# Patient Record
Sex: FEMALE | Race: WHITE | Employment: PART TIME
[De-identification: names, ages, dates, MRNs, and addresses within clinical notes are randomized per-mention and may not be internally consistent; named-entity substitution may affect disease eponyms.]

---

## 2022-03-18 PROBLEM — E78.5 DYSLIPIDEMIA: Status: ACTIVE | Noted: 2021-07-01

## 2022-03-18 PROBLEM — R63.5 WEIGHT GAIN: Status: ACTIVE | Noted: 2021-07-01

## 2022-03-18 PROBLEM — R79.89 ELEVATED LIVER FUNCTION TESTS: Status: ACTIVE | Noted: 2021-07-01

## 2022-03-19 PROBLEM — N28.9 RENAL INSUFFICIENCY: Status: ACTIVE | Noted: 2021-07-01

## 2022-07-27 ENCOUNTER — OFFICE VISIT (OUTPATIENT)
Dept: FAMILY MEDICINE CLINIC | Facility: CLINIC | Age: 67
End: 2022-07-27
Payer: MEDICARE

## 2022-07-27 VITALS
HEIGHT: 63 IN | BODY MASS INDEX: 31.54 KG/M2 | WEIGHT: 178 LBS | SYSTOLIC BLOOD PRESSURE: 158 MMHG | TEMPERATURE: 96.7 F | HEART RATE: 69 BPM | DIASTOLIC BLOOD PRESSURE: 98 MMHG | RESPIRATION RATE: 16 BRPM | OXYGEN SATURATION: 99 %

## 2022-07-27 DIAGNOSIS — Z23 NEED FOR SHINGLES VACCINE: ICD-10-CM

## 2022-07-27 DIAGNOSIS — Z12.11 SCREEN FOR COLON CANCER: ICD-10-CM

## 2022-07-27 DIAGNOSIS — E78.5 DYSLIPIDEMIA: ICD-10-CM

## 2022-07-27 DIAGNOSIS — Z23 NEED FOR PNEUMOCOCCAL VACCINATION: ICD-10-CM

## 2022-07-27 DIAGNOSIS — R63.4 WEIGHT LOSS: ICD-10-CM

## 2022-07-27 DIAGNOSIS — Z12.31 ENCOUNTER FOR SCREENING MAMMOGRAM FOR BREAST CANCER: ICD-10-CM

## 2022-07-27 DIAGNOSIS — Z78.0 ASYMPTOMATIC MENOPAUSE: ICD-10-CM

## 2022-07-27 DIAGNOSIS — Z00.00 MEDICARE ANNUAL WELLNESS VISIT, SUBSEQUENT: Primary | ICD-10-CM

## 2022-07-27 LAB
ALBUMIN SERPL-MCNC: 4.1 G/DL (ref 3.2–4.6)
ALBUMIN/GLOB SERPL: 1.4 {RATIO} (ref 1.2–3.5)
ALP SERPL-CCNC: 73 U/L (ref 50–136)
ALT SERPL-CCNC: 36 U/L (ref 12–65)
ANION GAP SERPL CALC-SCNC: 6 MMOL/L (ref 7–16)
AST SERPL-CCNC: 21 U/L (ref 15–37)
BASOPHILS # BLD: 0 K/UL (ref 0–0.2)
BASOPHILS NFR BLD: 1 % (ref 0–2)
BILIRUB SERPL-MCNC: 0.6 MG/DL (ref 0.2–1.1)
BUN SERPL-MCNC: 13 MG/DL (ref 8–23)
CALCIUM SERPL-MCNC: 9.3 MG/DL (ref 8.3–10.4)
CHLORIDE SERPL-SCNC: 109 MMOL/L (ref 98–107)
CHOLEST SERPL-MCNC: 256 MG/DL
CO2 SERPL-SCNC: 27 MMOL/L (ref 21–32)
CREAT SERPL-MCNC: 1 MG/DL (ref 0.6–1)
DIFFERENTIAL METHOD BLD: ABNORMAL
EOSINOPHIL # BLD: 0.1 K/UL (ref 0–0.8)
EOSINOPHIL NFR BLD: 1 % (ref 0.5–7.8)
ERYTHROCYTE [DISTWIDTH] IN BLOOD BY AUTOMATED COUNT: 13.3 % (ref 11.9–14.6)
GLOBULIN SER CALC-MCNC: 3 G/DL (ref 2.3–3.5)
GLUCOSE SERPL-MCNC: 87 MG/DL (ref 65–100)
HCT VFR BLD AUTO: 48 % (ref 35.8–46.3)
HDLC SERPL-MCNC: 42 MG/DL (ref 40–60)
HDLC SERPL: 6.1 {RATIO}
HGB BLD-MCNC: 15.3 G/DL (ref 11.7–15.4)
IMM GRANULOCYTES # BLD AUTO: 0 K/UL (ref 0–0.5)
IMM GRANULOCYTES NFR BLD AUTO: 0 % (ref 0–5)
LDLC SERPL CALC-MCNC: 181.8 MG/DL
LYMPHOCYTES # BLD: 2.3 K/UL (ref 0.5–4.6)
LYMPHOCYTES NFR BLD: 42 % (ref 13–44)
MCH RBC QN AUTO: 28.2 PG (ref 26.1–32.9)
MCHC RBC AUTO-ENTMCNC: 31.9 G/DL (ref 31.4–35)
MCV RBC AUTO: 88.6 FL (ref 79.6–97.8)
MONOCYTES # BLD: 0.5 K/UL (ref 0.1–1.3)
MONOCYTES NFR BLD: 9 % (ref 4–12)
NEUTS SEG # BLD: 2.6 K/UL (ref 1.7–8.2)
NEUTS SEG NFR BLD: 47 % (ref 43–78)
NRBC # BLD: 0 K/UL (ref 0–0.2)
PLATELET # BLD AUTO: 281 K/UL (ref 150–450)
PMV BLD AUTO: 9.8 FL (ref 9.4–12.3)
POTASSIUM SERPL-SCNC: 4.6 MMOL/L (ref 3.5–5.1)
PROT SERPL-MCNC: 7.1 G/DL (ref 6.3–8.2)
RBC # BLD AUTO: 5.42 M/UL (ref 4.05–5.2)
SODIUM SERPL-SCNC: 142 MMOL/L (ref 136–145)
TRIGL SERPL-MCNC: 161 MG/DL (ref 35–150)
TSH, 3RD GENERATION: 1.42 UIU/ML (ref 0.36–3.74)
VLDLC SERPL CALC-MCNC: 32.2 MG/DL (ref 6–23)
WBC # BLD AUTO: 5.6 K/UL (ref 4.3–11.1)

## 2022-07-27 PROCEDURE — 1123F ACP DISCUSS/DSCN MKR DOCD: CPT | Performed by: FAMILY MEDICINE

## 2022-07-27 PROCEDURE — 90732 PPSV23 VACC 2 YRS+ SUBQ/IM: CPT | Performed by: FAMILY MEDICINE

## 2022-07-27 PROCEDURE — G0439 PPPS, SUBSEQ VISIT: HCPCS | Performed by: FAMILY MEDICINE

## 2022-07-27 PROCEDURE — 3017F COLORECTAL CA SCREEN DOC REV: CPT | Performed by: FAMILY MEDICINE

## 2022-07-27 PROCEDURE — G0009 ADMIN PNEUMOCOCCAL VACCINE: HCPCS | Performed by: FAMILY MEDICINE

## 2022-07-27 RX ORDER — ZOSTER VACCINE RECOMBINANT, ADJUVANTED 50 MCG/0.5
0.5 KIT INTRAMUSCULAR SEE ADMIN INSTRUCTIONS
Qty: 0.5 ML | Refills: 0 | Status: SHIPPED | OUTPATIENT
Start: 2022-07-27 | End: 2023-01-23

## 2022-07-27 ASSESSMENT — PATIENT HEALTH QUESTIONNAIRE - PHQ9
SUM OF ALL RESPONSES TO PHQ QUESTIONS 1-9: 0
SUM OF ALL RESPONSES TO PHQ9 QUESTIONS 1 & 2: 0
SUM OF ALL RESPONSES TO PHQ QUESTIONS 1-9: 0
SUM OF ALL RESPONSES TO PHQ QUESTIONS 1-9: 0
1. LITTLE INTEREST OR PLEASURE IN DOING THINGS: 0
SUM OF ALL RESPONSES TO PHQ QUESTIONS 1-9: 0
2. FEELING DOWN, DEPRESSED OR HOPELESS: 0

## 2022-07-27 ASSESSMENT — LIFESTYLE VARIABLES
HOW OFTEN DO YOU HAVE A DRINK CONTAINING ALCOHOL: MONTHLY OR LESS
HOW MANY STANDARD DRINKS CONTAINING ALCOHOL DO YOU HAVE ON A TYPICAL DAY: 1 OR 2

## 2022-07-27 NOTE — PROGRESS NOTES
HISTORY OF PRESENT ILLNESS  Chief Complaint   Patient presents with    Medicare AW         Hyperlipidemia  Patient presents for follow up evaluation of hyperlipidemia. Diet and Lifestyle was discussed. Pertinent ROS: taking medications as instructed, no medication side effects noted, no TIA's, no chest pain on exertion, no dyspnea on exertion, no swelling of ankles. Risk factors for vascular disease consist of hyperlipidemia. Key CAD CHF Meds       Patient is on no cardiovascular meds. Lab Results   Component Value Date    CHOL 256 (H) 07/27/2022    CHOL 230 (H) 07/01/2021    CHOL 243 (H) 08/14/2019     Lab Results   Component Value Date    TRIG 161 (H) 07/27/2022    TRIG 95 07/01/2021    TRIG 125 08/14/2019     Lab Results   Component Value Date    HDL 42 07/27/2022    HDL 48 07/01/2021    HDL 50 08/14/2019     Lab Results   Component Value Date    LDLCALC 181.8 (H) 07/27/2022    LDLCALC 165 (H) 07/01/2021    LDLCALC 168 (H) 08/14/2019     Lab Results   Component Value Date    LABVLDL 32.2 (H) 07/27/2022    LABVLDL 25 08/14/2019    VLDL 17 07/01/2021     Lab Results   Component Value Date    CHOLHDLRATIO 6.1 07/27/2022      Worse with high fat or high sugar diet. Improves with exercise and healthy diet . BP Readings from Last 3 Encounters:   07/27/22 (!) 158/98   07/01/21 137/87      Wt Readings from Last 3 Encounters:   07/27/22 178 lb (80.7 kg)   07/01/21 182 lb (82.6 kg)      Working 3 days a week. Walks the dog in the mornings. HPI  Cholesterol Problem  The history is provided by the patient. This is a chronic problem. The current episode started more than 1 week ago. The problem occurs daily. The problem has not changed since onset. Pertinent negatives include no chest pain, no abdominal pain, no headaches and no shortness of breath. The symptoms are aggravated by eating. The symptoms are relieved by walking. The treatment provided no relief.     Review of Systems   Constitutional: Negative for activity change, appetite change, chills, fatigue and fever. HENT:  Negative for congestion and rhinorrhea. Respiratory:  Negative for cough, shortness of breath and wheezing. Gastrointestinal:  Negative for abdominal pain, constipation, diarrhea, nausea and vomiting. Genitourinary:  Negative for dysuria. Musculoskeletal:  Negative for arthralgias and myalgias. Neurological:  Negative for dizziness and headaches. Psychiatric/Behavioral:  Negative for dysphoric mood. The patient is not nervous/anxious. Patient Active Problem List   Diagnosis    Elevated liver function tests    Dyslipidemia    Weight gain    Renal insufficiency         Blood pressure (!) 158/98, pulse 69, temperature (!) 96.7 °F (35.9 °C), temperature source Temporal, resp. rate 16, height 5' 2.75\" (1.594 m), weight 178 lb (80.7 kg), SpO2 99 %. Pain Scale: 0 - No pain/10 Pain Location:   Body mass index is 31.78 kg/m². Physical Exam  Vitals and nursing note reviewed. Constitutional:       General: She is not in acute distress. Appearance: Normal appearance. She is normal weight. She is not toxic-appearing. HENT:      Head: Normocephalic and atraumatic. Eyes:      General: Lids are normal.      Extraocular Movements: Extraocular movements intact. Conjunctiva/sclera: Conjunctivae normal.      Pupils: Pupils are equal.   Cardiovascular:      Rate and Rhythm: Normal rate and regular rhythm. Heart sounds: Normal heart sounds. No murmur heard. No friction rub. No gallop. Pulmonary:      Effort: Pulmonary effort is normal. No respiratory distress. Breath sounds: Normal breath sounds. No wheezing or rales. Musculoskeletal:      Right lower leg: No edema. Left lower leg: No edema. Skin:     General: Skin is warm and dry. Neurological:      General: No focal deficit present. Mental Status: She is alert.    Psychiatric:         Mood and Affect: Mood normal.         Behavior: Behavior normal.         Thought Content: Thought content normal.         Judgment: Judgment normal.            ASSESSMENT and PLAN   Diagnosis Orders   1. Medicare annual wellness visit, subsequent        2. Encounter for screening mammogram for breast cancer  ZIA ROSSY DIGITAL SCREEN BILATERAL      3. Need for shingles vaccine  zoster recombinant adjuvanted vaccine [facility]) 50 MCG/0.5ML SUSR injection      4. Dyslipidemia  Lipid Panel    Lipid Panel      5. Need for pneumococcal vaccination  Pneumococcal, PPSV23, PNEUMOVAX 21, (age 2 yrs+), SC/IM      6. Asymptomatic menopause  DEXA BONE DENSITY AXIAL SKELETON      7. Screen for colon cancer  Cologuard (Fecal DNA Colorectal Cancer Screening)      8. Weight loss  TSH    Comprehensive Metabolic Panel    CBC with Auto Differential    CBC with Auto Differential    Comprehensive Metabolic Panel    TSH          Reviewed medications and side effects in detail          Her other chronic conditions are stable at this time. She is stable on the current treatment and tolerating it well. No significant sides effects. Will not adjust therapy at this time, unless noted above. We will continue to monitor for any problems. Medications refilled and lab work has been ordered where needed. Reviewed medications are explained including any potential interactions or side effects in detail. The patient's questions were answered. She  understands the above and has no further questions. Further workup and treatment should be done if symptoms persist, worsen or new symptoms occur. She  will call to notify us of any problems, complications or worsening symptoms. Follow-up and Dispositions    Return in about 1 year (around 7/27/2023) for labs today. There are no Patient Instructions on file for this visit. (Some details in this note may have been created with speech-recognition software. Some errors in speech recognition may have occurred.    Most of those have been corrected but some may have been missed.)         Miguel Valdez MD  [facility]  [address] [place] [address]  Phone [phone]      Medicare Annual Wellness Visit    Miguel Barney is here for Medicare AWV    Assessment & Plan   Medicare annual wellness visit, subsequent  Encounter for screening mammogram for breast cancer  -     ZIA ROSSY DIGITAL SCREEN BILATERAL; Future  Need for shingles vaccine  -     zoster recombinant adjuvanted vaccine [facility]) 50 MCG/0.5ML SUSR injection; Inject 0.5 mLs into the muscle See Admin Instructions 1 dose now and repeat in 2-6 months, Disp-0.5 mL, R-0Normal  Dyslipidemia  -     Lipid Panel; Future  Need for pneumococcal vaccination  -     Pneumococcal, PPSV23, PNEUMOVAX 21, (age 2 yrs+), SC/IM  Asymptomatic menopause  -     DEXA BONE DENSITY AXIAL SKELETON; Future  Screen for colon cancer  -     Cologuard (Fecal DNA Colorectal Cancer Screening)  Weight loss  -     TSH; Future  -     Comprehensive Metabolic Panel; Future  -     CBC with Auto Differential; Future    Recommendations for Preventive Services Due: see orders and patient instructions/AVS.  Recommended screening schedule for the next 5-10 years is provided to the patient in written form: see Patient Instructions/AVS.     Return for Medicare Annual Wellness Visit in 1 year. Subjective       Patient's complete Health Risk Assessment and screening values have been reviewed and are found in Flowsheets. The following problems were reviewed today and where indicated follow up appointments were made and/or referrals ordered. Positive Risk Factor Screenings with Interventions:             Opioid Risk: (Low risk score <55) Opioid risk score: 2    Patient is low risk for opioid use disorder or overdose.   Last PDMP Nano Sim as Reviewed:  Review User Review Instant Review Result             General Health and ACP:  General  In general, how would you say your health is?: Very Good  In the past 7 days, have you experienced any of the following: New or Increased Pain, New or Increased Fatigue, Loneliness, Social Isolation, Stress or Anger?: (!) Yes  Select all that apply: (!) Stress  Do you get the social and emotional support that you need?: Yes  Do you have a Living Will?: (!) No    Advance Directives       Power of  Living Will ACP-Advance Directive ACP-Power of     Not on File Not on File Not on File Not on File          General Health Risk Interventions:  No additional needs    Health Habits/Nutrition:  Physical Activity: Insufficiently Active    Days of Exercise per Week: 3 days    Minutes of Exercise per Session: 30 min     Have you lost any weight without trying in the past 3 months?: No  Body mass index: (!) 31.78  Have you seen the dentist within the past year?: Yes  Health Habits/Nutrition Interventions:  No additional needs    Hearing/Vision:  Do you or your family notice any trouble with your hearing that hasn't been managed with hearing aids?: No  Do you have difficulty driving, watching TV, or doing any of your daily activities because of your eyesight?: No  Have you had an eye exam within the past year?: (!) No  No results found.   Hearing/Vision Interventions:  No additional needs    Safety:  Do you have working smoke detectors?: Yes  Do you have any tripping hazards - loose or unsecured carpets or rugs?: No  Do you have any tripping hazards - clutter in doorways, halls, or stairs?: No  Do you have either shower bars, grab bars, non-slip mats or non-slip surfaces in your shower or bathtub?: (!) No  Do all of your stairways have a railing or banister?: Yes  Do you always fasten your seatbelt when you are in a car?: Yes  Safety Interventions:  Patient declines any further evaluation/treatment for this issue           Objective   Vitals:    07/27/22 1148   BP: (!) 158/98   Site: Left Upper Arm   Position: Sitting   Cuff Size: Large Adult   Pulse: 69   Resp: 16   Temp: Gasper Contrerassvetlana ) 96.7 °F (35.9 °C)   TempSrc: Temporal   SpO2: 99%   Weight: 178 lb (80.7 kg)   Height: 5' 2.75\" (1.594 m)      Body mass index is 31.78 kg/m². No Known Allergies  Prior to Visit Medications    Medication Sig Taking? Authorizing Provider   zoster recombinant adjuvanted vaccine [facility]) 50 MCG/0.5ML SUSR injection Inject 0.5 mLs into the muscle See Admin Instructions 1 dose now and repeat in 2-6 months Yes Keyur Guerin MD   Opioid Use Statement for Medicare  Her medication list was reviewed and reconciliation performed. She does not use opioid medication inappropriately.      CareTeam (Including outside providers/suppliers regularly involved in providing care):   Patient Care Team:  Keyur Guerin MD as PCP - Thais Moreno MD as PCP - [facility] Empaneled Provider     Reviewed and updated this visit:  Tobacco  Allergies  Meds  Problems  Med Hx  Surg Hx  Soc Hx  Fam Hx

## 2022-07-28 NOTE — RESULT ENCOUNTER NOTE
Your cholesterol levels are elevated. Make sure you are eating a heart healthy nutritious diet, getting regular aerobic physical activity, maintaining desired appropriate body weight and avoiding tobacco products. Recheck lab work with an appointment in 3-6 months. You would benefit from a cholesterol medication. That would help prevent heart attack and stroke. I can send that to your pharmacy if interested.   Other labs are normal.

## 2022-08-02 ASSESSMENT — ENCOUNTER SYMPTOMS
CONSTIPATION: 0
RHINORRHEA: 0
SHORTNESS OF BREATH: 0
WHEEZING: 0
DIARRHEA: 0
ABDOMINAL PAIN: 0
NAUSEA: 0
VOMITING: 0
COUGH: 0

## 2022-08-03 NOTE — PATIENT INSTRUCTIONS
Personalized Preventive Plan for Tez Domínguez - 7/27/2022  Medicare offers a range of preventive health benefits. Some of the tests and screenings are paid in full while other may be subject to a deductible, co-insurance, and/or copay. Some of these benefits include a comprehensive review of your medical history including lifestyle, illnesses that may run in your family, and various assessments and screenings as appropriate. After reviewing your medical record and screening and assessments performed today your provider may have ordered immunizations, labs, imaging, and/or referrals for you. A list of these orders (if applicable) as well as your Preventive Care list are included within your After Visit Summary for your review. Other Preventive Recommendations:    A preventive eye exam performed by an eye specialist is recommended every 1-2 years to screen for glaucoma; cataracts, macular degeneration, and other eye disorders. A preventive dental visit is recommended every 6 months. Try to get at least 150 minutes of exercise per week or 10,000 steps per day on a pedometer . Order or download the FREE \"Exercise & Physical Activity: Your Everyday Guide\" from [employer]. Call 8-536.585.5069 or search [employer] online. You need 7869-2693 mg of calcium and 6825-5425 IU of vitamin D per day. It is possible to meet your calcium requirement with diet alone, but a vitamin D supplement is usually necessary to meet this goal.  When exposed to the sun, use a sunscreen that protects against both UVA and UVB radiation with an SPF of 30 or greater. Reapply every 2 to 3 hours or after sweating, drying off with a towel, or swimming. Always wear a seat belt when traveling in a car. Always wear a helmet when riding a bicycle or motorcycle.

## 2022-08-11 ENCOUNTER — TELEPHONE (OUTPATIENT)
Dept: FAMILY MEDICINE CLINIC | Facility: CLINIC | Age: 67
End: 2022-08-11

## 2022-08-11 ENCOUNTER — HOSPITAL ENCOUNTER (OUTPATIENT)
Dept: MAMMOGRAPHY | Age: 67
Discharge: HOME | End: 2022-08-14
Payer: MEDICARE

## 2022-08-11 DIAGNOSIS — Z78.0 ASYMPTOMATIC MENOPAUSE: ICD-10-CM

## 2022-08-11 DIAGNOSIS — E78.5 DYSLIPIDEMIA: Primary | ICD-10-CM

## 2022-08-11 PROCEDURE — 77080 DXA BONE DENSITY AXIAL: CPT

## 2022-08-11 RX ORDER — ROSUVASTATIN CALCIUM 20 MG/1
20 TABLET, COATED ORAL NIGHTLY
Qty: 90 TABLET | Refills: 3 | Status: SHIPPED | OUTPATIENT
Start: 2022-08-11

## 2022-08-11 NOTE — TELEPHONE ENCOUNTER
----- Message from HARMONY Heredia 98. KACI Ward sent at 8/11/2022 12:33 PM EDT -----  Notified pt. States that she will try the medication.  Please send to [facility]

## 2022-08-11 NOTE — TELEPHONE ENCOUNTER
Prescription(s) refilled  It (they) has been escribed to the pharmacy. Requested Prescriptions     Signed Prescriptions Disp Refills    rosuvastatin (CRESTOR) 20 MG tablet 90 tablet 3     Sig: Take 1 tablet by mouth nightly     Authorizing Provider: VEL ACOSTA     No orders of the defined types were placed in this encounter. ICD-10-CM    1.  Dyslipidemia  E78.5 rosuvastatin (CRESTOR) 20 MG tablet

## 2022-08-14 NOTE — RESULT ENCOUNTER NOTE
Your bone density test was normal .  Make sure you are getting plenty of weight bearing exercise. Make sure you are getting enough calcium IN YOUR DIET . You may want to consider adding vit D3 2000 iu each day WITH A MEAL THAT CONTAINS FAT. Vitamin D is a fat soluble vitamin.

## 2022-09-09 ENCOUNTER — HOSPITAL ENCOUNTER (OUTPATIENT)
Dept: MAMMOGRAPHY | Age: 67
Discharge: HOME | End: 2022-09-12
Payer: MEDICARE

## 2022-09-09 DIAGNOSIS — Z12.31 ENCOUNTER FOR SCREENING MAMMOGRAM FOR BREAST CANCER: ICD-10-CM

## 2022-09-09 PROCEDURE — 77063 BREAST TOMOSYNTHESIS BI: CPT

## 2022-09-27 ENCOUNTER — APPOINTMENT (OUTPATIENT)
Dept: MAMMOGRAPHY | Age: 67
End: 2022-09-27
Payer: MEDICARE

## 2022-09-27 ENCOUNTER — HOSPITAL ENCOUNTER (OUTPATIENT)
Dept: MAMMOGRAPHY | Age: 67
Discharge: HOME | End: 2022-09-30
Payer: MEDICARE

## 2022-09-27 DIAGNOSIS — R92.8 ABNORMAL SCREENING MAMMOGRAM: ICD-10-CM

## 2022-09-27 PROCEDURE — 77065 DX MAMMO INCL CAD UNI: CPT

## 2022-09-27 PROCEDURE — 76642 ULTRASOUND BREAST LIMITED: CPT

## 2022-10-05 ENCOUNTER — HOSPITAL ENCOUNTER (OUTPATIENT)
Dept: MAMMOGRAPHY | Age: 67
Discharge: HOME | End: 2022-10-08
Payer: MEDICARE

## 2022-10-05 ENCOUNTER — APPOINTMENT (OUTPATIENT)
Dept: MAMMOGRAPHY | Age: 67
End: 2022-10-05
Payer: MEDICARE

## 2022-10-05 VITALS — SYSTOLIC BLOOD PRESSURE: 167 MMHG | HEART RATE: 91 BPM | DIASTOLIC BLOOD PRESSURE: 91 MMHG

## 2022-10-05 DIAGNOSIS — R92.8 ABNORMAL MAMMOGRAM OF RIGHT BREAST: ICD-10-CM

## 2022-10-05 DIAGNOSIS — N63.10 MASS OF RIGHT BREAST, UNSPECIFIED QUADRANT: ICD-10-CM

## 2022-10-05 PROCEDURE — 77065 DX MAMMO INCL CAD UNI: CPT

## 2022-10-05 PROCEDURE — 88361 TUMOR IMMUNOHISTOCHEM/COMPUT: CPT

## 2022-10-05 PROCEDURE — 88305 TISSUE EXAM BY PATHOLOGIST: CPT

## 2022-10-05 PROCEDURE — 2709999900 US BREAST BIOPSY W LOC DEVICE 1ST LESION RIGHT

## 2022-10-05 PROCEDURE — 2500000003 HC RX 250 WO HCPCS: Performed by: FAMILY MEDICINE

## 2022-10-05 RX ORDER — LIDOCAINE HYDROCHLORIDE 10 MG/ML
5 INJECTION, SOLUTION INFILTRATION; PERINEURAL ONCE
Status: COMPLETED | OUTPATIENT
Start: 2022-10-05 | End: 2022-10-05

## 2022-10-05 RX ADMIN — LIDOCAINE HYDROCHLORIDE 5 ML: 10 INJECTION, SOLUTION INFILTRATION; PERINEURAL at 09:13

## 2022-10-05 ASSESSMENT — PAIN DESCRIPTION - INTENSITY
RATING_2: 0
RATING_3: 1

## 2022-10-05 ASSESSMENT — PAIN SCALES - GENERAL: PAINLEVEL_OUTOF10: 0

## 2022-10-10 ENCOUNTER — CLINICAL DOCUMENTATION (OUTPATIENT)
Dept: MAMMOGRAPHY | Age: 67
End: 2022-10-10

## 2022-10-10 NOTE — PROGRESS NOTES
Dr Reagan Montesinos and I spoke with patient regarding the results of her breast biopsy, she was here alone. She had no post biopsy issues or concerns. She is scheduled for an MRI 10-19-22 @ 3:30, she was unable to come any sooner due to work schedule. I gave her an information packet and let her know she will be contacted by the cancer center with her next steps.

## 2022-10-11 ENCOUNTER — TELEPHONE (OUTPATIENT)
Dept: CASE MANAGEMENT | Age: 67
End: 2022-10-11

## 2022-10-11 SDOH — SOCIAL STABILITY: SOCIAL INSECURITY
WITHIN THE LAST YEAR, HAVE TO BEEN RAPED OR FORCED TO HAVE ANY KIND OF SEXUAL ACTIVITY BY YOUR PARTNER OR EX-PARTNER?: NO

## 2022-10-11 SDOH — HEALTH STABILITY: MENTAL HEALTH
STRESS IS WHEN SOMEONE FEELS TENSE, NERVOUS, ANXIOUS, OR CAN'T SLEEP AT NIGHT BECAUSE THEIR MIND IS TROUBLED. HOW STRESSED ARE YOU?: NOT AT ALL

## 2022-10-11 SDOH — ECONOMIC STABILITY: TRANSPORTATION INSECURITY
IN THE PAST 12 MONTHS, HAS THE LACK OF TRANSPORTATION KEPT YOU FROM MEDICAL APPOINTMENTS OR FROM GETTING MEDICATIONS?: NO

## 2022-10-11 SDOH — ECONOMIC STABILITY: FOOD INSECURITY: WITHIN THE PAST 12 MONTHS, THE FOOD YOU BOUGHT JUST DIDN'T LAST AND YOU DIDN'T HAVE MONEY TO GET MORE.: NEVER TRUE

## 2022-10-11 SDOH — SOCIAL STABILITY: SOCIAL NETWORK: HOW OFTEN DO YOU GET TOGETHER WITH FRIENDS OR RELATIVES?: MORE THAN THREE TIMES A WEEK

## 2022-10-11 SDOH — HEALTH STABILITY: PHYSICAL HEALTH: ON AVERAGE, HOW MANY MINUTES DO YOU ENGAGE IN EXERCISE AT THIS LEVEL?: 0 MIN

## 2022-10-11 SDOH — SOCIAL STABILITY: SOCIAL NETWORK
IN A TYPICAL WEEK, HOW MANY TIMES DO YOU TALK ON THE PHONE WITH FAMILY, FRIENDS, OR NEIGHBORS?: MORE THAN THREE TIMES A WEEK

## 2022-10-11 SDOH — ECONOMIC STABILITY: INCOME INSECURITY: HOW HARD IS IT FOR YOU TO PAY FOR THE VERY BASICS LIKE FOOD, HOUSING, MEDICAL CARE, AND HEATING?: NOT VERY HARD

## 2022-10-11 SDOH — HEALTH STABILITY: PHYSICAL HEALTH: ON AVERAGE, HOW MANY DAYS PER WEEK DO YOU ENGAGE IN MODERATE TO STRENUOUS EXERCISE (LIKE A BRISK WALK)?: 0 DAYS

## 2022-10-11 SDOH — SOCIAL STABILITY: SOCIAL INSECURITY: WITHIN THE LAST YEAR, HAVE YOU BEEN HUMILIATED OR EMOTIONALLY ABUSED IN OTHER WAYS BY YOUR PARTNER OR EX-PARTNER?: NO

## 2022-10-11 SDOH — ECONOMIC STABILITY: INCOME INSECURITY: IN THE LAST 12 MONTHS, WAS THERE A TIME WHEN YOU WERE NOT ABLE TO PAY THE MORTGAGE OR RENT ON TIME?: NO

## 2022-10-11 SDOH — SOCIAL STABILITY: SOCIAL INSECURITY
WITHIN THE LAST YEAR, HAVE YOU BEEN KICKED, HIT, SLAPPED, OR OTHERWISE PHYSICALLY HURT BY YOUR PARTNER OR EX-PARTNER?: NO

## 2022-10-11 SDOH — ECONOMIC STABILITY: TRANSPORTATION INSECURITY
IN THE PAST 12 MONTHS, HAS LACK OF TRANSPORTATION KEPT YOU FROM MEETINGS, WORK, OR FROM GETTING THINGS NEEDED FOR DAILY LIVING?: NO

## 2022-10-11 SDOH — SOCIAL STABILITY: SOCIAL INSECURITY: WITHIN THE LAST YEAR, HAVE YOU BEEN AFRAID OF YOUR PARTNER OR EX-PARTNER?: NO

## 2022-10-11 SDOH — ECONOMIC STABILITY: FOOD INSECURITY: WITHIN THE PAST 12 MONTHS, YOU WORRIED THAT YOUR FOOD WOULD RUN OUT BEFORE YOU GOT MONEY TO BUY MORE.: NEVER TRUE

## 2022-10-11 SDOH — ECONOMIC STABILITY: HOUSING INSECURITY
IN THE LAST 12 MONTHS, WAS THERE A TIME WHEN YOU DID NOT HAVE A STEADY PLACE TO SLEEP OR SLEPT IN A SHELTER (INCLUDING NOW)?: NO

## 2022-10-11 SDOH — SOCIAL STABILITY: SOCIAL NETWORK: ARE YOU MARRIED, WIDOWED, DIVORCED, SEPARATED, NEVER MARRIED, OR LIVING WITH A PARTNER?: DIVORCED

## 2022-10-11 ASSESSMENT — PATIENT HEALTH QUESTIONNAIRE - PHQ9
SUM OF ALL RESPONSES TO PHQ9 QUESTIONS 1 & 2: 0
2. FEELING DOWN, DEPRESSED OR HOPELESS: 0
SUM OF ALL RESPONSES TO PHQ QUESTIONS 1-9: 0
SUM OF ALL RESPONSES TO PHQ QUESTIONS 1-9: 0
1. LITTLE INTEREST OR PLEASURE IN DOING THINGS: 0
SUM OF ALL RESPONSES TO PHQ QUESTIONS 1-9: 0
SUM OF ALL RESPONSES TO PHQ QUESTIONS 1-9: 0

## 2022-10-11 NOTE — TELEPHONE ENCOUNTER
10/11/2022  Breast Navigation  intake complete for New Patient Breast Cancer. Reviewed role of navigation, gave contact information for navigators, discussed pathology report and  pending receptor status, reviewed upcoming appointments. Patient is employed as a part time  teacher. Independent in self care No physical limitations. Barriers:  No  financial, psychosocial,or transportation barriers noted. Lives with her ex  they have reunited. Her daughter Austin Eugene is also local and along with her ex  , both are her primary support system. She prefers communication to her only for the time being for her alecia care discussions. Family history of breast cancer:  No  She had an axillary node biopsy in 1973 this was noted to be cat scratch fever. Her sister had lung cancer. Type of cancer: Right breast IDC. MRI   10-19-22 due to work schedule. Social determinants of health and Depression screen complete. Referring Provider:  Dr. Lobito Blue MD and Navigator to treatment team   Appointment with Oncology: Dr. Jyoti Scott 10-21. Appointment with Surgery: Dr. Chi Kenny 10-24-22. Breast Multidisciplinary Conference presentation date:  pending for 10-27-22. My Chart link send to patient. Routed note to referring provider regarding intake and upcoming appointments.

## 2022-10-19 ENCOUNTER — HOSPITAL ENCOUNTER (OUTPATIENT)
Dept: MRI IMAGING | Age: 67
Discharge: HOME | End: 2022-10-22
Payer: MEDICARE

## 2022-10-19 DIAGNOSIS — C50.919 INFILTRATING DUCT CARCINOMA (HCC): ICD-10-CM

## 2022-10-19 PROCEDURE — C8908 MRI W/O FOL W/CONT, BREAST,: HCPCS

## 2022-10-19 PROCEDURE — A9579 GAD-BASE MR CONTRAST NOS,1ML: HCPCS | Performed by: FAMILY MEDICINE

## 2022-10-19 PROCEDURE — 6360000004 HC RX CONTRAST MEDICATION: Performed by: FAMILY MEDICINE

## 2022-10-19 RX ADMIN — GADOTERIDOL 18 ML: 279.3 INJECTION, SOLUTION INTRAVENOUS at 16:28

## 2022-10-20 NOTE — PROGRESS NOTES
New Patient Abstract    Reason for Referral: Breast Cancer    Referring Provider:  Mayra Rizo MD    Primary Care Provider: Mayra Rizo MD    Family History of Cancer/Hematologic Disorders: Family history is significant for sister with lung cancer. Presenting Symptoms: Abnormal routine screening mammogram     Narrative with recent with Results/Procedures/Biopsies and Dates completed: Ms. Gil Dougherty is a 59-year-old white female with a history of dyslipidemia, elevated LFTs, renal insufficiency, and axillary node biopsy in 1973 that was noted to be cat scratch fever. She initially presented for a routine bilateral screening mammogram on 9/9/22 which identified a right posterocentral outer mid breast mass and associated microcalcifications. Further evaluation with right breast diagnostic mammogram on 9/27/2022 showed mass-like density and associated calcifications persisted in the outer right breast demonstrating suspicious features with spiculated margins and suggested architectural distortion. Right breast ultrasound also performed on 9/27/22 confirmed a hypoechoic shadowing mass at the 9 o'clock position of the right breast measuring 2.7 cm x 3 cm x 1.9 cm demonstrating multiple highly suspicious features such as irregular margins, shadowing, and a vertical configuration for which a benign process could not be confirmed. Recommended ultrasound-guided biopsy of the right breast lesion was performed on 10/5/22 with pathology revealing ER 98%/OH 92% positive, HER-2 (0) negative, high grade (poorly differentiated), infiltrating ductal carcinoma with no definite in situ component or lymphovascular invasion identified. MRI of the bilateral breasts as completed on 10/19/22 demonstrating right 9:00 breast cancer measuring up to 6.4 cm by MRI (recent mammography demonstrated at least 8 cm of expected disease); no suspicious left breast finding; and no obvious lymphadenopathy.     Patient is now referred to [facility] for Medical Oncology evaluation and treatment of newly diagnosed breast cancer. She was also referred to surgery and is scheduled for initial surgical consultation with Surgeon, Dr. Dameon Hill, on 10/24/22. BILATERAL SCREENING DIGITAL MAMMOGRAPHY WITH TOMOSYNTHESIS 9/9/22  FINDINGS:  CC and MLO views of both breasts demonstrate scattered fibroglandular tissue bilaterally. Scattered typically benign bilateral calcifications and small axillary tail lymph nodes are again noted. There is a new irregular mass posterocentrally in the right outer mid breast measuring approximately 3 cm at the 9-10:00 position 11 cm from the nipple. Associated microcalcifications extend anteriorly and posteriorly over total of approximately a centimeter. No other definite evidence for malignancy is seen elsewhere in either breast.   IMPRESSION: RIGHT POSTEROCENTRAL OUTER MID BREAST MASS AND ASSOCIATED MICROCALCIFICATIONS SHOULD BE FURTHER EVALUATED WITH STRAIGHT LATERAL AND MAGNIFICATION SPOT COMPRESSION VIEWS AS WELL AS ULTRASOUND AT THIS TIME. BI-RADS Assessment Category 0: Incomplete, needs additional imaging evaluation. BD2    RIGHT BREAST DIAGNOSTIC MAMMOGRAM and RIGHT BREAST ULTRASOUND, 9/27/2022  FINDINGS:  RIGHT BREAST DIAGNOSTIC MAMMOGRAM: Straight mediolateral view of the right breast as well as compression images of the right breast in the CC, and MLO plane are submitted for evaluation. Masslike  density and associated calcifications persist in the outer right breast. These demonstrate suspicious features with spiculated margins and suggest architectural distortion. Additional associated calcifications also suspicious. Further characterization with focused diagnostic ultrasound is recommended.    RIGHT BREAST ULTRASOUND:   FINDINGS: Focused ultrasound imaging at the 9 o'clock position of the right breast in the area of masslike density as seen on prior mammogram imaging shows a hypoechoic shadowing mass measuring 2.7 cm x 3 cm x 1.9 cm demonstrating multiple highly suspicious features such as irregular margins, shadowing, and a vertical configuration for which a benign process cannot be confirmed. IMPRESSION:  1. Highly suspicious right breast mass. Further evaluation with ultrasound-guided biopsy is recommended. BI-RADS Assessment Category 5: Highly suggestive of malignancy- Appropriate action should be taken. [facility] SURGICAL PATHOLOGY REPORT 10/5/2022  DIAGNOSIS   A: \" RIGHT BREAST, 9:00 POSITION, 12 CM FROM NIPPLE, CORE BIOPSY\": INFILTRATING DUCTAL CARCINOMA, HIGH GRADE (POORLY DIFFERENTIATED). DEFINITE IN SITU COMPONENT AND LYMPHOVASCULAR INVASION ARE NOT IDENTIFIED  [facility]- ER/NM/[id number] BY IHC   INTERPRETATION   [employer] [facility] IHC Quantitative Breast Panel Result: A1   TEST NAME:     RESULTS:   INTERNAL CONTROLS:   ESTROGEN RECEPTOR:   Positive (98%)  Present, positive   PROGESTERONE RECEPTOR:  Positive (92%)  Present, positive   HER-2/SHARDA:     Negative (0)   Percentage of Cells with Uniform   Intense Complete Membrane   Stainin%    MRI BREAST BILATERAL W WO CONTRAST 10/19/22  FINDINGS: The breasts demonstrate moderate glandularity and mild background enhancement. RIGHT BREAST: An 9:00 midposterior depth are biopsy changes within the irregular heterogeneously enhancing malignant mass measuring up to 4.0 x 2.6 x 3.3 cm. Anterior to the mass (within 1.5 cm of the anterior margin) system are 2 tiny 3 mm satellite nodules. Overall the MRI area of concern measures up to 6.4 cm AP. Note that on recent mammography, malignant-type calcifications extending anteriorly and posteriorly from the mass for total AP extent of at least 8 cm. LEFT BREAST: No evidence of suspicious enhancing mass, and no dominant or unique nonmass enhancement, to suggest malignancy. LYMPH NODES: No obvious axillary or internal mammary lymphadenopathy.  Several bilateral axillary lymph nodes are relatively symmetric in size and number, all demonstrating preservation of expected reniform shape with fatty grant. Elsewhere colon limited inclusion of the thorax and upper abdomen shows a partially visualized hiatal hernia which is not entirely seen or evaluated here. IMPRESSION:   1. Right 9:00 breast cancer measures up to 6.4 cm by MRI (recent mammography demonstrated at least 8 cm of expected disease). 2. No suspicious left breast finding. 3. No obvious lymphadenopathy. Recommend continued malignancy management as directed clinically. BI-RADS Assessment Category 6: Known Biopsy Proven Malignancy    Notes from Referring Provider: None    Other Pertinent Information:         Presented at Tumor Board: Patient is scheduled to be presented at tumor board on 10/27/22.

## 2022-10-21 ENCOUNTER — OFFICE VISIT (OUTPATIENT)
Dept: ONCOLOGY | Age: 67
End: 2022-10-21
Payer: MEDICARE

## 2022-10-21 VITALS
OXYGEN SATURATION: 96 % | TEMPERATURE: 97.9 F | SYSTOLIC BLOOD PRESSURE: 153 MMHG | RESPIRATION RATE: 17 BRPM | HEART RATE: 85 BPM | WEIGHT: 179 LBS | HEIGHT: 63 IN | DIASTOLIC BLOOD PRESSURE: 97 MMHG | BODY MASS INDEX: 31.71 KG/M2

## 2022-10-21 DIAGNOSIS — Z17.0 MALIGNANT NEOPLASM OF OVERLAPPING SITES OF RIGHT BREAST IN FEMALE, ESTROGEN RECEPTOR POSITIVE (HCC): Primary | ICD-10-CM

## 2022-10-21 DIAGNOSIS — C50.811 MALIGNANT NEOPLASM OF OVERLAPPING SITES OF RIGHT BREAST IN FEMALE, ESTROGEN RECEPTOR POSITIVE (HCC): Primary | ICD-10-CM

## 2022-10-21 PROCEDURE — 1123F ACP DISCUSS/DSCN MKR DOCD: CPT | Performed by: INTERNAL MEDICINE

## 2022-10-21 PROCEDURE — G8417 CALC BMI ABV UP PARAM F/U: HCPCS | Performed by: INTERNAL MEDICINE

## 2022-10-21 PROCEDURE — 99205 OFFICE O/P NEW HI 60 MIN: CPT | Performed by: INTERNAL MEDICINE

## 2022-10-21 PROCEDURE — 3017F COLORECTAL CA SCREEN DOC REV: CPT | Performed by: INTERNAL MEDICINE

## 2022-10-21 PROCEDURE — G8399 PT W/DXA RESULTS DOCUMENT: HCPCS | Performed by: INTERNAL MEDICINE

## 2022-10-21 PROCEDURE — 1036F TOBACCO NON-USER: CPT | Performed by: INTERNAL MEDICINE

## 2022-10-21 PROCEDURE — G8428 CUR MEDS NOT DOCUMENT: HCPCS | Performed by: INTERNAL MEDICINE

## 2022-10-21 PROCEDURE — G8484 FLU IMMUNIZE NO ADMIN: HCPCS | Performed by: INTERNAL MEDICINE

## 2022-10-21 PROCEDURE — 1090F PRES/ABSN URINE INCON ASSESS: CPT | Performed by: INTERNAL MEDICINE

## 2022-10-21 RX ORDER — AMOXICILLIN 500 MG
CAPSULE ORAL DAILY
COMMUNITY

## 2022-10-21 ASSESSMENT — PATIENT HEALTH QUESTIONNAIRE - PHQ9
1. LITTLE INTEREST OR PLEASURE IN DOING THINGS: 0
SUM OF ALL RESPONSES TO PHQ QUESTIONS 1-9: 0
2. FEELING DOWN, DEPRESSED OR HOPELESS: 0
SUM OF ALL RESPONSES TO PHQ9 QUESTIONS 1 & 2: 0
SUM OF ALL RESPONSES TO PHQ QUESTIONS 1-9: 0

## 2022-10-21 NOTE — PROGRESS NOTES
[facility] Hematology and Oncology: Office Visit New Patient H & P    Chief Complaint:    Chief Complaint   Patient presents with    Follow-up         History of Present Illness:  Ms. Delon Arce is a 75 y.o. female who presents today for evaluation regarding breast cancer. She initially presented for a routine bilateral screening mammogram on 9/9/22 which identified a right posterocentral outer mid breast mass and associated microcalcifications. Further evaluation with right breast diagnostic mammogram on 9/27/2022 showed mass-like density and associated calcifications persisted in the outer right breast demonstrating suspicious features with spiculated margins and suggested architectural distortion. Right breast ultrasound also performed on 9/27/22 confirmed a hypoechoic shadowing mass at the 9 o'clock position of the right breast measuring 2.7 cm x 3 cm x 1.9 cm demonstrating multiple highly suspicious features. Recommended ultrasound-guided biopsy of the right breast lesion was performed on 10/5/22 with pathology revealing ER 98%/KY 92% positive, HER-2 (0) negative, high grade infiltrating ductal carcinoma. MRI of the bilateral breasts as completed on 10/19/22 demonstrating right 9:00 breast cancer measuring up to 6.4 cm by MRI , with no suspicious left breast finding and no obvious lymphadenopathy. She is now referred to [facility] for Medical Oncology evaluation and treatment of newly diagnosed breast cancer. She was also referred to surgery and is scheduled for initial surgical consultation with Surgeon, Dr. Leah Nageotte, on 10/24/22. She is doing well, no complaints today. Review of Systems:  Constitutional: Negative. HENT: Negative. Eyes: Negative. Respiratory: Negative. Cardiovascular: Negative. Gastrointestinal: Negative. Genitourinary: Negative. Musculoskeletal: Negative. Skin: Negative. Neurological: Negative. Endo/Heme/Allergies: Negative. Psychiatric/Behavioral: Negative. All other systems reviewed and are negative. No Known Allergies  No past medical history on file. Past Surgical History:   Procedure Laterality Date    OTHER SURGICAL HISTORY      right lymph node excision    US BREAST NEEDLE BIOPSY RIGHT Right 10/5/2022    US BREAST NEEDLE BIOPSY RIGHT 10/5/2022 Lurlene Ormond, MD [facility] RADIOLOGY MAMMO     Family History   Problem Relation Age of Onset    Thyroid Disease Mother     Heart Disease Mother     High Blood Pressure Father     Heart Disease Father     Thyroid Disease Father     Cancer Sister 48        lung    Diabetes Neg Hx      Social History     Socioeconomic History    Marital status:      Spouse name: Not on file    Number of children: Not on file    Years of education: Not on file    Highest education level: Not on file   Occupational History    Not on file   Tobacco Use    Smoking status: Never    Smokeless tobacco: Never   Vaping Use    Vaping Use: Never used   Substance and Sexual Activity    Alcohol use: Yes    Drug use: Never    Sexual activity: Not on file   Other Topics Concern    Not on file   Social History Narrative    Not on file     Social Determinants of Health     Financial Resource Strain: Low Risk     Difficulty of Paying Living Expenses: Not very hard   Food Insecurity: No Food Insecurity    Worried About Running Out of Food in the Last Year: Never true    Ran Out of Food in the Last Year: Never true   Transportation Needs: No Transportation Needs    Lack of Transportation (Medical): No    Lack of Transportation (Non-Medical):  No   Physical Activity: Inactive    Days of Exercise per Week: 0 days    Minutes of Exercise per Session: 0 min   Stress: No Stress Concern Present    Feeling of Stress : Not at all   Social Connections: Unknown    Frequency of Communication with Friends and Family: More than three times a week    Frequency of Social Gatherings with Friends and Family: More than three times a week    Attends Religion Services: Not on file    Active Member of Clubs or Organizations: Not on file    Attends Club or Organization Meetings: Not on file    Marital Status:    Intimate Partner Violence: Not At Risk    Fear of Current or Ex-Partner: No    Emotionally Abused: No    Physically Abused: No    Sexually Abused: No   Housing Stability: Unknown    Unable to Pay for Housing in the Last Year: No    Number of [place] in the Last Year: Not on file    Unstable Housing in the Last Year: No     Current Outpatient Medications   Medication Sig Dispense Refill    Multiple Vitamins-Minerals (MULTIVITAMIN ADULTS 50+ PO) Take by mouth daily      Omega-3 Fatty Acids (FISH OIL) 1200 MG CAPS Take by mouth daily      rosuvastatin (CRESTOR) 20 MG tablet Take 1 tablet by mouth nightly 90 tablet 3    zoster recombinant adjuvanted vaccine (SHINGRIX) 50 MCG/0.5ML SUSR injection Inject 0.5 mLs into the muscle See Admin Instructions 1 dose now and repeat in 2-6 months 0.5 mL 0     No current facility-administered medications for this visit. OBJECTIVE:  BP (!) 153/97 (Site: Right Upper Arm, Position: Sitting, Cuff Size: Medium Adult)   Pulse 85   Temp 97.9 °F (36.6 °C) (Oral)   Resp 17   Ht 5' 2.5\" (1.588 m)   Wt 179 lb (81.2 kg)   SpO2 96%   BMI 32.22 kg/m²     Physical Exam:  Constitutional: Well developed, well nourished female in no acute distress, sitting comfortably on the examination table. HEENT: Normocephalic and atraumatic. Sclerae anicteric. Skin Warm and dry. No bruising and no rash noted. No erythema. No pallor. Cardiopulmonary Deferred. Neuro Grossly nonfocal with no obvious sensory or motor deficits. MSK Normal range of motion in general.  No edema and no tenderness. Psych Appropriate mood and affect. Labs:  No results found for this or any previous visit (from the past 24 hour(s)).     Imaging:  MRI BREAST BILATERAL W WO CONTRAST    Result Date: 10/20/2022  MRI of the Breasts with and without contrast CLINICAL INDICATION:  New diagnosis of right breast 9:00 infiltrating ductal carcinoma high grade undergoing evaluation for extent of disease, staging, therapy planning. No previous personal malignancy or breast surgery in the past otherwise. COMPARISON: Ultrasound and mammography 10/5/2022, 9/27/2022, 9/9/2022 TECHNIQUE: Standard MRI sequences were obtained through the breasts in multiple planes. Images were obtained before and after intravenous infusion of 17 mL of Prohance contrast. Images were reviewed with PACS and with [employer] CAD software. FINDINGS: The breasts demonstrate moderate glandularity and mild background enhancement. Right breast: An 9:00 midposterior depth are biopsy changes within the irregular heterogeneously enhancing malignant mass measuring up to 4.0 x 2.6 x 3.3 cm. Anterior to the mass (within 1.5 cm of the anterior margin) system are 2 tiny 3 mm satellite nodules. Overall the MRI area of concern measures up to 6.4 cm AP. Note that on recent mammography, malignant-type calcifications extending anteriorly and posteriorly from the mass for total AP extent of at least 8 cm. Left breast: No evidence of suspicious enhancing mass, and no dominant or unique nonmass enhancement, to suggest malignancy. Lymph nodes: No obvious axillary or internal mammary lymphadenopathy. Several bilateral axillary lymph nodes are relatively symmetric in size and number, all demonstrating preservation of expected reniform shape with fatty grant. Elsewhere colon limited inclusion of the thorax and upper abdomen shows a partially visualized hiatal hernia which is not entirely seen or evaluated here. 1. Right 9:00 breast cancer measures up to 6.4 cm by MRI (recent mammography demonstrated at least 8 cm of expected disease). 2. No suspicious left breast finding. 3. No obvious lymphadenopathy. Recommend continued malignancy management as directed clinically.  BI-RADS Assessment Category 6: Known Biopsy Proven Malignancy     ZIA DIGITAL DIAGNOSTIC W OR WO CAD RIGHT    Result Date: 9/27/2022  RIGHT BREAST DIAGNOSTIC MAMMOGRAM and RIGHT BREAST ULTRASOUND, 9/27/2022. CLINICAL HISTORY: Abnormal screening mammogram. COMPARISON STUDY: Screening mammogram 9/9/2020. FINDINGS: RIGHT BREAST DIAGNOSTIC MAMMOGRAM: Straight mediolateral view of the right breast as well as compression images of the right breast in the CC, and MLO plane are submitted for evaluation. Masslike density and associated calcifications persist in the outer right breast. These demonstrate suspicious features with spiculated margins and suggest architectural distortion. Additional associated calcifications also suspicious. Further characterization with focused diagnostic ultrasound is recommended. RIGHT BREAST ULTRASOUND, 9/27/2022. CLINICAL HISTORY: Abnormal screening and diagnostic mammogram. Technique: Grayscale, and Doppler imaging of the right breast soft tissues was performed using a 15 MHz transducer. FINDINGS: Focused ultrasound imaging at the 9 o'clock position of the right breast in the area of masslike density as seen on prior mammogram imaging shows a hypoechoic shadowing mass measuring 2.7 cm x 3 cm x 1.9 cm demonstrating multiple highly suspicious features such as irregular margins, shadowing, and a vertical configuration for which a benign process cannot be confirmed. 1. Highly suspicious right breast mass. Further evaluation with ultrasound-guided biopsy is recommended. BI-RADS Assessment Category 5: Highly suggestive of malignancy- Appropriate action should be taken. US BREAST LIMITED RIGHT    Result Date: 9/27/2022  RIGHT BREAST DIAGNOSTIC MAMMOGRAM and RIGHT BREAST ULTRASOUND, 9/27/2022. CLINICAL HISTORY: Abnormal screening mammogram. COMPARISON STUDY: Screening mammogram 9/9/2020.  FINDINGS: RIGHT BREAST DIAGNOSTIC MAMMOGRAM: Straight mediolateral view of the right breast as well as compression images of the right breast in the CC, and MLO plane are submitted for evaluation. Masslike density and associated calcifications persist in the outer right breast. These demonstrate suspicious features with spiculated margins and suggest architectural distortion. Additional associated calcifications also suspicious. Further characterization with focused diagnostic ultrasound is recommended. RIGHT BREAST ULTRASOUND, 9/27/2022. CLINICAL HISTORY: Abnormal screening and diagnostic mammogram. Technique: Grayscale, and Doppler imaging of the right breast soft tissues was performed using a 15 MHz transducer. FINDINGS: Focused ultrasound imaging at the 9 o'clock position of the right breast in the area of masslike density as seen on prior mammogram imaging shows a hypoechoic shadowing mass measuring 2.7 cm x 3 cm x 1.9 cm demonstrating multiple highly suspicious features such as irregular margins, shadowing, and a vertical configuration for which a benign process cannot be confirmed. 1. Highly suspicious right breast mass. Further evaluation with ultrasound-guided biopsy is recommended. BI-RADS Assessment Category 5: Highly suggestive of malignancy- Appropriate action should be taken. MAMMOGRAM POST BX CLIP PLACEMENT RIGHT    Result Date: 10/5/2022  POSTBIOPSY MAMMOGRAM, 10/5/2022. CLINICAL HISTORY: Status post percutaneous biopsy. FINDINGS: CC, ML views of the right breast demonstrate the biopsy clip in the outer soft tissues of the right breast. No significant post biopsy hematoma is seen. There has been successful placement of the biopsy clip occurring within the more lateral aspect of the indeterminate mass. 1. Successful biopsy and placement of marker clip. This is a post biopsy mammogram and does not require BI-RADS categorization.     US BREAST BIOPSY W LOC DEVICE 1ST LESION RIGHT    Addendum Date: 10/12/2022    Addendum: Phu Huizar, accession number: [id number] Date: 10/5/2022 Pathology was noted as A: Right breast, 9:00 position, 12 cm from nipple, core biopsy: Infiltrating ductal carcinoma, high grade (poorly differentiated). Definite in situ component and lymphovascular invasion are not identified. Results concordant with imaging. Pathology report was faxed to  [address] [place] [address]'s office on 10/6/2022 by RT Ramirez ([email or web address]). Patient was referred to Oncology services on 10/6/2022. Result Date: 10/12/2022  Ultrasound-guided right breast biopsy, 10/5/2022. CLINICAL HISTORY: Right breast lesion. PROCEDURE: After obtaining written informed consent, the patient was placed in a supine position on the ultrasound table. Preliminary imaging demonstrates the 2.5 cm x 2.5 cm x 2.4 cm hypoechoic lesion at the 9 o'clock position of the right breast. The right breast was prepped and draped in the usual sterile fashion. Lidocaine was used for local anesthesia. Using ultrasound guidance, a 14-gauge Assure needle was positioned just proximal to the lesion with the sampling trough subsequently placed through the lesion. A total of 5 samples were taken with ultrasound documentation of each pass. A biopsy clip was placed at the site of biopsy. The biopsy apparatus was removed and hemostasis was achieved. No procedure or immediate postprocedure complications were noted. The patient left the department in good condition. 1. Successful biopsy of right breast lesion with histologic results pending. Pathology:  DIAGNOSIS        A:  \" RIGHT BREAST, 9:00 POSITION, 12 CM FROM NIPPLE, CORE BIOPSY\":         INFILTRATING DUCTAL CARCINOMA, HIGH GRADE (POORLY DIFFERENTIATED).    DEFINITE IN SITU COMPONENT AND LYMPHOVASCULAR INVASION ARE NOT IDENTIFIED             Procedures/Addenda                     STF- ER/WA/[id number] BY IHC                                                                                                                                         Status:  Signed Out    Leon Marrero MD on 10/10/2022                                 Interpretation [facility] IHC Quantitative Breast Panel Result: A1     TEST NAME:                         RESULTS:               INTERNAL   CONTROLS:     ESTROGEN RECEPTOR:               Positive (98%)               Present,   positive   PROGESTERONE RECEPTOR:          Positive (92%)               Present,   positive   HER-2/SHARDA:                         Negative (0)               Percentage   of Cells with Uniform        Intense Complete Membrane   Stainin%           ASSESSMENT:   Diagnosis Orders   1. Malignant neoplasm of overlapping sites of right breast in female, estrogen receptor positive [facility])          Patient Active Problem List   Diagnosis    Elevated liver function tests    Dyslipidemia    Weight gain    Renal insufficiency           PLAN:  Lab studies and imaging studies were personally reviewed. Pertinent old records were reviewed. Breast cancer: 3cm by ultrasound but up to 6.4 cm on MRI (including two tiny satellite nodules), high grade, ER/UT strongly positive, HER2 0. I discussed the pathophysiology and natural history of breast cancer with Ms. Engel and her family. The usual treatment modalities employed for breast cancer include surgery, chemotherapy, radiation, or endocrine therapy in some combination. Normally we would recommend upfront surgery for a tumor with strong HR expression and negative HER2, but the size of the tumor on MRI suggests that she cannot have breast conserving surgery unless she has some cytoreduction prior. She meets with Dr. Deysi Black on Monday and he will discuss this further. If she does strongly desire BCT and it is not feasible with her current tumor dimensions, I would recommend AC-T for neoadjuvant therapy.   On the other hand, if she is willing to consider mastectomy, I would favor upfront surgery as the questionable tumor dimensions, the clinically negative nodes, and the strong HR expression may indicate that gene recurrence testing would be of benefit and chemotherapy unnecessary. She and her family understand and will consider these through the weekend prior to discussing with surgery on Monday. Radiation would be after surgery or chemotherapy and would depend on surgical pathology, but she will definitely require endocrine therapy. All questions were asked and answered to the best of my ability. In all, I spent 60 minutes in the care of Ms. Engel today, over 50% of which was in direct counseling and coordination of care. I will plan to see her after surgery unless she opts for neoadjuvant treatment.            Natan Avalos MD, MD  [facility] Hematology and Oncology  [address] [place] [address]  Office : [phone]  Fax : [phone]

## 2022-10-21 NOTE — PATIENT INSTRUCTIONS
Patient Instructions from Today's Visit    Reason for Visit:  New Patient - Breast    Plan:  - The cancer is contained to the right breast, which is great news. - The tumor is larger on the MRI than we initially thought on the Ultrasound. - If you were to have surgery now, you would end up having to take out a very large part of the breast tissue. For that reason, they may not recommend having a lumpectomy given the size of your tumor.   - They may recommend a Mastectomy which removes all of the breast tissue.   - Your tumor is very responsive to hormone receptors. - Your tumor was negative for HER2, which is great. - If you want to preserve your breast tissue (a breast conserving surgery), it would be best for you to do treatment first.   - The need for chemotherapy is to shrink the tumor. Doing this would make the tumor smaller before surgery. - Right now, the only decision that you need to make is if you would rather have surgery first or chemotherapy. The size of the tumor suggests that we cannot just do a lumpectomy with out chemotherapy first. The idea is that with Chemotherapy you would not have to have reconstructive surgery. We would anticipate chemo reducing the size of the mass. Follow Up: If you decide to go forward with surgery, we will see you a couple of weeks after surgery to go over all of the results and regroup. If you decide to do chemotherapy prior to surgery, we would get you set up for all of that sooner. Recent Lab Results:  No visits with results within 3 Day(s) from this visit.    Latest known visit with results is:   Office Visit on 07/27/2022   Component Date Value Ref Range Status    Cholesterol, Total 07/27/2022 256 (A)  <200 MG/DL Final    Comment: Borderline High: 200-239 mg/dL  High: Greater than or equal to 240 mg/dL      Triglycerides 07/27/2022 161 (A)  35 - 150 MG/DL Final    Comment: Borderline High: 150-199 mg/dL, High: 200-499 mg/dL  Very High: Greater than or equal to 500 mg/dL      HDL 07/27/2022 42  40 - 60 MG/DL Final    LDL Calculated 07/27/2022 181.8 (A)  <100 MG/DL Final    Comment: Near Optimal: 100-129 mg/dL  Borderline High: 130-159, High: 160-189 mg/dL  Very High: Greater than or equal to 190 mg/dL      VLDL Cholesterol Calculated 07/27/2022 32.2 (A)  6.0 - 23.0 MG/DL Final    Chol/HDL Ratio 07/27/2022 6.1    Final    WBC 07/27/2022 5.6  4.3 - 11.1 K/uL Final    RBC 07/27/2022 5.42 (A)  4.05 - 5.2 M/uL Final    Hemoglobin 07/27/2022 15.3  11.7 - 15.4 g/dL Final    Hematocrit 07/27/2022 48.0 (A)  35.8 - 46.3 % Final    MCV 07/27/2022 88.6  79.6 - 97.8 FL Final    MCH 07/27/2022 28.2  26.1 - 32.9 PG Final    MCHC 07/27/2022 31.9  31.4 - 35.0 g/dL Final    RDW 07/27/2022 13.3  11.9 - 14.6 % Final    Platelets 28/15/5250 281  150 - 450 K/uL Final    MPV 07/27/2022 9.8  9.4 - 12.3 FL Final    nRBC 07/27/2022 0.00  0.0 - 0.2 K/uL Final    **Note: Absolute NRBC parameter is now reported with Hemogram**    Differential Type 07/27/2022 AUTOMATED    Final    Seg Neutrophils 07/27/2022 47  43 - 78 % Final    Lymphocytes 07/27/2022 42  13 - 44 % Final    Monocytes 07/27/2022 9  4.0 - 12.0 % Final    Eosinophils % 07/27/2022 1  0.5 - 7.8 % Final    Basophils 07/27/2022 1  0.0 - 2.0 % Final    Immature Granulocytes 07/27/2022 0  0.0 - 5.0 % Final    Segs Absolute 07/27/2022 2.6  1.7 - 8.2 K/UL Final    Absolute Lymph # 07/27/2022 2.3  0.5 - 4.6 K/UL Final    Absolute Mono # 07/27/2022 0.5  0.1 - 1.3 K/UL Final    Absolute Eos # 07/27/2022 0.1  0.0 - 0.8 K/UL Final    Basophils Absolute 07/27/2022 0.0  0.0 - 0.2 K/UL Final    Absolute Immature Granulocyte 07/27/2022 0.0  0.0 - 0.5 K/UL Final    Sodium 07/27/2022 142  136 - 145 mmol/L Final    Potassium 07/27/2022 4.6  3.5 - 5.1 mmol/L Final    Chloride 07/27/2022 109 (A)  98 - 107 mmol/L Final    CO2 07/27/2022 27  21 - 32 mmol/L Final    Anion Gap 07/27/2022 6 (A)  7 - 16 mmol/L Final    Glucose 07/27/2022 87  65 - 100 mg/dL Final    BUN 07/27/2022 13  8 - 23 MG/DL Final    Creatinine 07/27/2022 1.00  0.6 - 1.0 MG/DL Final    GFR  07/27/2022 >60  >60 ml/min/1.73m2 Final    GFR Non- 07/27/2022 59 (A)  >60 ml/min/1.73m2 Final    Comment:   Estimated GFR is calculated using the Modification of Diet in Renal Disease (MDRD) Study equation, reported for both  Americans (GFRAA) and non- Americans (GFRNA), and normalized to 1.73m2 body surface area. The physician must decide which value applies to the patient. The MDRD study equation should only be used in individuals age 25 or older. It has not been validated for the following: pregnant women, patients with serious comorbid conditions,or on certain medications, or persons with extremes of body size, muscle mass, or nutritional status. Calcium 07/27/2022 9.3  8.3 - 10.4 MG/DL Final    Total Bilirubin 07/27/2022 0.6  0.2 - 1.1 MG/DL Final    ALT 07/27/2022 36  12 - 65 U/L Final    AST 07/27/2022 21  15 - 37 U/L Final    Alk Phosphatase 07/27/2022 73  50 - 136 U/L Final    Total Protein 07/27/2022 7.1  6.3 - 8.2 g/dL Final    Albumin 07/27/2022 4.1  3.2 - 4.6 g/dL Final    Globulin 07/27/2022 3.0  2.3 - 3.5 g/dL Final    Albumin/Globulin Ratio 07/27/2022 1.4  1.2 - 3.5   Final    TSH, 3RD GENERATION 07/27/2022 1.420  0.358 - 3.740 uIU/mL Final         Treatment Summary has been discussed and given to patient: n/a        -------------------------------------------------------------------------------------------------------------------  Please call our office at [phone] if you have any  of the following symptoms:   Fever of 100.5 or greater  Chills  Shortness of breath  Swelling or pain in one leg    After office hours an answering service is available and will contact a provider for emergencies or if you are experiencing any of the above symptoms. Patient did not express an interest in My Chart.   My Chart log in information explained on the after visit summary printout at the [facility] desk. Makenzie Greer MA    Once you make a decision, please give Jameel Squires or Benny Ma a call.      [id number]  Expires: 11/25/2022 11:56 AM

## 2022-10-24 ENCOUNTER — OFFICE VISIT (OUTPATIENT)
Dept: SURGERY | Age: 67
End: 2022-10-24
Payer: MEDICARE

## 2022-10-24 VITALS
HEART RATE: 75 BPM | SYSTOLIC BLOOD PRESSURE: 142 MMHG | WEIGHT: 176.3 LBS | BODY MASS INDEX: 31.73 KG/M2 | DIASTOLIC BLOOD PRESSURE: 84 MMHG

## 2022-10-24 DIAGNOSIS — Z17.0 MALIGNANT NEOPLASM OF OVERLAPPING SITES OF RIGHT BREAST IN FEMALE, ESTROGEN RECEPTOR POSITIVE (HCC): Primary | ICD-10-CM

## 2022-10-24 DIAGNOSIS — C50.811 MALIGNANT NEOPLASM OF OVERLAPPING SITES OF RIGHT BREAST IN FEMALE, ESTROGEN RECEPTOR POSITIVE (HCC): Primary | ICD-10-CM

## 2022-10-24 PROCEDURE — G8427 DOCREV CUR MEDS BY ELIG CLIN: HCPCS | Performed by: SURGERY

## 2022-10-24 PROCEDURE — 3017F COLORECTAL CA SCREEN DOC REV: CPT | Performed by: SURGERY

## 2022-10-24 PROCEDURE — G8417 CALC BMI ABV UP PARAM F/U: HCPCS | Performed by: SURGERY

## 2022-10-24 PROCEDURE — G8484 FLU IMMUNIZE NO ADMIN: HCPCS | Performed by: SURGERY

## 2022-10-24 PROCEDURE — 1090F PRES/ABSN URINE INCON ASSESS: CPT | Performed by: SURGERY

## 2022-10-24 PROCEDURE — 99204 OFFICE O/P NEW MOD 45 MIN: CPT | Performed by: SURGERY

## 2022-10-24 PROCEDURE — 1123F ACP DISCUSS/DSCN MKR DOCD: CPT | Performed by: SURGERY

## 2022-10-24 PROCEDURE — G8399 PT W/DXA RESULTS DOCUMENT: HCPCS | Performed by: SURGERY

## 2022-10-24 PROCEDURE — 1036F TOBACCO NON-USER: CPT | Performed by: SURGERY

## 2022-10-24 ASSESSMENT — ENCOUNTER SYMPTOMS
GASTROINTESTINAL NEGATIVE: 1
ALLERGIC/IMMUNOLOGIC NEGATIVE: 1
RESPIRATORY NEGATIVE: 1
EYES NEGATIVE: 1

## 2022-10-24 NOTE — PROGRESS NOTES
10/24/2022    Sarah aOkley  MRN: [id number]      CHIEF COMPLAINT: Right breast cancer      PRIMARY CARE PHYSICIAN: Nina Link MD      HISTORY:  Underwent screening mammogram.  Last was in March 2010. Imaging showed a right breast mass and calcifications. Biopsy was done showing infiltrating ductal carcinoma, high-grade, ER/NV positive, HER2 negative. Showed the right breast mass at 9:00 measuring up to 6.4 cm by MRI and mammogram showing an estimated 8 cm of expected disease. MRI showed the dominant mass and likely satellite nodules anterior to the mass. She denies palpable breast masses, nipple discharge, or breast pain. No family history of breast cancer. REVIEW OF SYSTEMS:  Review of Systems   Constitutional: Negative. HENT: Negative. Eyes: Negative. Respiratory: Negative. Cardiovascular: Negative. Gastrointestinal: Negative. Endocrine: Negative. Genitourinary: Negative. Musculoskeletal: Negative. Skin: Negative. Allergic/Immunologic: Negative. Neurological: Negative. Hematological: Negative. Psychiatric/Behavioral: Negative. History reviewed. No pertinent past medical history. Current Outpatient Medications   Medication Sig Dispense Refill    Multiple Vitamins-Minerals (MULTIVITAMIN ADULTS 50+ PO) Take by mouth daily      Omega-3 Fatty Acids (FISH OIL) 1200 MG CAPS Take by mouth daily      rosuvastatin (CRESTOR) 20 MG tablet Take 1 tablet by mouth nightly 90 tablet 3    zoster recombinant adjuvanted vaccine [facility]) 50 MCG/0.5ML SUSR injection Inject 0.5 mLs into the muscle See Admin Instructions 1 dose now and repeat in 2-6 months (Patient not taking: Reported on 10/24/2022) 0.5 mL 0     No current facility-administered medications for this visit.        Family History   Problem Relation Age of Onset    Thyroid Disease Mother     Heart Disease Mother     High Blood Pressure Father     Heart Disease Father     Thyroid Disease Father     Cancer Sister 48        lung    Diabetes Neg Hx        Social History     Socioeconomic History    Marital status:      Spouse name: None    Number of children: None    Years of education: None    Highest education level: None   Tobacco Use    Smoking status: Never    Smokeless tobacco: Never   Vaping Use    Vaping Use: Never used   Substance and Sexual Activity    Alcohol use: Yes    Drug use: Never     Social Determinants of Health     Financial Resource Strain: Low Risk     Difficulty of Paying Living Expenses: Not very hard   Food Insecurity: No Food Insecurity    Worried About Running Out of Food in the Last Year: Never true    Oswald of Food in the Last Year: Never true   Transportation Needs: No Transportation Needs    Lack of Transportation (Medical): No    Lack of Transportation (Non-Medical): No   Physical Activity: Inactive    Days of Exercise per Week: 0 days    Minutes of Exercise per Session: 0 min   Stress: No Stress Concern Present    Feeling of Stress : Not at all   Social Connections: Unknown    Frequency of Communication with Friends and Family: More than three times a week    Frequency of Social Gatherings with Friends and Family: More than three times a week    Marital Status:    Intimate Partner Violence: Not At Risk    Fear of Current or Ex-Partner: No    Emotionally Abused: No    Physically Abused: No    Sexually Abused: No   Housing Stability: Unknown    Unable to Pay for Housing in the Last Year: No    Unstable Housing in the Last Year: No         PHYSICAL EXAMINATION:  Physical Exam  Constitutional:       Appearance: Normal appearance. HENT:      Head: Normocephalic and atraumatic. Nose: Nose normal.      Mouth/Throat:      Mouth: Mucous membranes are moist.   Eyes:      Extraocular Movements: Extraocular movements intact. Pupils: Pupils are equal, round, and reactive to light.    Cardiovascular:      Rate and Rhythm: Normal rate and regular rhythm. Pulmonary:      Effort: Pulmonary effort is normal.   Chest:   Breasts:     Right: No inverted nipple or nipple discharge. Left: No inverted nipple, mass or nipple discharge. Comments: Fullness in the central aspect of the right breast  Abdominal:      General: There is no distension. Palpations: Abdomen is soft. Tenderness: There is no abdominal tenderness. Musculoskeletal:         General: Normal range of motion. Cervical back: Normal range of motion and neck supple. Lymphadenopathy:      Upper Body:      Right upper body: No supraclavicular or axillary adenopathy. Left upper body: No supraclavicular or axillary adenopathy. Skin:     General: Skin is warm and dry. Coloration: Skin is not jaundiced. Neurological:      General: No focal deficit present. Mental Status: She is alert and oriented to person, place, and time. Sensory: Sensation is intact. Psychiatric:         Mood and Affect: Mood normal.         Behavior: Behavior normal.         Thought Content: Thought content normal.        BP (!) 142/84   Pulse 75   Wt 176 lb 4.8 oz (80 kg)   BMI 31.73 kg/m²       STUDIES:  MRI Result (most recent):  MRI BREAST BILATERAL W WO CONTRAST 10/19/2022    Narrative  MRI of the Breasts with and without contrast    CLINICAL INDICATION:  New diagnosis of right breast 9:00 infiltrating ductal  carcinoma high grade undergoing evaluation for extent of disease, staging,  therapy planning. No previous personal malignancy or breast surgery in the past  otherwise. COMPARISON: Ultrasound and mammography 10/5/2022, 9/27/2022, 9/9/2022    TECHNIQUE: Standard MRI sequences were obtained through the breasts in multiple  planes. Images were obtained before and after intravenous infusion of 17 mL of  Prohance contrast. Images were reviewed with PACS and with [email or web address] CAD software.     FINDINGS: The breasts demonstrate moderate glandularity and mild background  enhancement. Right breast: An 9:00 midposterior depth are biopsy changes within the irregular  heterogeneously enhancing malignant mass measuring up to 4.0 x 2.6 x 3.3 cm. Anterior to the mass (within 1.5 cm of the anterior margin) system are 2 tiny 3  mm satellite nodules. Overall the MRI area of concern measures up to 6.4 cm AP. Note that on recent mammography, malignant-type calcifications extending  anteriorly and posteriorly from the mass for total AP extent of at least 8 cm. Left breast: No evidence of suspicious enhancing mass, and no dominant or unique  nonmass enhancement, to suggest malignancy. Lymph nodes: No obvious axillary or internal mammary lymphadenopathy. Several  bilateral axillary lymph nodes are relatively symmetric in size and number, all  demonstrating preservation of expected reniform shape with fatty grant. Elsewhere colon limited inclusion of the thorax and upper abdomen shows a  partially visualized hiatal hernia which is not entirely seen or evaluated here. Impression  1. Right 9:00 breast cancer measures up to 6.4 cm by MRI (recent mammography  demonstrated at least 8 cm of expected disease). 2. No suspicious left breast finding. 3. No obvious lymphadenopathy. Recommend continued malignancy management as directed clinically. BI-RADS Assessment Category 6: Known Biopsy Proven Malignancy    IMPRESSION:  Large breast cancer likely satellite lesions, infiltrating ductal carcinoma, ER/MS positive HER2 negative. Long discussion today about breast cancer and treatments including lumpectomy, mastectomy, SLNB/ALND, chemotherapy, radiation. She met with the medical oncologist.  The discussion revolved around possible neoadjuvant treatment to reduce the size of the lesion followed by surgical treatment.   The alternative would be upfront surgical treatment with a mastectomy and sentinel node biopsy followed by additional appropriate treatments based on the pathology. I reviewed the imaging results with her. Based on the size of the lesion along with associated satellite lesions she is more likely a candidate for mastectomy with or without reconstruction along with a sentinel node biopsy. This would allow us there all of the tumor from the breast along with final pathology results on which to base additional treatment recommendations. She wants to talk with her family and decide which option she prefers. She will call us when she is ready to schedule. ASSESSMENT/PLAN:  There are no diagnoses linked to this encounter. She will call to finalize surgical plan.     Cyndee Louis MD

## 2022-11-03 ENCOUNTER — CLINICAL DOCUMENTATION (OUTPATIENT)
Dept: CASE MANAGEMENT | Age: 67
End: 2022-11-03

## 2022-11-08 ENCOUNTER — PREP FOR PROCEDURE (OUTPATIENT)
Dept: SURGERY | Age: 67
End: 2022-11-08

## 2022-11-08 ENCOUNTER — TELEPHONE (OUTPATIENT)
Dept: SURGERY | Age: 67
End: 2022-11-08

## 2022-11-08 DIAGNOSIS — Z17.0 MALIGNANT NEOPLASM OF OVERLAPPING SITES OF RIGHT BREAST IN FEMALE, ESTROGEN RECEPTOR POSITIVE (HCC): Primary | ICD-10-CM

## 2022-11-08 DIAGNOSIS — C50.811 MALIGNANT NEOPLASM OF OVERLAPPING SITES OF RIGHT BREAST IN FEMALE, ESTROGEN RECEPTOR POSITIVE (HCC): Primary | ICD-10-CM

## 2022-11-08 NOTE — TELEPHONE ENCOUNTER
Pt requested a prescription for a post surgical camisole after her mastectomy. I sent a message to Dr. Jammie Russo and will give her a call back once he responds. Pt voiced understanding.

## 2022-11-10 NOTE — PROGRESS NOTES
HISTORY OF PRESENT ILLNESS  Chief Complaint   Patient presents with    Hyperlipidemia     Recheck        Having a mastectomy next month. MRI showed no positive nodes. Is trying to walk more. Works 3 12h days. Walking the Dog . .. Sleeping ok. She is sometimes worrying about things. At first thought that the Crestor gave her some pains but moved it to dinner time and it is better. Hyperlipidemia  Patient presents for follow up evaluation of hyperlipidemia. Diet and Lifestyle was discussed. Pertinent ROS: taking medications as instructed, no medication side effects noted, no TIA's, no chest pain on exertion, no dyspnea on exertion, no swelling of ankles. Risk factors for vascular disease consist of hyperlipidemia. Key CAD CHF Meds            lisinopril (PRINIVIL;ZESTRIL) 10 MG tablet (Taking)    Sig - Route: Take 1 tablet by mouth daily - Oral    rosuvastatin (CRESTOR) 20 MG tablet (Taking)    Sig - Route: Take 1 tablet by mouth nightly - Oral    Notes to Pharmacy: Please notify the patient that the prescriptions have been sent in. Lab Results   Component Value Date    CHOL 128 11/11/2022    CHOL 256 (H) 07/27/2022    CHOL 230 (H) 07/01/2021     Lab Results   Component Value Date    TRIG 116 11/11/2022    TRIG 161 (H) 07/27/2022    TRIG 95 07/01/2021     Lab Results   Component Value Date    HDL 47 11/11/2022    HDL 42 07/27/2022    HDL 48 07/01/2021     Lab Results   Component Value Date    LDLCALC 57.8 11/11/2022    LDLCALC 181.8 (H) 07/27/2022    LDLCALC 165 (H) 07/01/2021     Lab Results   Component Value Date    LABVLDL 23.2 (H) 11/11/2022    LABVLDL 32.2 (H) 07/27/2022    LABVLDL 25 08/14/2019    VLDL 17 07/01/2021     Lab Results   Component Value Date    CHOLHDLRATIO 2.7 11/11/2022    CHOLHDLRATIO 6.1 07/27/2022      Worse with high fat or high sugar diet. Improves with exercise and healthy diet .   BP Readings from Last 3 Encounters:   11/11/22 (!) 151/88   10/24/22 (!) 142/84   10/21/22 (!) 153/97      Wt Readings from Last 3 Encounters:   11/11/22 177 lb (80.3 kg)   10/24/22 176 lb 4.8 oz (80 kg)   10/21/22 179 lb (81.2 kg)      The ASCVD Risk score (Juan DAN, et al., 2019) failed to calculate for the following reasons: The valid total cholesterol range is 130 to 320 mg/dL     Hyperlipidemia  This is a chronic problem. The current episode started more than 1 year ago. The problem is uncontrolled. Recent lipid tests were reviewed and are high. She has no history of chronic renal disease, diabetes, hypothyroidism, liver disease, obesity or nephrotic syndrome. There are no known factors aggravating her hyperlipidemia. Pertinent negatives include no myalgias or shortness of breath. Review of Systems   Constitutional:  Negative for activity change, appetite change, chills, fatigue and fever. HENT:  Negative for congestion and rhinorrhea. Respiratory:  Negative for cough, shortness of breath and wheezing. Gastrointestinal:  Negative for abdominal pain, constipation, diarrhea, nausea and vomiting. Genitourinary:  Negative for dysuria. Musculoskeletal:  Negative for arthralgias and myalgias. Neurological:  Negative for dizziness and headaches. Psychiatric/Behavioral:  Positive for sleep disturbance. Negative for dysphoric mood. The patient is not nervous/anxious. Patient Active Problem List   Diagnosis    Elevated liver function tests    Dyslipidemia    Weight gain    Renal insufficiency    Malignant neoplasm of overlapping sites of right breast in female, estrogen receptor positive ([facility] [address]    Primary hypertension         Blood pressure (!) 151/88, pulse 72, temperature (!) 96.6 °F (35.9 °C), temperature source Temporal, resp. rate 16, height 5' 2.5\" (1.588 m), weight 177 lb (80.3 kg), SpO2 98 %. Pain Scale: 0 - No pain/10 Pain Location:   Body mass index is 31.86 kg/m². Physical Exam  Vitals and nursing note reviewed.    Constitutional:       General: She is not in acute distress. Appearance: Normal appearance. She is normal weight. She is not toxic-appearing. HENT:      Head: Normocephalic and atraumatic. Eyes:      General: Lids are normal.      Extraocular Movements: Extraocular movements intact. Conjunctiva/sclera: Conjunctivae normal.      Pupils: Pupils are equal.   Cardiovascular:      Rate and Rhythm: Normal rate and regular rhythm. Heart sounds: Normal heart sounds. No murmur heard. No friction rub. No gallop. Pulmonary:      Effort: Pulmonary effort is normal. No respiratory distress. Breath sounds: Normal breath sounds. No wheezing or rales. Musculoskeletal:      Right lower leg: No edema. Left lower leg: No edema. Skin:     General: Skin is warm and dry. Neurological:      General: No focal deficit present. Mental Status: She is alert. Psychiatric:         Mood and Affect: Mood normal.         Behavior: Behavior normal.         Thought Content: Thought content normal.         Judgment: Judgment normal.            ASSESSMENT and PLAN   Diagnosis Orders   1. Dyslipidemia  Lipid Panel    Lipid Panel      2. Encounter for immunization  Influenza, FLUAD, (age 72 y+), IM, PF, 0.5 mL      3. Primary hypertension  lisinopril (PRINIVIL;ZESTRIL) 10 MG tablet          Reviewed medications and side effects in detail    AWE scheduled - will plan to do follow up labs then too unless done as part of her treatment. Add melatonin if needed for sleep. The current medical regimen  is  effective. She will continue present plan and medications including:  Requested Prescriptions     Signed Prescriptions Disp Refills    lisinopril (PRINIVIL;ZESTRIL) 10 MG tablet 90 tablet 1     Sig: Take 1 tablet by mouth daily             Her other chronic conditions are stable at this time. She is stable on the current treatment and tolerating it well. No significant sides effects.   Will not adjust therapy at this time, unless noted above. We will continue to monitor for any problems. Medications refilled and lab work has been ordered where needed. Reviewed medications are explained including any potential interactions or side effects in detail. The patient's questions were answered. She  understands the above and has no further questions. Further workup and treatment should be done if symptoms persist, worsen or new symptoms occur. She  will call to notify us of any problems, complications or worsening symptoms. Follow-up and Dispositions    Return in about 3 months (around 2/11/2023). There are no Patient Instructions on file for this visit. (Some details in this note may have been created with speech-recognition software. Some errors in speech recognition may have occurred.    Most of those have been corrected but some may have been missed.)         Kareem Otto MD  [address] [place] [address]  Phone [phone]

## 2022-11-11 ENCOUNTER — OFFICE VISIT (OUTPATIENT)
Dept: FAMILY MEDICINE CLINIC | Facility: CLINIC | Age: 67
End: 2022-11-11
Payer: MEDICARE

## 2022-11-11 VITALS
HEIGHT: 63 IN | OXYGEN SATURATION: 98 % | TEMPERATURE: 96.6 F | WEIGHT: 177 LBS | HEART RATE: 72 BPM | DIASTOLIC BLOOD PRESSURE: 88 MMHG | SYSTOLIC BLOOD PRESSURE: 151 MMHG | BODY MASS INDEX: 31.36 KG/M2 | RESPIRATION RATE: 16 BRPM

## 2022-11-11 DIAGNOSIS — Z23 ENCOUNTER FOR IMMUNIZATION: ICD-10-CM

## 2022-11-11 DIAGNOSIS — I10 PRIMARY HYPERTENSION: ICD-10-CM

## 2022-11-11 DIAGNOSIS — E78.5 DYSLIPIDEMIA: Primary | ICD-10-CM

## 2022-11-11 LAB
CHOLEST SERPL-MCNC: 128 MG/DL
HDLC SERPL-MCNC: 47 MG/DL (ref 40–60)
HDLC SERPL: 2.7 {RATIO}
LDLC SERPL CALC-MCNC: 57.8 MG/DL
TRIGL SERPL-MCNC: 116 MG/DL (ref 35–150)
VLDLC SERPL CALC-MCNC: 23.2 MG/DL (ref 6–23)

## 2022-11-11 PROCEDURE — G8427 DOCREV CUR MEDS BY ELIG CLIN: HCPCS | Performed by: FAMILY MEDICINE

## 2022-11-11 PROCEDURE — G0008 ADMIN INFLUENZA VIRUS VAC: HCPCS | Performed by: FAMILY MEDICINE

## 2022-11-11 PROCEDURE — 1123F ACP DISCUSS/DSCN MKR DOCD: CPT | Performed by: FAMILY MEDICINE

## 2022-11-11 PROCEDURE — 1036F TOBACCO NON-USER: CPT | Performed by: FAMILY MEDICINE

## 2022-11-11 PROCEDURE — 3017F COLORECTAL CA SCREEN DOC REV: CPT | Performed by: FAMILY MEDICINE

## 2022-11-11 PROCEDURE — 1090F PRES/ABSN URINE INCON ASSESS: CPT | Performed by: FAMILY MEDICINE

## 2022-11-11 PROCEDURE — G8417 CALC BMI ABV UP PARAM F/U: HCPCS | Performed by: FAMILY MEDICINE

## 2022-11-11 PROCEDURE — 3075F SYST BP GE 130 - 139MM HG: CPT | Performed by: FAMILY MEDICINE

## 2022-11-11 PROCEDURE — 90694 VACC AIIV4 NO PRSRV 0.5ML IM: CPT | Performed by: FAMILY MEDICINE

## 2022-11-11 PROCEDURE — G8484 FLU IMMUNIZE NO ADMIN: HCPCS | Performed by: FAMILY MEDICINE

## 2022-11-11 PROCEDURE — 3079F DIAST BP 80-89 MM HG: CPT | Performed by: FAMILY MEDICINE

## 2022-11-11 PROCEDURE — G8399 PT W/DXA RESULTS DOCUMENT: HCPCS | Performed by: FAMILY MEDICINE

## 2022-11-11 PROCEDURE — 99214 OFFICE O/P EST MOD 30 MIN: CPT | Performed by: FAMILY MEDICINE

## 2022-11-11 RX ORDER — LISINOPRIL 10 MG/1
10 TABLET ORAL DAILY
Qty: 90 TABLET | Refills: 1 | Status: SHIPPED | OUTPATIENT
Start: 2022-11-11

## 2022-11-11 ASSESSMENT — PATIENT HEALTH QUESTIONNAIRE - PHQ9
SUM OF ALL RESPONSES TO PHQ QUESTIONS 1-9: 0
SUM OF ALL RESPONSES TO PHQ QUESTIONS 1-9: 0
1. LITTLE INTEREST OR PLEASURE IN DOING THINGS: 0
SUM OF ALL RESPONSES TO PHQ9 QUESTIONS 1 & 2: 0
SUM OF ALL RESPONSES TO PHQ QUESTIONS 1-9: 0
SUM OF ALL RESPONSES TO PHQ QUESTIONS 1-9: 0
2. FEELING DOWN, DEPRESSED OR HOPELESS: 0

## 2022-11-11 ASSESSMENT — ENCOUNTER SYMPTOMS
CONSTIPATION: 0
COUGH: 0
RHINORRHEA: 0
WHEEZING: 0
DIARRHEA: 0
ABDOMINAL PAIN: 0
NAUSEA: 0
SHORTNESS OF BREATH: 0
VOMITING: 0

## 2022-11-16 ENCOUNTER — TELEPHONE (OUTPATIENT)
Dept: CASE MANAGEMENT | Age: 67
End: 2022-11-16

## 2022-11-16 DIAGNOSIS — C50.811 MALIGNANT NEOPLASM OF OVERLAPPING SITES OF RIGHT BREAST IN FEMALE, ESTROGEN RECEPTOR POSITIVE (HCC): Primary | ICD-10-CM

## 2022-11-16 DIAGNOSIS — Z17.0 MALIGNANT NEOPLASM OF OVERLAPPING SITES OF RIGHT BREAST IN FEMALE, ESTROGEN RECEPTOR POSITIVE (HCC): Primary | ICD-10-CM

## 2022-11-16 NOTE — TELEPHONE ENCOUNTER
Call to patient , discussed follow up with Dr. Rolando Madden after surgery. Reviewed day of surgery times as tentative and also sent DME script to [facility] per pts request for fitting for camisole and prosthesis. Encouraged to call for any questions/concerns.

## 2022-12-19 ENCOUNTER — ANESTHESIA EVENT (OUTPATIENT)
Dept: SURGERY | Age: 67
End: 2022-12-19
Payer: MEDICARE

## 2022-12-19 ENCOUNTER — CLINICAL DOCUMENTATION (OUTPATIENT)
Dept: SURGERY | Age: 67
End: 2022-12-19

## 2022-12-19 NOTE — PROGRESS NOTES
Faxed the most recent chart note and pathology report to [facility]  [place] per the patient's request for breast care products following the mastectomy.    Fax number: [phone]

## 2022-12-19 NOTE — PERIOP NOTE
Patient verified name and . Order for consent not found in EHR   Type 1b surgery, PAT phone assessment complete. Orders not received. Labs per surgeon: None  Labs per anesthesia protocol: None    Patient answered medical/surgical history questions at their best of ability. All prior to admission medications documented in [facility]. Patient instructed to take the following medications the day of surgery according to anesthesia guidelines with a small sip of water: None On the day before surgery please take Acetaminophen 1000mg in the morning and then again before bed. You may substitute for Tylenol 650 mg. Hold all vitamins 7 days prior to surgery and NSAIDS 5 days prior to surgery. Prescription meds to hold:None  Patient instructed on the following:    > Arrive at A Entrance, time of arrival to be called the day before by 1700  > NPO after midnight, unless otherwise indicated, including gum, mints, and ice chips  > Responsible adult must drive patient to the hospital, stay during surgery, and patient will need supervision 24 hours after anesthesia  > Use antibacterial soap in shower the night before surgery and on the morning of surgery  > All piercings must be removed prior to arrival.    > Leave all valuables (money and jewelry) at home but bring insurance card and ID on DOS.   > You may be required to pay a deductible or co-pay on the day of your procedure. You can pre-pay by calling [phone] if your surgery is at the [facility] or [phone] if your surgery is at the [facility]. > Do not wear make-up, nail polish, lotions, cologne, perfumes, powders, or oil on skin. Artificial nails are not permitted.

## 2022-12-20 ENCOUNTER — ANESTHESIA (OUTPATIENT)
Dept: SURGERY | Age: 67
End: 2022-12-20
Payer: MEDICARE

## 2022-12-20 ENCOUNTER — HOSPITAL ENCOUNTER (OUTPATIENT)
Age: 67
Setting detail: OBSERVATION
LOS: 1 days | Discharge: HOME | End: 2022-12-21
Attending: SURGERY | Admitting: SURGERY
Payer: MEDICARE

## 2022-12-20 ENCOUNTER — HOSPITAL ENCOUNTER (OUTPATIENT)
Dept: NUCLEAR MEDICINE | Age: 67
Discharge: HOME | End: 2022-12-23
Payer: MEDICARE

## 2022-12-20 DIAGNOSIS — C50.811 MALIGNANT NEOPLASM OF OVERLAPPING SITES OF RIGHT BREAST IN FEMALE, ESTROGEN RECEPTOR POSITIVE (HCC): ICD-10-CM

## 2022-12-20 DIAGNOSIS — Z17.0 MALIGNANT NEOPLASM OF OVERLAPPING SITES OF RIGHT BREAST IN FEMALE, ESTROGEN RECEPTOR POSITIVE (HCC): ICD-10-CM

## 2022-12-20 DIAGNOSIS — C50.919 MALIGNANT NEOPLASM OF FEMALE BREAST, UNSPECIFIED ESTROGEN RECEPTOR STATUS, UNSPECIFIED LATERALITY, UNSPECIFIED SITE OF BREAST (HCC): Primary | ICD-10-CM

## 2022-12-20 PROBLEM — D05.11 DUCTAL CARCINOMA IN SITU OF RIGHT BREAST: Status: ACTIVE | Noted: 2022-12-20

## 2022-12-20 PROCEDURE — G0378 HOSPITAL OBSERVATION PER HR: HCPCS

## 2022-12-20 PROCEDURE — 2580000003 HC RX 258: Performed by: ANESTHESIOLOGY

## 2022-12-20 PROCEDURE — 6370000000 HC RX 637 (ALT 250 FOR IP): Performed by: ANESTHESIOLOGY

## 2022-12-20 PROCEDURE — 2500000003 HC RX 250 WO HCPCS: Performed by: NURSE ANESTHETIST, CERTIFIED REGISTERED

## 2022-12-20 PROCEDURE — 2580000003 HC RX 258: Performed by: SURGERY

## 2022-12-20 PROCEDURE — 7100000000 HC PACU RECOVERY - FIRST 15 MIN: Performed by: SURGERY

## 2022-12-20 PROCEDURE — 7100000001 HC PACU RECOVERY - ADDTL 15 MIN: Performed by: SURGERY

## 2022-12-20 PROCEDURE — 6360000002 HC RX W HCPCS: Performed by: NURSE ANESTHETIST, CERTIFIED REGISTERED

## 2022-12-20 PROCEDURE — 6360000002 HC RX W HCPCS: Performed by: ANESTHESIOLOGY

## 2022-12-20 PROCEDURE — 3600000004 HC SURGERY LEVEL 4 BASE: Performed by: SURGERY

## 2022-12-20 PROCEDURE — 78195 LYMPH SYSTEM IMAGING: CPT

## 2022-12-20 PROCEDURE — 3700000001 HC ADD 15 MINUTES (ANESTHESIA): Performed by: SURGERY

## 2022-12-20 PROCEDURE — 88307 TISSUE EXAM BY PATHOLOGIST: CPT

## 2022-12-20 PROCEDURE — 3700000000 HC ANESTHESIA ATTENDED CARE: Performed by: SURGERY

## 2022-12-20 PROCEDURE — 3600000014 HC SURGERY LEVEL 4 ADDTL 15MIN: Performed by: SURGERY

## 2022-12-20 PROCEDURE — A4216 STERILE WATER/SALINE, 10 ML: HCPCS | Performed by: SURGERY

## 2022-12-20 PROCEDURE — 3430000000 HC RX DIAGNOSTIC RADIOPHARMACEUTICAL: Performed by: SURGERY

## 2022-12-20 PROCEDURE — 38900 IO MAP OF SENT LYMPH NODE: CPT | Performed by: SURGERY

## 2022-12-20 PROCEDURE — 38525 BIOPSY/REMOVAL LYMPH NODES: CPT | Performed by: SURGERY

## 2022-12-20 PROCEDURE — 2709999900 HC NON-CHARGEABLE SUPPLY: Performed by: SURGERY

## 2022-12-20 PROCEDURE — 2500000003 HC RX 250 WO HCPCS: Performed by: SURGERY

## 2022-12-20 PROCEDURE — 6370000000 HC RX 637 (ALT 250 FOR IP): Performed by: SURGERY

## 2022-12-20 PROCEDURE — 6360000002 HC RX W HCPCS: Performed by: SURGERY

## 2022-12-20 PROCEDURE — A9541 TC99M SULFUR COLLOID: HCPCS | Performed by: SURGERY

## 2022-12-20 PROCEDURE — 88309 TISSUE EXAM BY PATHOLOGIST: CPT

## 2022-12-20 PROCEDURE — 19303 MAST SIMPLE COMPLETE: CPT | Performed by: SURGERY

## 2022-12-20 RX ORDER — DIPHENHYDRAMINE HYDROCHLORIDE 50 MG/ML
12.5 INJECTION INTRAMUSCULAR; INTRAVENOUS
Status: DISCONTINUED | OUTPATIENT
Start: 2022-12-20 | End: 2022-12-20 | Stop reason: HOSPADM

## 2022-12-20 RX ORDER — LIDOCAINE HYDROCHLORIDE 20 MG/ML
INJECTION, SOLUTION EPIDURAL; INFILTRATION; INTRACAUDAL; PERINEURAL PRN
Status: DISCONTINUED | OUTPATIENT
Start: 2022-12-20 | End: 2022-12-20 | Stop reason: SDUPTHER

## 2022-12-20 RX ORDER — SODIUM CHLORIDE 9 MG/ML
INJECTION, SOLUTION INTRAVENOUS CONTINUOUS
Status: DISCONTINUED | OUTPATIENT
Start: 2022-12-20 | End: 2022-12-21 | Stop reason: HOSPADM

## 2022-12-20 RX ORDER — SODIUM CHLORIDE 0.9 % (FLUSH) 0.9 %
5-40 SYRINGE (ML) INJECTION PRN
Status: DISCONTINUED | OUTPATIENT
Start: 2022-12-20 | End: 2022-12-20 | Stop reason: HOSPADM

## 2022-12-20 RX ORDER — HYDROMORPHONE HYDROCHLORIDE 1 MG/ML
0.5 INJECTION, SOLUTION INTRAMUSCULAR; INTRAVENOUS; SUBCUTANEOUS EVERY 10 MIN PRN
Status: DISCONTINUED | OUTPATIENT
Start: 2022-12-20 | End: 2022-12-20 | Stop reason: HOSPADM

## 2022-12-20 RX ORDER — METHYLENE BLUE 10 MG/ML
INJECTION INTRAVENOUS PRN
Status: DISCONTINUED | OUTPATIENT
Start: 2022-12-20 | End: 2022-12-20 | Stop reason: HOSPADM

## 2022-12-20 RX ORDER — PREGABALIN 75 MG/1
75 CAPSULE ORAL 2 TIMES DAILY
Status: DISCONTINUED | OUTPATIENT
Start: 2022-12-20 | End: 2022-12-21 | Stop reason: HOSPADM

## 2022-12-20 RX ORDER — SODIUM CHLORIDE 0.9 % (FLUSH) 0.9 %
5-40 SYRINGE (ML) INJECTION EVERY 12 HOURS SCHEDULED
Status: DISCONTINUED | OUTPATIENT
Start: 2022-12-20 | End: 2022-12-20 | Stop reason: SDUPTHER

## 2022-12-20 RX ORDER — LISINOPRIL 5 MG/1
10 TABLET ORAL NIGHTLY
Status: DISCONTINUED | OUTPATIENT
Start: 2022-12-20 | End: 2022-12-21 | Stop reason: HOSPADM

## 2022-12-20 RX ORDER — OXYCODONE HYDROCHLORIDE 5 MG/1
10 TABLET ORAL PRN
Status: DISCONTINUED | OUTPATIENT
Start: 2022-12-20 | End: 2022-12-20 | Stop reason: HOSPADM

## 2022-12-20 RX ORDER — FENTANYL CITRATE 50 UG/ML
INJECTION, SOLUTION INTRAMUSCULAR; INTRAVENOUS PRN
Status: DISCONTINUED | OUTPATIENT
Start: 2022-12-20 | End: 2022-12-20 | Stop reason: SDUPTHER

## 2022-12-20 RX ORDER — ACETAMINOPHEN 500 MG
1000 TABLET ORAL EVERY 6 HOURS PRN
COMMUNITY

## 2022-12-20 RX ORDER — HYDROMORPHONE HYDROCHLORIDE 1 MG/ML
0.25 INJECTION, SOLUTION INTRAMUSCULAR; INTRAVENOUS; SUBCUTANEOUS EVERY 5 MIN PRN
Status: DISCONTINUED | OUTPATIENT
Start: 2022-12-20 | End: 2022-12-20 | Stop reason: HOSPADM

## 2022-12-20 RX ORDER — ZOLPIDEM TARTRATE 5 MG/1
5 TABLET ORAL NIGHTLY PRN
Status: DISCONTINUED | OUTPATIENT
Start: 2022-12-20 | End: 2022-12-21 | Stop reason: HOSPADM

## 2022-12-20 RX ORDER — FENTANYL CITRATE 50 UG/ML
100 INJECTION, SOLUTION INTRAMUSCULAR; INTRAVENOUS
Status: DISCONTINUED | OUTPATIENT
Start: 2022-12-20 | End: 2022-12-20 | Stop reason: HOSPADM

## 2022-12-20 RX ORDER — GLYCOPYRROLATE 0.2 MG/ML
INJECTION INTRAMUSCULAR; INTRAVENOUS PRN
Status: DISCONTINUED | OUTPATIENT
Start: 2022-12-20 | End: 2022-12-20 | Stop reason: SDUPTHER

## 2022-12-20 RX ORDER — OXYCODONE HYDROCHLORIDE 5 MG/1
5 TABLET ORAL EVERY 4 HOURS PRN
Status: DISCONTINUED | OUTPATIENT
Start: 2022-12-20 | End: 2022-12-21 | Stop reason: HOSPADM

## 2022-12-20 RX ORDER — ROCURONIUM BROMIDE 10 MG/ML
INJECTION, SOLUTION INTRAVENOUS PRN
Status: DISCONTINUED | OUTPATIENT
Start: 2022-12-20 | End: 2022-12-20 | Stop reason: SDUPTHER

## 2022-12-20 RX ORDER — SODIUM CHLORIDE 9 MG/ML
INJECTION, SOLUTION INTRAVENOUS PRN
Status: DISCONTINUED | OUTPATIENT
Start: 2022-12-20 | End: 2022-12-20 | Stop reason: HOSPADM

## 2022-12-20 RX ORDER — BUPIVACAINE HYDROCHLORIDE 5 MG/ML
INJECTION, SOLUTION EPIDURAL; INTRACAUDAL PRN
Status: DISCONTINUED | OUTPATIENT
Start: 2022-12-20 | End: 2022-12-20 | Stop reason: HOSPADM

## 2022-12-20 RX ORDER — ONDANSETRON 2 MG/ML
4 INJECTION INTRAMUSCULAR; INTRAVENOUS EVERY 6 HOURS PRN
Status: DISCONTINUED | OUTPATIENT
Start: 2022-12-20 | End: 2022-12-21 | Stop reason: HOSPADM

## 2022-12-20 RX ORDER — SODIUM CHLORIDE, SODIUM LACTATE, POTASSIUM CHLORIDE, CALCIUM CHLORIDE 600; 310; 30; 20 MG/100ML; MG/100ML; MG/100ML; MG/100ML
INJECTION, SOLUTION INTRAVENOUS CONTINUOUS
Status: DISCONTINUED | OUTPATIENT
Start: 2022-12-20 | End: 2022-12-20 | Stop reason: HOSPADM

## 2022-12-20 RX ORDER — SODIUM CHLORIDE 0.9 % (FLUSH) 0.9 %
5-40 SYRINGE (ML) INJECTION EVERY 12 HOURS SCHEDULED
Status: DISCONTINUED | OUTPATIENT
Start: 2022-12-20 | End: 2022-12-20 | Stop reason: HOSPADM

## 2022-12-20 RX ORDER — MIDAZOLAM HYDROCHLORIDE 2 MG/2ML
2 INJECTION, SOLUTION INTRAMUSCULAR; INTRAVENOUS
Status: COMPLETED | OUTPATIENT
Start: 2022-12-20 | End: 2022-12-20

## 2022-12-20 RX ORDER — SODIUM CHLORIDE, SODIUM LACTATE, POTASSIUM CHLORIDE, CALCIUM CHLORIDE 600; 310; 30; 20 MG/100ML; MG/100ML; MG/100ML; MG/100ML
INJECTION, SOLUTION INTRAVENOUS CONTINUOUS
Status: DISCONTINUED | OUTPATIENT
Start: 2022-12-20 | End: 2022-12-20

## 2022-12-20 RX ORDER — EPHEDRINE SULFATE/0.9% NACL/PF 50 MG/5 ML
SYRINGE (ML) INTRAVENOUS PRN
Status: DISCONTINUED | OUTPATIENT
Start: 2022-12-20 | End: 2022-12-20 | Stop reason: SDUPTHER

## 2022-12-20 RX ORDER — ONDANSETRON 2 MG/ML
4 INJECTION INTRAMUSCULAR; INTRAVENOUS
Status: DISCONTINUED | OUTPATIENT
Start: 2022-12-20 | End: 2022-12-20 | Stop reason: HOSPADM

## 2022-12-20 RX ORDER — ACETAMINOPHEN 500 MG
1000 TABLET ORAL ONCE
Status: COMPLETED | OUTPATIENT
Start: 2022-12-20 | End: 2022-12-20

## 2022-12-20 RX ORDER — SODIUM CHLORIDE 0.9 % (FLUSH) 0.9 %
5-40 SYRINGE (ML) INJECTION PRN
Status: DISCONTINUED | OUTPATIENT
Start: 2022-12-20 | End: 2022-12-20 | Stop reason: SDUPTHER

## 2022-12-20 RX ORDER — PROPOFOL 10 MG/ML
INJECTION, EMULSION INTRAVENOUS PRN
Status: DISCONTINUED | OUTPATIENT
Start: 2022-12-20 | End: 2022-12-20 | Stop reason: SDUPTHER

## 2022-12-20 RX ORDER — ROSUVASTATIN CALCIUM 20 MG/1
20 TABLET, COATED ORAL NIGHTLY
Status: DISCONTINUED | OUTPATIENT
Start: 2022-12-20 | End: 2022-12-21 | Stop reason: HOSPADM

## 2022-12-20 RX ORDER — NEOSTIGMINE METHYLSULFATE 1 MG/ML
INJECTION, SOLUTION INTRAVENOUS PRN
Status: DISCONTINUED | OUTPATIENT
Start: 2022-12-20 | End: 2022-12-20 | Stop reason: SDUPTHER

## 2022-12-20 RX ORDER — OXYCODONE HYDROCHLORIDE 5 MG/1
5 TABLET ORAL PRN
Status: DISCONTINUED | OUTPATIENT
Start: 2022-12-20 | End: 2022-12-20 | Stop reason: HOSPADM

## 2022-12-20 RX ORDER — DEXAMETHASONE SODIUM PHOSPHATE 10 MG/ML
INJECTION INTRAMUSCULAR; INTRAVENOUS PRN
Status: DISCONTINUED | OUTPATIENT
Start: 2022-12-20 | End: 2022-12-20 | Stop reason: SDUPTHER

## 2022-12-20 RX ORDER — ACETAMINOPHEN 325 MG/1
650 TABLET ORAL EVERY 4 HOURS PRN
Status: DISCONTINUED | OUTPATIENT
Start: 2022-12-20 | End: 2022-12-21 | Stop reason: HOSPADM

## 2022-12-20 RX ORDER — OXYCODONE HYDROCHLORIDE 5 MG/1
10 TABLET ORAL EVERY 4 HOURS PRN
Status: DISCONTINUED | OUTPATIENT
Start: 2022-12-20 | End: 2022-12-21 | Stop reason: HOSPADM

## 2022-12-20 RX ORDER — SODIUM CHLORIDE 9 MG/ML
INJECTION INTRAVENOUS PRN
Status: DISCONTINUED | OUTPATIENT
Start: 2022-12-20 | End: 2022-12-20 | Stop reason: HOSPADM

## 2022-12-20 RX ORDER — MORPHINE SULFATE 2 MG/ML
2 INJECTION, SOLUTION INTRAMUSCULAR; INTRAVENOUS EVERY 4 HOURS PRN
Status: DISCONTINUED | OUTPATIENT
Start: 2022-12-20 | End: 2022-12-21 | Stop reason: HOSPADM

## 2022-12-20 RX ORDER — ONDANSETRON 2 MG/ML
INJECTION INTRAMUSCULAR; INTRAVENOUS PRN
Status: DISCONTINUED | OUTPATIENT
Start: 2022-12-20 | End: 2022-12-20 | Stop reason: SDUPTHER

## 2022-12-20 RX ADMIN — SODIUM CHLORIDE, SODIUM LACTATE, POTASSIUM CHLORIDE, AND CALCIUM CHLORIDE: 600; 310; 30; 20 INJECTION, SOLUTION INTRAVENOUS at 13:04

## 2022-12-20 RX ADMIN — GLYCOPYRROLATE 0.4 MG: 0.2 INJECTION, SOLUTION INTRAMUSCULAR; INTRAVENOUS at 14:45

## 2022-12-20 RX ADMIN — DEXAMETHASONE SODIUM PHOSPHATE 5 MG: 10 INJECTION INTRAMUSCULAR; INTRAVENOUS at 13:46

## 2022-12-20 RX ADMIN — ROSUVASTATIN CALCIUM 20 MG: 20 TABLET, COATED ORAL at 19:34

## 2022-12-20 RX ADMIN — ONDANSETRON 4 MG: 2 INJECTION INTRAMUSCULAR; INTRAVENOUS at 13:46

## 2022-12-20 RX ADMIN — LIDOCAINE HYDROCHLORIDE 100 MG: 20 INJECTION, SOLUTION EPIDURAL; INFILTRATION; INTRACAUDAL; PERINEURAL at 13:10

## 2022-12-20 RX ADMIN — SODIUM CHLORIDE, SODIUM LACTATE, POTASSIUM CHLORIDE, AND CALCIUM CHLORIDE: 600; 310; 30; 20 INJECTION, SOLUTION INTRAVENOUS at 10:20

## 2022-12-20 RX ADMIN — MORPHINE SULFATE 2 MG: 2 INJECTION, SOLUTION INTRAMUSCULAR; INTRAVENOUS at 21:56

## 2022-12-20 RX ADMIN — Medication 10 MG: at 14:37

## 2022-12-20 RX ADMIN — SODIUM CHLORIDE: 9 INJECTION, SOLUTION INTRAVENOUS at 19:02

## 2022-12-20 RX ADMIN — Medication 10 MG: at 14:01

## 2022-12-20 RX ADMIN — FENTANYL CITRATE 50 MCG: 50 INJECTION, SOLUTION INTRAMUSCULAR; INTRAVENOUS at 13:28

## 2022-12-20 RX ADMIN — Medication 268 MICRO CURIE: at 10:40

## 2022-12-20 RX ADMIN — PROPOFOL 200 MG: 10 INJECTION, EMULSION INTRAVENOUS at 13:10

## 2022-12-20 RX ADMIN — PREGABALIN 75 MG: 75 CAPSULE ORAL at 19:34

## 2022-12-20 RX ADMIN — LISINOPRIL 10 MG: 5 TABLET ORAL at 19:35

## 2022-12-20 RX ADMIN — ROCURONIUM BROMIDE 40 MG: 50 INJECTION, SOLUTION INTRAVENOUS at 13:10

## 2022-12-20 RX ADMIN — PHENYLEPHRINE HYDROCHLORIDE 50 MCG: 0.1 INJECTION, SOLUTION INTRAVENOUS at 14:05

## 2022-12-20 RX ADMIN — Medication 3 MG: at 14:45

## 2022-12-20 RX ADMIN — FENTANYL CITRATE 50 MCG: 50 INJECTION, SOLUTION INTRAMUSCULAR; INTRAVENOUS at 13:10

## 2022-12-20 RX ADMIN — PHENYLEPHRINE HYDROCHLORIDE 50 MCG: 0.1 INJECTION, SOLUTION INTRAVENOUS at 14:18

## 2022-12-20 RX ADMIN — OXYCODONE 10 MG: 5 TABLET ORAL at 19:39

## 2022-12-20 RX ADMIN — ACETAMINOPHEN 1000 MG: 500 TABLET, FILM COATED ORAL at 10:15

## 2022-12-20 RX ADMIN — ONDANSETRON 4 MG: 2 INJECTION INTRAMUSCULAR; INTRAVENOUS at 21:55

## 2022-12-20 RX ADMIN — Medication 261 MICRO CURIE: at 10:40

## 2022-12-20 RX ADMIN — MIDAZOLAM 2 MG: 1 INJECTION INTRAMUSCULAR; INTRAVENOUS at 13:01

## 2022-12-20 RX ADMIN — PHENYLEPHRINE HYDROCHLORIDE 100 MCG: 0.1 INJECTION, SOLUTION INTRAVENOUS at 13:21

## 2022-12-20 RX ADMIN — Medication 2000 MG: at 13:17

## 2022-12-20 ASSESSMENT — PAIN SCALES - GENERAL
PAINLEVEL_OUTOF10: 0
PAINLEVEL_OUTOF10: 7

## 2022-12-20 ASSESSMENT — PAIN DESCRIPTION - DESCRIPTORS
DESCRIPTORS: SORE
DESCRIPTORS: TENDER

## 2022-12-20 ASSESSMENT — PAIN DESCRIPTION - LOCATION
LOCATION: INCISION
LOCATION: INCISION

## 2022-12-20 ASSESSMENT — PAIN - FUNCTIONAL ASSESSMENT
PAIN_FUNCTIONAL_ASSESSMENT: ACTIVITIES ARE NOT PREVENTED
PAIN_FUNCTIONAL_ASSESSMENT: ACTIVITIES ARE NOT PREVENTED
PAIN_FUNCTIONAL_ASSESSMENT: 0-10

## 2022-12-20 ASSESSMENT — PAIN DESCRIPTION - ORIENTATION
ORIENTATION: RIGHT
ORIENTATION: RIGHT

## 2022-12-20 NOTE — BRIEF OP NOTE
Brief Postoperative Note      Patient: Alexandra Barton  YOB: 1955  MRN: [id number]    Date of Procedure: 12/20/2022    Pre-Op Diagnosis: Malignant neoplasm of overlapping sites of right breast in female, estrogen receptor positive ([facility] [C50.811, Z17.0]    Post-Op Diagnosis: Same       Procedure(s):  RIGHT BREAST MASTECTOMY  RIGHT SENTINEL NODE BIOPSY     Surgeon(s):  Lea Martin MD    Assistant:  * No surgical staff found *    Anesthesia: General    Estimated Blood Loss (mL): 13NM    Complications: None    Specimens:   ID Type Source Tests Collected by Time Destination   A : right breast- single stitch marks cranial , double stitch marks lateral Tissue Breast SURGICAL PATHOLOGY Lea Martin MD 12/20/2022 1409    B : right axillary sentinel nodes Tissue Axillary SURGICAL PATHOLOGY Lea Martin MD 12/20/2022 1410        Implants:  * No implants in log *      Drains:   Closed/Suction Drain Right Breast Bulb (Active)       Findings: [place] lymph nodes identified    Electronically signed by Lea Wyatt MD on 12/20/2022 at 3:06 PM

## 2022-12-20 NOTE — ANESTHESIA POSTPROCEDURE EVALUATION
Department of Anesthesiology  Postprocedure Note    Patient: Piero Oleary  MRN: [id number]  YOB: 1955  Date of evaluation: 12/20/2022      Procedure Summary     Date: 12/20/22 [place] [facility] [place] [place] [facility] [place] [facility] [place]    Anesthesia Start: 5320 Anesthesia Stop: 1502    Procedures:       RIGHT BREAST MASTECTOMY (Right: Breast)      RIGHT SENTINEL NODE BIOPSY  (Right: Axilla) Diagnosis:       Malignant neoplasm of overlapping sites of right breast in female, estrogen receptor positive ([address]      (Malignant neoplasm of overlapping sites of right breast in female, estrogen receptor positive ([facility] [address] [C50.811, Z17.0])    Surgeons: Cody Velazquez MD Responsible Provider: Stephane Mayen MD    Anesthesia Type: general ASA Status: 2          Anesthesia Type: No value filed.     Mirza Phase I: Mirza Score: 10    Mirza Phase II:        Anesthesia Post Evaluation    Patient location during evaluation: PACU  Patient participation: complete - patient participated  Level of consciousness: awake and alert  Airway patency: patent  Nausea & Vomiting: no nausea and no vomiting  Complications: no  Cardiovascular status: hemodynamically stable  Respiratory status: acceptable, nonlabored ventilation and spontaneous ventilation  Hydration status: euvolemic  Comments: BP (!) 154/75   Pulse 69   Temp 97.2 °F (36.2 °C) (Temporal)   Resp 14   Ht 5' 2.75\" (1.594 m)   Wt 175 lb 3.2 oz (79.5 kg)   SpO2 97%   BMI 31.28 kg/m²     Multimodal analgesia pain management approach

## 2022-12-20 NOTE — H&P
CHIEF COMPLAINT: Right breast cancer        PRIMARY CARE PHYSICIAN: Emily Rivera MD        HISTORY:  Underwent screening mammogram.  Last was in March 2010. Imaging showed a right breast mass and calcifications. Biopsy was done showing infiltrating ductal carcinoma, high-grade, ER/TX positive, HER2 negative. Showed the right breast mass at 9:00 measuring up to 6.4 cm by MRI and mammogram showing an estimated 8 cm of expected disease. MRI showed the dominant mass and likely satellite nodules anterior to the mass. Here for right mastectomy and SLNB. No new issues. REVIEW OF SYSTEMS:  Review of Systems   Constitutional: Negative. HENT: Negative. Eyes: Negative. Respiratory: Negative. Cardiovascular: Negative. Gastrointestinal: Negative. Endocrine: Negative. Genitourinary: Negative. Musculoskeletal: Negative. Skin: Negative. Allergic/Immunologic: Negative. Neurological: Negative. Hematological: Negative. Psychiatric/Behavioral: Negative. History reviewed. No pertinent past medical history. Current Outpatient Medications   Medication Sig Dispense Refill    Multiple Vitamins-Minerals (MULTIVITAMIN ADULTS 50+ PO) Take by mouth daily        Omega-3 Fatty Acids (FISH OIL) 1200 MG CAPS Take by mouth daily        rosuvastatin (CRESTOR) 20 MG tablet Take 1 tablet by mouth nightly 90 tablet 3    zoster recombinant adjuvanted vaccine [facility]) 50 MCG/0.5ML SUSR injection Inject 0.5 mLs into the muscle See Admin Instructions 1 dose now and repeat in 2-6 months (Patient not taking: Reported on 10/24/2022) 0.5 mL 0      No current facility-administered medications for this visit.                Family History   Problem Relation Age of Onset    Thyroid Disease Mother      Heart Disease Mother      High Blood Pressure Father      Heart Disease Father      Thyroid Disease Father      Cancer Sister 48         lung    Diabetes Neg Hx           Social History Socioeconomic History    Marital status:        Spouse name: None    Number of children: None    Years of education: None    Highest education level: None   Tobacco Use    Smoking status: Never    Smokeless tobacco: Never   Vaping Use    Vaping Use: Never used   Substance and Sexual Activity    Alcohol use: Yes    Drug use: Never      Social Determinants of Health          Financial Resource Strain: Low Risk     Difficulty of Paying Living Expenses: Not very hard   Food Insecurity: No Food Insecurity    Worried About Running Out of Food in the Last Year: Never true    Ran Out of Food in the Last Year: Never true   Transportation Needs: No Transportation Needs    Lack of Transportation (Medical): No    Lack of Transportation (Non-Medical): No   Physical Activity: Inactive    Days of Exercise per Week: 0 days    Minutes of Exercise per Session: 0 min   Stress: No Stress Concern Present    Feeling of Stress : Not at all   Social Connections: Unknown    Frequency of Communication with Friends and Family: More than three times a week    Frequency of Social Gatherings with Friends and Family: More than three times a week    Marital Status:    Intimate Partner Violence: Not At Risk    Fear of Current or Ex-Partner: No    Emotionally Abused: No    Physically Abused: No    Sexually Abused: No   Housing Stability: Unknown    Unable to Pay for Housing in the Last Year: No    Unstable Housing in the Last Year: No            PHYSICAL EXAMINATION:  Physical Exam  Constitutional:       Appearance: Normal appearance. HENT:      Head: Normocephalic and atraumatic. Nose: Nose normal.      Mouth/Throat:      Mouth: Mucous membranes are moist.   Eyes:      Extraocular Movements: Extraocular movements intact. Pupils: Pupils are equal, round, and reactive to light. Cardiovascular:      Rate and Rhythm: Normal rate and regular rhythm.    Pulmonary:      Effort: Pulmonary effort is normal.   Chest: Breasts:     Right: No inverted nipple or nipple discharge. Left: No inverted nipple, mass or nipple discharge. Comments: Fullness in the central aspect of the right breast  Abdominal:      General: There is no distension. Palpations: Abdomen is soft. Tenderness: There is no abdominal tenderness. Musculoskeletal:         General: Normal range of motion. Cervical back: Normal range of motion and neck supple. Lymphadenopathy:      Upper Body:      Right upper body: No supraclavicular or axillary adenopathy. Left upper body: No supraclavicular or axillary adenopathy. Skin:     General: Skin is warm and dry. Coloration: Skin is not jaundiced. Neurological:      General: No focal deficit present. Mental Status: She is alert and oriented to person, place, and time. Sensory: Sensation is intact. Psychiatric:         Mood and Affect: Mood normal.         Behavior: Behavior normal.         Thought Content: Thought content normal.            STUDIES:  MRI Result (most recent):  MRI BREAST BILATERAL W WO CONTRAST 10/19/2022     Narrative  MRI of the Breasts with and without contrast     CLINICAL INDICATION:  New diagnosis of right breast 9:00 infiltrating ductal  carcinoma high grade undergoing evaluation for extent of disease, staging,  therapy planning. No previous personal malignancy or breast surgery in the past  otherwise. COMPARISON: Ultrasound and mammography 10/5/2022, 9/27/2022, 9/9/2022     TECHNIQUE: Standard MRI sequences were obtained through the breasts in multiple  planes. Images were obtained before and after intravenous infusion of 17 mL of  Prohance contrast. Images were reviewed with PACS and with [employer] CAD software. FINDINGS: The breasts demonstrate moderate glandularity and mild background  enhancement.      Right breast: An 9:00 midposterior depth are biopsy changes within the irregular  heterogeneously enhancing malignant mass measuring up to 4.0 x 2.6 x 3.3 cm. Anterior to the mass (within 1.5 cm of the anterior margin) system are 2 tiny 3  mm satellite nodules. Overall the MRI area of concern measures up to 6.4 cm AP. Note that on recent mammography, malignant-type calcifications extending  anteriorly and posteriorly from the mass for total AP extent of at least 8 cm. Left breast: No evidence of suspicious enhancing mass, and no dominant or unique  nonmass enhancement, to suggest malignancy. Lymph nodes: No obvious axillary or internal mammary lymphadenopathy. Several  bilateral axillary lymph nodes are relatively symmetric in size and number, all  demonstrating preservation of expected reniform shape with fatty grant. Elsewhere colon limited inclusion of the thorax and upper abdomen shows a  partially visualized hiatal hernia which is not entirely seen or evaluated here. Impression  1. Right 9:00 breast cancer measures up to 6.4 cm by MRI (recent mammography  demonstrated at least 8 cm of expected disease). 2. No suspicious left breast finding. 3. No obvious lymphadenopathy. Recommend continued malignancy management as directed clinically. BI-RADS Assessment Category 6: Known Biopsy Proven Malignancy     IMPRESSION:  Large breast cancer likely satellite lesions, infiltrating ductal carcinoma, ER/MS positive HER2 negative. Long discussion today about breast cancer and treatments including lumpectomy, mastectomy, SLNB/ALND, chemotherapy, radiation. She met with the medical oncologist.  The discussion revolved around possible neoadjuvant treatment to reduce the size of the lesion followed by surgical treatment. The alternative would be upfront surgical treatment with a mastectomy and sentinel node biopsy followed by additional appropriate treatments based on the pathology. I reviewed the imaging results with her.   Based on the size of the lesion along with associated satellite lesions she is more likely a candidate for mastectomy with or without reconstruction along with a sentinel node biopsy. This would allow us there all of the tumor from the breast along with final pathology results on which to base additional treatment recommendations. She has elected for a right total mastectomy and sentinel node biopsy. She defers reconstruction. Reviewed surgery and risks including bleeding, infection, damage to nerves/vessels, edema, scarring, recurrence, need for additional procedures. ASSESSMENT/PLAN:  Newly diagnosed extensive right breast cancer. She will undergo right total mastectomy and right axillary sentinel node biopsy. Following this she will reevaluate additional treatment options with medical oncology.

## 2022-12-20 NOTE — ANESTHESIA PRE PROCEDURE
Department of Anesthesiology  Preprocedure Note       Name:  Piero Oleary   Age:  75 y.o.  :  1955                                          MRN:  [id number]         Date:  2022      Surgeon: Kat Saucedo):  Cody Velazquez MD    Procedure: Procedure(s):  RIGHT BREAST MASTECTOMY  RIGHT SENTINEL NODE BIOPSY Pre-Op: 9:00, Lympho: 10:30, Loc: n/a, Surgery: 12:30    Medications prior to admission:   Prior to Admission medications    Medication Sig Start Date End Date Taking?  Authorizing Provider   acetaminophen (TYLENOL) 500 MG tablet Take 1,000 mg by mouth every 6 hours as needed for Pain   Yes Historical Provider, MD   lisinopril (PRINIVIL;ZESTRIL) 10 MG tablet Take 1 tablet by mouth daily  Patient taking differently: Take 10 mg by mouth at bedtime 22   Abril Camacho MD   Multiple Vitamins-Minerals (MULTIVITAMIN ADULTS 50+ PO) Take by mouth at bedtime    Historical Provider, MD   Omega-3 Fatty Acids (FISH OIL) 1200 MG CAPS Take by mouth at bedtime    Historical Provider, MD   rosuvastatin (CRESTOR) 20 MG tablet Take 1 tablet by mouth nightly 22   Abril Camacho MD       Current medications:    Current Facility-Administered Medications   Medication Dose Route Frequency Provider Last Rate Last Admin    0.9 % sodium chloride infusion   IntraVENous PRN Cody Velazquez MD        fentaNYL (SUBLIMAZE) injection 100 mcg  100 mcg IntraVENous Once PRN Janes Maya MD        lactated ringers infusion   IntraVENous Continuous Janes Maya  mL/hr at 22 1020 New Bag at 22 1020    sodium chloride flush 0.9 % injection 5-40 mL  5-40 mL IntraVENous 2 times per day Janes Maya MD        sodium chloride flush 0.9 % injection 5-40 mL  5-40 mL IntraVENous PRN Janes Maya MD        midazolam PF (VERSED) injection 2 mg  2 mg IntraVENous Once PRN Janes Maya MD        ceFAZolin (ANCEF) 2000 mg in sterile water 20 mL IV syringe  2,000 mg IntraVENous Once Miriam Garcia MD           Allergies:  No Known Allergies    Problem List:    Patient Active Problem List   Diagnosis Code    Elevated liver function tests R79.89    Dyslipidemia E78.5    Weight gain R63.5    Renal insufficiency N28.9    Malignant neoplasm of overlapping sites of right breast in female, estrogen receptor positive ([facility] C50.811, Z17.0    Primary hypertension I10    Ductal carcinoma in situ of right breast D05.11       Past Medical History:        Diagnosis Date    Cat scratch fever 1973    Elevated liver function tests     Hyperlipidemia     managed with medication    Hypertension     managed with medication    Malignant neoplasm of overlapping sites of right breast in female, estrogen receptor positive ([facility] 2022    Renal insufficiency        Past Surgical History:        Procedure Laterality Date    OTHER SURGICAL HISTORY      right lymph node excision - neck    US BREAST NEEDLE BIOPSY RIGHT Right 10/05/2022    US BREAST NEEDLE BIOPSY RIGHT 10/5/2022 Bert Blount MD [facility]       Social History:    Social History     Tobacco Use    Smoking status: Never    Smokeless tobacco: Never   Substance Use Topics    Alcohol use: Not Currently     Comment: Socially                                Counseling given: Not Answered      Vital Signs (Current):   Vitals:    12/19/22 0756 12/20/22 0954 12/20/22 1023   BP:  (!) 147/88    Pulse:  75    Resp:   16   Temp:   97.9 °F (36.6 °C)   TempSrc:   Tympanic   SpO2:   98%   Weight: 170 lb (77.1 kg)  175 lb 3.2 oz (79.5 kg)   Height: 5' 2.75\" (1.594 m)                                                BP Readings from Last 3 Encounters:   12/20/22 (!) 147/88   11/11/22 (!) 151/88   10/24/22 (!) 142/84       NPO Status: Time of last liquid consumption: 0600                        Time of last solid consumption: 2300                        Date of last liquid consumption: 12/20/22                        Date of last solid food consumption: 12/19/22    BMI:   Wt Readings from Last 3 Encounters:   12/20/22 175 lb 3.2 oz (79.5 kg)   11/11/22 177 lb (80.3 kg)   10/24/22 176 lb 4.8 oz (80 kg)     Body mass index is 31.28 kg/m². CBC:   Lab Results   Component Value Date/Time    WBC 5.6 07/27/2022 12:40 PM    RBC 5.42 07/27/2022 12:40 PM    HGB 15.3 07/27/2022 12:40 PM    HCT 48.0 07/27/2022 12:40 PM    MCV 88.6 07/27/2022 12:40 PM    RDW 13.3 07/27/2022 12:40 PM     07/27/2022 12:40 PM       CMP:   Lab Results   Component Value Date/Time     07/27/2022 12:40 PM    K 4.6 07/27/2022 12:40 PM     07/27/2022 12:40 PM    CO2 27 07/27/2022 12:40 PM    BUN 13 07/27/2022 12:40 PM    CREATININE 1.00 07/27/2022 12:40 PM    GFRAA >60 07/27/2022 12:40 PM    AGRATIO 1.8 07/01/2021 12:26 PM    LABGLOM 59 07/27/2022 12:40 PM    GLUCOSE 87 07/27/2022 12:40 PM    PROT 7.1 07/27/2022 12:40 PM    CALCIUM 9.3 07/27/2022 12:40 PM    BILITOT 0.6 07/27/2022 12:40 PM    ALKPHOS 73 07/27/2022 12:40 PM    ALKPHOS 72 07/01/2021 12:26 PM    AST 21 07/27/2022 12:40 PM    ALT 36 07/27/2022 12:40 PM       POC Tests: No results for input(s): POCGLU, POCNA, POCK, POCCL, POCBUN, POCHEMO, POCHCT in the last 72 hours.     Coags: No results found for: PROTIME, INR, APTT    HCG (If Applicable): No results found for: PREGTESTUR, PREGSERUM, HCG, HCGQUANT     ABGs: No results found for: PHART, PO2ART, NHZ2QQS, YMQ8MEG, BEART, I3WPXSOH     Type & Screen (If Applicable):  No results found for: LABABO, LABRH    Drug/Infectious Status (If Applicable):  No results found for: HIV, HEPCAB    COVID-19 Screening (If Applicable): No results found for: COVID19        Anesthesia Evaluation  Patient summary reviewed and Nursing notes reviewed no history of anesthetic complications:   Airway: Mallampati: I  TM distance: >3 FB   Neck ROM: full  Mouth opening: > = 3 FB   Dental:    (+) caps and poor dentition      Pulmonary:Negative Pulmonary ROS breath sounds clear to auscultation                             Cardiovascular:  Exercise tolerance: good (>4 METS),   (+) hyperlipidemia        Rhythm: regular  Rate: normal                    Neuro/Psych:   Negative Neuro/Psych ROS              GI/Hepatic/Renal:   (+) renal disease: CRI,      Morbid obesity: obese. Endo/Other:                     Abdominal:             Vascular: negative vascular ROS. Other Findings:           Anesthesia Plan      general     ASA 2       Induction: intravenous. Anesthetic plan and risks discussed with patient and spouse.                         Burgess Declan MD   12/20/2022

## 2022-12-20 NOTE — OP NOTE
Operative Note      Patient: Davina Rivas  YOB: 1955  MRN: [id number]    Date of Procedure: 12/20/2022    Pre-Op Diagnosis: Malignant neoplasm of overlapping sites of right breast in female, estrogen receptor positive ([facility] [C50.811, Z17.0]    Post-Op Diagnosis: Same       Procedure(s):  RIGHT BREAST MASTECTOMY  RIGHT SENTINEL NODE BIOPSY     Surgeon(s):  Anastacio Schultz MD    Assistant:   * No surgical staff found *    Anesthesia: General    Estimated Blood Loss (mL): 98EA    Complications: None    Specimens:   ID Type Source Tests Collected by Time Destination   A : right breast- single stitch marks cranial , double stitch marks lateral Tissue Breast SURGICAL PATHOLOGY Anastacio Schultz MD 12/20/2022 1409    B : right axillary sentinel nodes Tissue Axillary SURGICAL PATHOLOGY Anastacio Schultz MD 12/20/2022 1410        Implants:  * No implants in log *      Drains:   Closed/Suction Drain Right Breast Bulb (Active)       Findings: [place] lymph nodes identified    Detailed Description of Procedure:   Informed consent obtained. Patient was marked in the holding area. She is undergone radiotracer injection for sentinel lymph node mapping and identification. She was taken to the operating room and positioned appropriately on the operating table. She underwent general anesthesia without complications. The left breast and left axilla were prepped and draped in a sterile fashion. The appropriate timeout was performed. Ancef was given as prophylaxis prior to incision. A site for incision was marked in an elliptical fashion on the right breast around the areolar complex. The right subareolar location was then infiltrated with half-strength methylene blue and 5 minutes of gentle breast massage was then performed. The skin was incised along the previously placed marks with a scalpel and dissection was carried into the deeper tissues with cautery.   A mastectomy was performed dissecting the breast off of the overlying skin and subcutaneous tissue circumferentially. The dissection was carried to the sternum medially the clavicle superiorly the inframammary fold inferiorly and the latissimus laterally. The breast was then dissected off the pectoralis muscle including removing the pectoralis fascia from superior medial to lateral.  The entire breast and axillary tail were removed and the axillary tail was transected from the axillary contents. The specimen was marked for pathologic orientation and then sent to pathology. Hemostasis was achieved at the site. Using the neoprobe the axilla was then examined. An area of increased tracer activity was identified. Dissection in this area revealed a hot blue sentinel node that was excised. Further dissection revealed an additional hot blue node that was excised in the same manner and sent to pathology. Following this there were no additional palpable nodes no additional blue nodes and no additional tracer activity in the axilla. Hemostasis was achieved at the site. Local anesthetic was then infiltrated in the subpectoral region. A 19 Swedish round drain was placed through a small stab incision inferiorly and secured to the skin with a 2-0 nylon suture. It was coiled within the mastectomy space. After ensuring hemostasis the skin was reapproximated loosely with skin staples. The dermis was then closed with interrupted 3-0 Vicryl sutures. The skin staples were removed. The skin was then closed using a double-armed 2 oh strata fix suture in a running fashion. After thoroughly cleaning the site Dermabond was applied. After this was allowed to dry a padded Ace wrap was then applied. She tolerated the procedure well without complications. Lap and instrument count at the completion of the procedure was correct x2. Estimated blood loss was 25 mL.   Patient condition at the completion was stable and she was awoken from [facility] and transported to recovery in stable condition.       Electronically signed by Jolene Rockwell MD on 12/20/2022 at 3:07 PM

## 2022-12-20 NOTE — PERIOP NOTE
TRANSFER - OUT REPORT:    Verbal report given to Marie Morrow RN on Jaelyn Hall  being transferred to  for routine post-op       Report consisted of patient's Situation, Background, Assessment and   Recommendations(SBAR). Information from the following report(s) Nurse Handoff Report and Cardiac Rhythm SR  was reviewed with the receiving nurse. [place] Assessment: No data recorded  Lines:   Peripheral IV 12/20/22 Left;Posterior Hand (Active)   Site Assessment Clean, dry & intact 12/20/22 1514   Line Status Infusing 12/20/22 1514   Phlebitis Assessment No symptoms 12/20/22 1514   Infiltration Assessment 0 12/20/22 1514   Dressing Status Clean, dry & intact 12/20/22 1514   Dressing Type Transparent 12/20/22 1514        Opportunity for questions and clarification was provided.       Patient transported with:  O2 @ room air lpm

## 2022-12-21 ENCOUNTER — TELEPHONE (OUTPATIENT)
Dept: FAMILY MEDICINE CLINIC | Facility: CLINIC | Age: 67
End: 2022-12-21

## 2022-12-21 VITALS
HEART RATE: 84 BPM | SYSTOLIC BLOOD PRESSURE: 91 MMHG | HEIGHT: 63 IN | BODY MASS INDEX: 31.04 KG/M2 | TEMPERATURE: 98.8 F | WEIGHT: 175.2 LBS | DIASTOLIC BLOOD PRESSURE: 50 MMHG | OXYGEN SATURATION: 93 % | RESPIRATION RATE: 18 BRPM

## 2022-12-21 PROCEDURE — G0378 HOSPITAL OBSERVATION PER HR: HCPCS

## 2022-12-21 PROCEDURE — 2580000003 HC RX 258: Performed by: SURGERY

## 2022-12-21 PROCEDURE — 6370000000 HC RX 637 (ALT 250 FOR IP): Performed by: SURGERY

## 2022-12-21 RX ORDER — TRAMADOL HYDROCHLORIDE 50 MG/1
50 TABLET ORAL EVERY 6 HOURS PRN
Qty: 20 TABLET | Refills: 0 | Status: SHIPPED | OUTPATIENT
Start: 2022-12-21 | End: 2022-12-26

## 2022-12-21 RX ORDER — IBUPROFEN 600 MG/1
600 TABLET ORAL 4 TIMES DAILY PRN
Qty: 1 TABLET | Refills: 0 | Status: SHIPPED | OUTPATIENT
Start: 2022-12-21 | End: 2023-01-04

## 2022-12-21 RX ORDER — ACETAMINOPHEN 325 MG/1
650 TABLET ORAL EVERY 4 HOURS PRN
Qty: 1 TABLET | Refills: 0
Start: 2022-12-21

## 2022-12-21 RX ORDER — ONDANSETRON 4 MG/1
4 TABLET, ORALLY DISINTEGRATING ORAL 3 TIMES DAILY PRN
Qty: 21 TABLET | Refills: 0 | Status: SHIPPED | OUTPATIENT
Start: 2022-12-21

## 2022-12-21 RX ADMIN — PREGABALIN 75 MG: 75 CAPSULE ORAL at 09:05

## 2022-12-21 RX ADMIN — SODIUM CHLORIDE: 9 INJECTION, SOLUTION INTRAVENOUS at 03:30

## 2022-12-21 RX ADMIN — ACETAMINOPHEN 650 MG: 325 TABLET, FILM COATED ORAL at 04:14

## 2022-12-21 ASSESSMENT — PAIN DESCRIPTION - LOCATION: LOCATION: HEAD

## 2022-12-21 ASSESSMENT — PAIN - FUNCTIONAL ASSESSMENT: PAIN_FUNCTIONAL_ASSESSMENT: ACTIVITIES ARE NOT PREVENTED

## 2022-12-21 ASSESSMENT — PAIN SCALES - GENERAL
PAINLEVEL_OUTOF10: 2
PAINLEVEL_OUTOF10: 0

## 2022-12-21 ASSESSMENT — PAIN DESCRIPTION - DESCRIPTORS: DESCRIPTORS: ACHING

## 2022-12-21 NOTE — PROGRESS NOTES
Patient seen and examined. Doing well. Pain is well controlled. Has required minimal pain medications. BP (!) 101/59   Pulse 83   Temp 98.6 °F (37 °C) (Oral)   Resp 17   Ht 5' 2.75\" (1.594 m)   Wt 175 lb 3.2 oz (79.5 kg)   SpO2 91%   BMI 31.28 kg/m²   HEENT: negative to gross visual examination  Heart: regular rate and rhythm  Lungs: Aerating well  Abdomen: soft  Ext: no clubbing, cyanosis, erythema, edema  Neuro: alert and oriented.  No gross deficits  Dressing in place at the right mastectomy site, no signs of swelling, PAT is serosanguineous  CBC:   Lab Results   Component Value Date/Time    WBC 5.6 07/27/2022 12:40 PM    RBC 5.42 07/27/2022 12:40 PM    HGB 15.3 07/27/2022 12:40 PM    HCT 48.0 07/27/2022 12:40 PM    MCV 88.6 07/27/2022 12:40 PM    MCH 28.2 07/27/2022 12:40 PM    MCHC 31.9 07/27/2022 12:40 PM    RDW 13.3 07/27/2022 12:40 PM     07/27/2022 12:40 PM    MPV 9.8 07/27/2022 12:40 PM     CMP:    Lab Results   Component Value Date/Time     07/27/2022 12:40 PM    K 4.6 07/27/2022 12:40 PM     07/27/2022 12:40 PM    CO2 27 07/27/2022 12:40 PM    BUN 13 07/27/2022 12:40 PM    CREATININE 1.00 07/27/2022 12:40 PM    GFRAA >60 07/27/2022 12:40 PM    AGRATIO 1.8 07/01/2021 12:26 PM    LABGLOM 59 07/27/2022 12:40 PM    GLUCOSE 87 07/27/2022 12:40 PM    PROT 7.1 07/27/2022 12:40 PM    LABALBU 4.1 07/27/2022 12:40 PM    CALCIUM 9.3 07/27/2022 12:40 PM    BILITOT 0.6 07/27/2022 12:40 PM    ALKPHOS 73 07/27/2022 12:40 PM    ALKPHOS 72 07/01/2021 12:26 PM    AST 21 07/27/2022 12:40 PM    ALT 36 07/27/2022 12:40 PM     BMP:    Lab Results   Component Value Date/Time     07/27/2022 12:40 PM    K 4.6 07/27/2022 12:40 PM     07/27/2022 12:40 PM    CO2 27 07/27/2022 12:40 PM    BUN 13 07/27/2022 12:40 PM    LABALBU 4.1 07/27/2022 12:40 PM    CREATININE 1.00 07/27/2022 12:40 PM    CALCIUM 9.3 07/27/2022 12:40 PM    GFRAA >60 07/27/2022 12:40 PM    LABGLOM 59 07/27/2022 12:40 PM GLUCOSE 87 07/27/2022 12:40 PM     Assessment:  Patient Active Problem List   Diagnosis    Elevated liver function tests    Dyslipidemia    Weight gain    Renal insufficiency    Malignant neoplasm of overlapping sites of right breast in female, estrogen receptor positive ([facility]    Primary hypertension    Ductal carcinoma in situ of right breast    Breast cancer in female [facility])     Plan:  Postop day 1 from a right total mastectomy and sentinel node biopsy. She is doing well. Diet as tolerated this morning. Out of bed. PAT education. Discharge home later today.   Lea Wyatt MD  8:15 AM  12/21/2022

## 2022-12-21 NOTE — TELEPHONE ENCOUNTER
She may want to try cutting it in half for a while and monitoring her blood pressure. Make sure she is drinking plenty of fluids.

## 2022-12-21 NOTE — TELEPHONE ENCOUNTER
Pt called stating that she just recently had a Mastectomy. States that her BP is staying low around 104/75. She is taking that Lisinopril. Asking if she can stop the medication and monitor her BP. Last OV 11/11/22.  Next OV 7/28/23

## 2022-12-23 ENCOUNTER — CLINICAL DOCUMENTATION (OUTPATIENT)
Dept: CASE MANAGEMENT | Age: 67
End: 2022-12-23

## 2022-12-28 ENCOUNTER — OFFICE VISIT (OUTPATIENT)
Dept: SURGERY | Age: 67
End: 2022-12-28

## 2022-12-28 DIAGNOSIS — C50.811 MALIGNANT NEOPLASM OF OVERLAPPING SITES OF RIGHT BREAST IN FEMALE, ESTROGEN RECEPTOR POSITIVE (HCC): Primary | ICD-10-CM

## 2022-12-28 DIAGNOSIS — Z17.0 MALIGNANT NEOPLASM OF OVERLAPPING SITES OF RIGHT BREAST IN FEMALE, ESTROGEN RECEPTOR POSITIVE (HCC): Primary | ICD-10-CM

## 2022-12-28 PROCEDURE — 99024 POSTOP FOLLOW-UP VISIT: CPT | Performed by: SURGERY

## 2022-12-28 ASSESSMENT — ENCOUNTER SYMPTOMS
RESPIRATORY NEGATIVE: 1
GASTROINTESTINAL NEGATIVE: 1

## 2022-12-28 NOTE — PROGRESS NOTES
12/28/2022    Anayeli Newell  MRN: [id number]      CHIEF COMPLAINT: Feeling well after surgery      PRIMARY CARE PHYSICIAN: Fawn Sibley MD      HISTORY:  Status post right total mastectomy and sentinel node biopsy on 12/20/2022. DIAGNOSIS        A:  \"RIGHT BREAST\":  POORLY DIFFERENTIATED (3 + 2 + 3), 4.2 CM IN   GREATEST DIMENSION, MARGINS UNINVOLVED; FIBROCYSTIC MASTOPATHY;   INTRADUCTAL PAPILLOMA. B:  \"RIGHT AXILLARY SENTINEL NODES\":  THREE BENIGN LYMPH NODES, ALL   NEGATIVE FOR TUMOR.   pT2pN0  She has no postoperative complaints today. She has not been measuring her PAT output so it is not clear exactly what volume she is draining every day. REVIEW OF SYSTEMS:  Review of Systems   Constitutional: Negative. Respiratory: Negative. Cardiovascular: Negative. Gastrointestinal: Negative. Genitourinary: Negative. Past Medical History:   Diagnosis Date    Cat scratch fever 1973    Elevated liver function tests     Hyperlipidemia     managed with medication    Hypertension     managed with medication    Malignant neoplasm of overlapping sites of right breast in female, estrogen receptor positive ([facility] [address] 2022    Renal insufficiency        Current Outpatient Medications   Medication Sig Dispense Refill    ondansetron (ZOFRAN-ODT) 4 MG disintegrating tablet Take 1 tablet by mouth 3 times daily as needed for Nausea or Vomiting 21 tablet 0    acetaminophen (TYLENOL) 325 MG tablet Take 2 tablets by mouth every 4 hours as needed for Pain (over the counter medication. May take for pain or alternate with Ibuprofen) 1 tablet 0    ibuprofen (ADVIL;MOTRIN) 600 MG tablet Take 1 tablet by mouth 4 times daily as needed for Pain (May alternate with Tylenol for pain.   OVER THE COUNTER MEDICATION) 1 tablet 0    acetaminophen (TYLENOL) 500 MG tablet Take 1,000 mg by mouth every 6 hours as needed for Pain      lisinopril (PRINIVIL;ZESTRIL) 10 MG tablet Take 1 tablet by mouth daily (Patient taking differently: Take 10 mg by mouth at bedtime) 90 tablet 1    Multiple Vitamins-Minerals (MULTIVITAMIN ADULTS 50+ PO) Take by mouth at bedtime      Omega-3 Fatty Acids (FISH OIL) 1200 MG CAPS Take by mouth at bedtime      rosuvastatin (CRESTOR) 20 MG tablet Take 1 tablet by mouth nightly 90 tablet 3     No current facility-administered medications for this visit. Family History   Problem Relation Age of Onset    Thyroid Disease Mother     Heart Disease Mother     High Blood Pressure Father     Heart Disease Father     Thyroid Disease Father     Cancer Sister 48        lung    Diabetes Neg Hx        Social History     Socioeconomic History    Marital status:      Spouse name: None    Number of children: None    Years of education: None    Highest education level: None   Tobacco Use    Smoking status: Never    Smokeless tobacco: Never   Vaping Use    Vaping Use: Never used   Substance and Sexual Activity    Alcohol use: Not Currently     Comment: Socially    Drug use: Never     Social Determinants of Health     Financial Resource Strain: Low Risk     Difficulty of Paying Living Expenses: Not very hard   Food Insecurity: No Food Insecurity    Worried About Running Out of Food in the Last Year: Never true    Oswald of Food in the Last Year: Never true   Transportation Needs: No Transportation Needs    Lack of Transportation (Medical): No    Lack of Transportation (Non-Medical):  No   Physical Activity: Inactive    Days of Exercise per Week: 0 days    Minutes of Exercise per Session: 0 min   Stress: No Stress Concern Present    Feeling of Stress : Not at all   Social Connections: Unknown    Frequency of Communication with Friends and Family: More than three times a week    Frequency of Social Gatherings with Friends and Family: More than three times a week    Marital Status:    Intimate Partner Violence: Not At Risk    Fear of Current or Ex-Partner: No  Emotionally Abused: No    Physically Abused: No    Sexually Abused: No   Housing Stability: Unknown    Unable to Pay for Housing in the Last Year: No    Unstable Housing in the Last Year: No         PHYSICAL EXAMINATION:  Physical Exam  Constitutional:       Appearance: Normal appearance. Cardiovascular:      Rate and Rhythm: Normal rate and regular rhythm. Pulmonary:      Effort: Pulmonary effort is normal.   Skin:     Comments: Right mastectomy incision is healing very well, there is no swelling or signs of infection, PAT is serosanguineous   Neurological:      Mental Status: She is alert. There were no vitals taken for this visit. IMPRESSION:  pT2pN0 breast cancer, post right mastectomy and sentinel node biopsy. Doing well after surgery. I instructed her to monitor the PAT output, record daily and call us when the output is 20 mL/day or less. She will call the office at that point for PAT removal. Has followup with medical oncology. ASSESSMENT/PLAN:  Mylene Christy was seen today for post-op check. Diagnoses and all orders for this visit:    Malignant neoplasm of overlapping sites of right breast in female, estrogen receptor positive [facility])    Follow-up with medical oncology. Follow-up with me in 6 months for repeat examination. Call with any changes.       Cyndee Louis MD

## 2023-01-04 ENCOUNTER — NURSE ONLY (OUTPATIENT)
Dept: SURGERY | Age: 68
End: 2023-01-04

## 2023-01-04 DIAGNOSIS — C50.811 MALIGNANT NEOPLASM OF OVERLAPPING SITES OF RIGHT BREAST IN FEMALE, ESTROGEN RECEPTOR POSITIVE (HCC): Primary | ICD-10-CM

## 2023-01-04 DIAGNOSIS — Z17.0 MALIGNANT NEOPLASM OF OVERLAPPING SITES OF RIGHT BREAST IN FEMALE, ESTROGEN RECEPTOR POSITIVE (HCC): Primary | ICD-10-CM

## 2023-01-04 NOTE — PROGRESS NOTES
Patient presents today to have her drain removed s/p right breast mastectomy on 12/20/2022. Patient states that her output has been less than 20 cc x 2 days. This CMA, removed drain, and applied pressure bandage to the area. Patient tolerated procedure well. Patient had multiple questions that I could not answer, therefore Dr. Thomas Melchor did step into the room to address the patients concerns.

## 2023-01-09 ENCOUNTER — TELEPHONE (OUTPATIENT)
Dept: SURGERY | Age: 68
End: 2023-01-09

## 2023-01-09 ENCOUNTER — OFFICE VISIT (OUTPATIENT)
Dept: SURGERY | Age: 68
End: 2023-01-09

## 2023-01-09 DIAGNOSIS — Z17.0 MALIGNANT NEOPLASM OF OVERLAPPING SITES OF RIGHT BREAST IN FEMALE, ESTROGEN RECEPTOR POSITIVE (HCC): Primary | ICD-10-CM

## 2023-01-09 DIAGNOSIS — C50.811 MALIGNANT NEOPLASM OF OVERLAPPING SITES OF RIGHT BREAST IN FEMALE, ESTROGEN RECEPTOR POSITIVE (HCC): Primary | ICD-10-CM

## 2023-01-09 DIAGNOSIS — Z51.89 VISIT FOR WOUND CHECK: ICD-10-CM

## 2023-01-09 PROCEDURE — 99024 POSTOP FOLLOW-UP VISIT: CPT | Performed by: SURGERY

## 2023-01-09 ASSESSMENT — ENCOUNTER SYMPTOMS
GASTROINTESTINAL NEGATIVE: 1
RESPIRATORY NEGATIVE: 1

## 2023-01-09 NOTE — PROGRESS NOTES
1/9/2023    Savanna Diaz  MRN: [id number]      CHIEF COMPLAINT: Concerned my drain site might be infected      PRIMARY CARE PHYSICIAN: Katy Serrano MD      HISTORY:  Status post right total mastectomy and sentinel node biopsy on 12/20/2022. Drain was removed at her last visit. She has a little redness at the site and is concerned that she might have an infection. She reports a fever at home but is afebrile on her office visit today. REVIEW OF SYSTEMS:  Review of Systems   Constitutional:  Positive for fever. Negative for chills and fatigue. Respiratory: Negative. Cardiovascular: Negative. Gastrointestinal: Negative. Genitourinary: Negative. Past Medical History:   Diagnosis Date    Cat scratch fever 1973    Elevated liver function tests     Hyperlipidemia     managed with medication    Hypertension     managed with medication    Malignant neoplasm of overlapping sites of right breast in female, estrogen receptor positive ([facility] [address] 2022    Renal insufficiency        Current Outpatient Medications   Medication Sig Dispense Refill    ondansetron (ZOFRAN-ODT) 4 MG disintegrating tablet Take 1 tablet by mouth 3 times daily as needed for Nausea or Vomiting 21 tablet 0    acetaminophen (TYLENOL) 325 MG tablet Take 2 tablets by mouth every 4 hours as needed for Pain (over the counter medication. May take for pain or alternate with Ibuprofen) 1 tablet 0    ibuprofen (ADVIL;MOTRIN) 600 MG tablet Take 1 tablet by mouth 4 times daily as needed for Pain (May alternate with Tylenol for pain.   OVER THE COUNTER MEDICATION) 1 tablet 0    acetaminophen (TYLENOL) 500 MG tablet Take 1,000 mg by mouth every 6 hours as needed for Pain      lisinopril (PRINIVIL;ZESTRIL) 10 MG tablet Take 1 tablet by mouth daily (Patient taking differently: Take 10 mg by mouth at bedtime) 90 tablet 1    Multiple Vitamins-Minerals (MULTIVITAMIN ADULTS 50+ PO) Take by mouth at bedtime      Omega-3 Fatty Acids (FISH OIL) 1200 MG CAPS Take by mouth at bedtime      rosuvastatin (CRESTOR) 20 MG tablet Take 1 tablet by mouth nightly 90 tablet 3     No current facility-administered medications for this visit.       Family History   Problem Relation Age of Onset    Thyroid Disease Mother     Heart Disease Mother     High Blood Pressure Father     Heart Disease Father     Thyroid Disease Father     Cancer Sister 50        lung    Diabetes Neg Hx        Social History     Socioeconomic History    Marital status:      Spouse name: None    Number of children: None    Years of education: None    Highest education level: None   Tobacco Use    Smoking status: Never    Smokeless tobacco: Never   Vaping Use    Vaping Use: Never used   Substance and Sexual Activity    Alcohol use: Not Currently     Comment: Socially    Drug use: Never     Social Determinants of Health     Financial Resource Strain: Low Risk     Difficulty of Paying Living Expenses: Not very hard   Food Insecurity: No Food Insecurity    Worried About Running Out of Food in the Last Year: Never true    Ran Out of Food in the Last Year: Never true   Transportation Needs: No Transportation Needs    Lack of Transportation (Medical): No    Lack of Transportation (Non-Medical): No   Physical Activity: Inactive    Days of Exercise per Week: 0 days    Minutes of Exercise per Session: 0 min   Stress: No Stress Concern Present    Feeling of Stress : Not at all   Social Connections: Unknown    Frequency of Communication with Friends and Family: More than three times a week    Frequency of Social Gatherings with Friends and Family: More than three times a week    Marital Status:    Intimate Partner Violence: Not At Risk    Fear of Current or Ex-Partner: No    Emotionally Abused: No    Physically Abused: No    Sexually Abused: No   Housing Stability: Unknown    Unable to Pay for Housing in the Last Year: No    Unstable Housing in the Last Year: No         PHYSICAL  EXAMINATION:  Physical Exam  Constitutional:       Appearance: Normal appearance. Cardiovascular:      Rate and Rhythm: Normal rate and regular rhythm. Pulmonary:      Effort: Pulmonary effort is normal.   Chest:          Comments: Right mastectomy site has healed well, there is no signs of infection, no fluid collections, no tenderness, the drain site just below the old inframammary fold has almost completely closed, there is slight skin irritation at the opening but no signs of infection, I probed the area and was not able to elicit any drainage  Abdominal:      Palpations: Abdomen is soft. Skin:     General: Skin is warm and dry. Neurological:      Mental Status: She is alert. There were no vitals taken for this visit. IMPRESSION:  Right breast cancer status post right total mastectomy and sentinel node biopsy. She is actually healing very well and there is no current signs of infection at the surgical site today. She has an evaluation with Dr. Zack Clay this week. Oncotype returned at 25. She can start any additional treatments as necessary and she can follow-up with me as scheduled. She should call with any changes. ASSESSMENT/PLAN:  Jess Sim was seen today for post-op check. Diagnoses and all orders for this visit:    Malignant neoplasm of overlapping sites of right breast in female, estrogen receptor positive ([facility] [address]    Visit for wound check      Follow-up with Dr. Zack Clay. Follow-up as scheduled.     Varghese Downey MD

## 2023-01-09 NOTE — TELEPHONE ENCOUNTER
Patient called in stating that the area from where her drain was removed, is now \"red, angry, and tender\". She believes that it may be infected. Also states that she has a fever of 102. Patient is wanting to know if she should be seen.

## 2023-01-16 ENCOUNTER — OFFICE VISIT (OUTPATIENT)
Dept: ONCOLOGY | Age: 68
End: 2023-01-16
Payer: MEDICARE

## 2023-01-16 ENCOUNTER — HOSPITAL ENCOUNTER (OUTPATIENT)
Dept: LAB | Age: 68
Discharge: HOME | End: 2023-01-19
Payer: MEDICARE

## 2023-01-16 VITALS
WEIGHT: 175 LBS | OXYGEN SATURATION: 98 % | HEIGHT: 62 IN | RESPIRATION RATE: 14 BRPM | TEMPERATURE: 97.8 F | BODY MASS INDEX: 32.2 KG/M2 | DIASTOLIC BLOOD PRESSURE: 79 MMHG | SYSTOLIC BLOOD PRESSURE: 142 MMHG | HEART RATE: 93 BPM

## 2023-01-16 DIAGNOSIS — Z17.0 MALIGNANT NEOPLASM OF OVERLAPPING SITES OF RIGHT BREAST IN FEMALE, ESTROGEN RECEPTOR POSITIVE (HCC): ICD-10-CM

## 2023-01-16 DIAGNOSIS — R11.0 NAUSEA: ICD-10-CM

## 2023-01-16 DIAGNOSIS — Z17.0 MALIGNANT NEOPLASM OF OVERLAPPING SITES OF RIGHT BREAST IN FEMALE, ESTROGEN RECEPTOR POSITIVE (HCC): Primary | ICD-10-CM

## 2023-01-16 DIAGNOSIS — Z01.812 PRE-PROCEDURE LAB EXAM: ICD-10-CM

## 2023-01-16 DIAGNOSIS — N64.89 OTHER SPECIFIED DISORDERS OF BREAST: ICD-10-CM

## 2023-01-16 DIAGNOSIS — Z11.59 ENCOUNTER FOR SCREENING FOR OTHER VIRAL DISEASES: ICD-10-CM

## 2023-01-16 DIAGNOSIS — C50.811 MALIGNANT NEOPLASM OF OVERLAPPING SITES OF RIGHT BREAST IN FEMALE, ESTROGEN RECEPTOR POSITIVE (HCC): ICD-10-CM

## 2023-01-16 DIAGNOSIS — C50.811 MALIGNANT NEOPLASM OF OVERLAPPING SITES OF RIGHT BREAST IN FEMALE, ESTROGEN RECEPTOR POSITIVE (HCC): Primary | ICD-10-CM

## 2023-01-16 LAB
ALBUMIN SERPL-MCNC: 4.1 G/DL (ref 3.2–4.6)
ALBUMIN/GLOB SERPL: 1.2 (ref 0.4–1.6)
ALP SERPL-CCNC: 81 U/L (ref 50–136)
ALT SERPL-CCNC: 34 U/L (ref 12–65)
ANION GAP SERPL CALC-SCNC: 7 MMOL/L (ref 2–11)
APTT PPP: 28.2 SEC (ref 24.5–34.2)
AST SERPL-CCNC: 19 U/L (ref 15–37)
BASOPHILS # BLD: 0 K/UL (ref 0–0.2)
BASOPHILS NFR BLD: 0 % (ref 0–2)
BILIRUB SERPL-MCNC: 0.5 MG/DL (ref 0.2–1.1)
BUN SERPL-MCNC: 12 MG/DL (ref 8–23)
CALCIUM SERPL-MCNC: 9.1 MG/DL (ref 8.3–10.4)
CHLORIDE SERPL-SCNC: 107 MMOL/L (ref 101–110)
CO2 SERPL-SCNC: 27 MMOL/L (ref 21–32)
CREAT SERPL-MCNC: 1 MG/DL (ref 0.6–1)
DIFFERENTIAL METHOD BLD: ABNORMAL
EOSINOPHIL # BLD: 0.1 K/UL (ref 0–0.8)
EOSINOPHIL NFR BLD: 2 % (ref 0.5–7.8)
ERYTHROCYTE [DISTWIDTH] IN BLOOD BY AUTOMATED COUNT: 13.1 % (ref 11.9–14.6)
GLOBULIN SER CALC-MCNC: 3.5 G/DL (ref 2.8–4.5)
GLUCOSE SERPL-MCNC: 94 MG/DL (ref 65–100)
HBV SURFACE AG SER QL: NONREACTIVE
HCT VFR BLD AUTO: 44.6 % (ref 35.8–46.3)
HGB BLD-MCNC: 14.6 G/DL (ref 11.7–15.4)
IMM GRANULOCYTES # BLD AUTO: 0 K/UL (ref 0–0.5)
IMM GRANULOCYTES NFR BLD AUTO: 0 % (ref 0–5)
INR PPP: 0.9
LYMPHOCYTES # BLD: 2.4 K/UL (ref 0.5–4.6)
LYMPHOCYTES NFR BLD: 32 % (ref 13–44)
MCH RBC QN AUTO: 28.2 PG (ref 26.1–32.9)
MCHC RBC AUTO-ENTMCNC: 32.7 G/DL (ref 31.4–35)
MCV RBC AUTO: 86.1 FL (ref 82–102)
MONOCYTES # BLD: 0.8 K/UL (ref 0.1–1.3)
MONOCYTES NFR BLD: 10 % (ref 4–12)
NEUTS SEG # BLD: 4.4 K/UL (ref 1.7–8.2)
NEUTS SEG NFR BLD: 56 % (ref 43–78)
NRBC # BLD: 0 K/UL (ref 0–0.2)
PLATELET # BLD AUTO: 287 K/UL (ref 150–450)
PMV BLD AUTO: 8.8 FL (ref 9.4–12.3)
POTASSIUM SERPL-SCNC: 3.7 MMOL/L (ref 3.5–5.1)
PROT SERPL-MCNC: 7.6 G/DL (ref 6.3–8.2)
PROTHROMBIN TIME: 12.4 SEC (ref 12.6–14.3)
RBC # BLD AUTO: 5.18 M/UL (ref 4.05–5.2)
SODIUM SERPL-SCNC: 141 MMOL/L (ref 133–143)
WBC # BLD AUTO: 7.8 K/UL (ref 4.3–11.1)

## 2023-01-16 PROCEDURE — 85610 PROTHROMBIN TIME: CPT

## 2023-01-16 PROCEDURE — 3074F SYST BP LT 130 MM HG: CPT | Performed by: INTERNAL MEDICINE

## 2023-01-16 PROCEDURE — 86704 HEP B CORE ANTIBODY TOTAL: CPT

## 2023-01-16 PROCEDURE — G8399 PT W/DXA RESULTS DOCUMENT: HCPCS | Performed by: INTERNAL MEDICINE

## 2023-01-16 PROCEDURE — 87340 HEPATITIS B SURFACE AG IA: CPT

## 2023-01-16 PROCEDURE — 1036F TOBACCO NON-USER: CPT | Performed by: INTERNAL MEDICINE

## 2023-01-16 PROCEDURE — 99214 OFFICE O/P EST MOD 30 MIN: CPT | Performed by: INTERNAL MEDICINE

## 2023-01-16 PROCEDURE — 1123F ACP DISCUSS/DSCN MKR DOCD: CPT | Performed by: INTERNAL MEDICINE

## 2023-01-16 PROCEDURE — 86706 HEP B SURFACE ANTIBODY: CPT

## 2023-01-16 PROCEDURE — 36415 COLL VENOUS BLD VENIPUNCTURE: CPT

## 2023-01-16 PROCEDURE — 3078F DIAST BP <80 MM HG: CPT | Performed by: INTERNAL MEDICINE

## 2023-01-16 PROCEDURE — 80053 COMPREHEN METABOLIC PANEL: CPT

## 2023-01-16 PROCEDURE — 85025 COMPLETE CBC W/AUTO DIFF WBC: CPT

## 2023-01-16 PROCEDURE — G8428 CUR MEDS NOT DOCUMENT: HCPCS | Performed by: INTERNAL MEDICINE

## 2023-01-16 PROCEDURE — 3017F COLORECTAL CA SCREEN DOC REV: CPT | Performed by: INTERNAL MEDICINE

## 2023-01-16 PROCEDURE — 80074 ACUTE HEPATITIS PANEL: CPT

## 2023-01-16 PROCEDURE — 1090F PRES/ABSN URINE INCON ASSESS: CPT | Performed by: INTERNAL MEDICINE

## 2023-01-16 PROCEDURE — G8417 CALC BMI ABV UP PARAM F/U: HCPCS | Performed by: INTERNAL MEDICINE

## 2023-01-16 PROCEDURE — 1111F DSCHRG MED/CURRENT MED MERGE: CPT | Performed by: INTERNAL MEDICINE

## 2023-01-16 PROCEDURE — 85730 THROMBOPLASTIN TIME PARTIAL: CPT

## 2023-01-16 PROCEDURE — G8484 FLU IMMUNIZE NO ADMIN: HCPCS | Performed by: INTERNAL MEDICINE

## 2023-01-16 RX ORDER — DEXAMETHASONE 4 MG/1
8 TABLET ORAL 2 TIMES DAILY WITH MEALS
Qty: 48 TABLET | Refills: 0 | Status: SHIPPED | OUTPATIENT
Start: 2023-01-16 | End: 2023-01-26

## 2023-01-16 RX ORDER — ONDANSETRON 4 MG/1
4 TABLET, FILM COATED ORAL 3 TIMES DAILY PRN
Qty: 30 TABLET | Refills: 0 | Status: SHIPPED | OUTPATIENT
Start: 2023-01-16

## 2023-01-16 RX ORDER — PROCHLORPERAZINE MALEATE 10 MG
10 TABLET ORAL EVERY 6 HOURS PRN
Qty: 120 TABLET | Refills: 0 | Status: SHIPPED | OUTPATIENT
Start: 2023-01-16

## 2023-01-16 RX ORDER — LIDOCAINE AND PRILOCAINE 25; 25 MG/G; MG/G
CREAM TOPICAL
Qty: 30 G | Refills: 0 | Status: SHIPPED | OUTPATIENT
Start: 2023-01-16

## 2023-01-16 ASSESSMENT — PATIENT HEALTH QUESTIONNAIRE - PHQ9
SUM OF ALL RESPONSES TO PHQ9 QUESTIONS 1 & 2: 0
SUM OF ALL RESPONSES TO PHQ QUESTIONS 1-9: 0
SUM OF ALL RESPONSES TO PHQ QUESTIONS 1-9: 0
2. FEELING DOWN, DEPRESSED OR HOPELESS: 0
1. LITTLE INTEREST OR PLEASURE IN DOING THINGS: 0
SUM OF ALL RESPONSES TO PHQ QUESTIONS 1-9: 0
SUM OF ALL RESPONSES TO PHQ QUESTIONS 1-9: 0

## 2023-01-16 NOTE — PATIENT INSTRUCTIONS
Patient Instructions from Today's Visit    Reason for Visit:  Follow up. Diagnosis Information:  [email or web address] [email or web address]-[email or web address]-[email or web address]-education-[email or web address]      Plan:  Chemotherapy is recommended. Your Chemotherapy Plan  Dexamethasone 4 mg two by mouth twice a day the day before, the day of and the day after chemo. This chemo can be given peripherally or through central line    Diagnosis: Breast Cancer     Goal of Therapy: __ Palliative      _X_Curative         Name of Chemotherapy medications: Taxotere/Cytoxan    Number of Cycles Planned: Both drugs given together once every 3 weeks x4 doses                Length of Cycle:  3 weeks      Chemotherapy plan is subject to change at your provider's discretion    A copy of this plan has been discussed and given to patient  by Robyn Echeverria RN. Chemo Education:   Scheduled  1/27  Previously reviewed n/a    Consent for therapy:   Completed on     Potential side effects:  fatigue, decrease in blood counts, taste and appetite changes, hair loss, fluid retention, nausea, mouth sores, nail changes, etc.     Will have a chemo education session prior to starting treatment. Follow Up: In a few weeks to start treatment. Recent Lab Results:  N/a      Treatment Summary has been discussed and given to patient: n/a        -------------------------------------------------------------------------------------------------------------------  Please call our office at [phone] if you have any  of the following symptoms:   Fever of 100.5 or greater  Chills  Shortness of breath  Swelling or pain in one leg    After office hours an answering service is available and will contact a provider for emergencies or if you are experiencing any of the above symptoms. Patient did express an interest in My Chart. My Chart log in information explained on the after visit summary printout at the [facility] [address] desk.     Robyn Echeverria, RN        Use of nausea medications  Rest in a chair after meals; don't lie flat for at least 2 hours  Avoid fatty, spicy, fried and highly sweet foods  Eat bland foods and drink plenty of fluids  Eat smaller, frequent snack size meals  Avoid food aromas and strong smells that trigger nausea  It is important to take anti-nausea medications as prescribed   Take zofran every 8 hours around the clock for several days after chemo to help control nausea (morning, mid-day, and bedtime). This is good at preventing nausea from starting. If you have additional nausea, compazine and/or ativan can be taken in between zofran doses  Do not stop zofran, just take compazine in addition to zofran  If nausea is still not controlled please call, there are other medications we can try  Please don't think you should have nausea because you are receiving chemo. There are other medications we can give you to help. Diarrhea  Loose or watery bowel movements more than 5 times a day  Make sure you are drinking plenty of fluids to help prevent you from getting dehydrated. Normally need at least 64 ounces of fluids each day. Now you need more to replace all fluids lost with diarrhea. Avoid foods containing roughage and bulk (bran, whole grain cereals, dried fruit, and vegetables)  Avoid milk and milk products if they make diarrhea worse  Eat BRAT diet - bananas, rice, applesauce and toast  Eat foods high in potassium (bananas, oranges, baked potatoes, broccoli, mushrooms and apricots)   Eat small frequent snack size meals  Take anti-diarrhea medications as prescribed. Over the counter imodium/loperamide, take 1 - 2 tablets after each loose stool. No more than 8 tablets in a day  If imodium is not controlling diarrhea please call, there is a prescription medication that can be taken with imodium.

## 2023-01-16 NOTE — ONCOLOGY
START ON PATHWAY REGIMEN - Breast    [id number]        Docetaxel (Taxotere)       Cyclophosphamide     **Always confirm dose/schedule in your pharmacy ordering system**    Patient Characteristics:  Postoperative without Neoadjuvant Therapy (Pathologic Staging), Invasive Disease, Adjuvant Therapy, HER2 Negative/Unknown/Equivocal, ER Positive, Node Negative, pT1a-c, pN0/N1mi or pT2 or Higher, pN0, Oncotype Intermediate Risk (11 - 25), RSClin Individualized Absolute Chemotherapy Benefit Estimate > 3-5% or RSClin Not Utilized and/or Chemotherapy Preferred  Therapeutic Status: Postoperative without Neoadjuvant Therapy (Pathologic Staging)  AJCC Grade: G3  AJCC N Category: pN0  AJCC M Category: cM0  ER Status: Positive (+)  AJCC 8 Stage Grouping: IB  HER2 Status: Negative (-)  Oncotype Dx Recurrence Score: 25  AJCC T Category: pT2  IN Status: Positive (+)  Adjuvant Therapy Status: No Adjuvant Therapy Received Yet or Changing Initial Adjuvant Regimen due to Tolerance  Has this patient completed genomic testing<= Yes - Oncotype DX®  RSClin Individualized Absolute Chemotherapy Benefit Estimate or Treatment Preferred: RSClin Individualized Absolute Chemotherapy Benefit Estimate > 3-5%  Intent of Therapy:  Curative Intent, Discussed with Patient

## 2023-01-16 NOTE — PROGRESS NOTES
[facility] Hematology and Oncology: Office Visit Established Patient    Chief Complaint:    Chief Complaint   Patient presents with    Follow-up         History of Present Illness:  Ms. Engel is a 67 y.o. female who presents today for follow-up regarding breast cancer.  She initially presented for a routine bilateral screening mammogram on 9/9/22 which identified a right posterocentral outer mid breast mass and associated microcalcifications. Further evaluation with right breast diagnostic mammogram on 9/27/2022 showed mass-like density and associated calcifications persisted in the outer right breast demonstrating suspicious features with spiculated margins and suggested architectural distortion. Right breast ultrasound also performed on 9/27/22 confirmed a hypoechoic shadowing mass at the 9 o'clock position of the right breast measuring 2.7 cm x 3 cm x 1.9 cm demonstrating multiple highly suspicious features.   Recommended ultrasound-guided biopsy of the right breast lesion was performed on 10/5/22 with pathology revealing ER 98%/WV 92% positive, HER-2 (0) negative, high grade infiltrating ductal carcinoma.  MRI of the bilateral breasts as completed on 10/19/22 demonstrating right 9:00 breast cancer measuring up to 6.4 cm by MRI, with no suspicious left breast finding and no obvious lymphadenopathy.  She was referred to [facility] for Medical Oncology evaluation and treatment.  Normally we would recommend upfront surgery for a tumor with strong HR expression and negative HER2, but the size of the tumor on MRI suggests that she cannot have breast conserving surgery unless she has some cytoreduction prior.  If she does strongly desire BCT and it is not feasible with her current tumor dimensions, I would recommend AC-T for neoadjuvant therapy.  On the other hand, if she is willing to consider mastectomy, I would favor upfront surgery as the questionable tumor dimensions, the clinically negative nodes, and the strong HR  expression may indicate that gene recurrence testing would be of benefit and chemotherapy unnecessary. Radiation would be after surgery or chemotherapy and would depend on surgical pathology, but she will definitely require endocrine therapy. Here for follow-up. She did elect to move forward with upfront surgery, she completed mastectomy and sentinel node biopsy on 12/20/22. She is healing well, no complaints today. Review of Systems:  Constitutional: Negative. HENT: Negative. Eyes: Negative. Respiratory: Negative. Cardiovascular: Negative. Gastrointestinal: Negative. Genitourinary: Negative. Musculoskeletal: Negative. Skin: Negative. Neurological: Negative. Endo/Heme/Allergies: Negative. Psychiatric/Behavioral: Negative. All other systems reviewed and are negative.      No Known Allergies  Past Medical History:   Diagnosis Date    Cat scratch fever 1973    Elevated liver function tests     Hyperlipidemia     managed with medication    Hypertension     managed with medication    Malignant neoplasm of overlapping sites of right breast in female, estrogen receptor positive ([facility] [address] 2022    Renal insufficiency      Past Surgical History:   Procedure Laterality Date    BREAST BIOPSY Right 12/20/2022    RIGHT SENTINEL NODE BIOPSY  performed by Inder Ny MD at [facility] Right 12/20/2022    RIGHT BREAST MASTECTOMY performed by Inder Ny MD at [address] [place] [address]      right lymph node excision - neck    US BREAST BIOPSY W LOC DEVICE 1ST LESION RIGHT Right 10/05/2022    US BREAST NEEDLE BIOPSY RIGHT 10/5/2022 Allison Brunner, MD [facility] MAMMO     Family History   Problem Relation Age of Onset    Thyroid Disease Mother     Heart Disease Mother     High Blood Pressure Father     Heart Disease Father     Thyroid Disease Father     Cancer Sister 48        lung    Diabetes Neg Hx      Social History     Socioeconomic History Marital status:      Spouse name: Not on file    Number of children: Not on file    Years of education: Not on file    Highest education level: Not on file   Occupational History    Not on file   Tobacco Use    Smoking status: Never    Smokeless tobacco: Never   Vaping Use    Vaping Use: Never used   Substance and Sexual Activity    Alcohol use: Not Currently     Comment: Socially    Drug use: Never    Sexual activity: Not on file   Other Topics Concern    Not on file   Social History Narrative    Not on file     Social Determinants of Health     Financial Resource Strain: Low Risk     Difficulty of Paying Living Expenses: Not very hard   Food Insecurity: No Food Insecurity    Worried About Running Out of Food in the Last Year: Never true    [address] in the Last Year: Never true   Transportation Needs: No Transportation Needs    Lack of Transportation (Medical): No    Lack of Transportation (Non-Medical):  No   Physical Activity: Inactive    Days of Exercise per Week: 0 days    Minutes of Exercise per Session: 0 min   Stress: No Stress Concern Present    Feeling of Stress : Not at all   Social Connections: Unknown    Frequency of Communication with Friends and Family: More than three times a week    Frequency of Social Gatherings with Friends and Family: More than three times a week    Attends Bahai Services: Not on file    Active Member of Clubs or Organizations: Not on file    Attends Club or Organization Meetings: Not on file    Marital Status:    Intimate Partner Violence: Not At Risk    Fear of Current or Ex-Partner: No    Emotionally Abused: No    Physically Abused: No    Sexually Abused: No   Housing Stability: Unknown    Unable to Pay for Housing in the Last Year: No    Number of Places Lived in the Last Year: Not on file    Unstable Housing in the Last Year: No     Current Outpatient Medications   Medication Sig Dispense Refill    ondansetron (ZOFRAN) 4 MG tablet Take 1 tablet by mouth 3 times daily as needed for Nausea or Vomiting 30 tablet 0    prochlorperazine (COMPAZINE) 10 MG tablet Take 1 tablet by mouth every 6 hours as needed (nausea) 120 tablet 0    lidocaine-prilocaine (EMLA) 2.5-2.5 % cream Apply topically as needed. 30 g 0    dexamethasone (DECADRON) 4 MG tablet Take 2 tablets by mouth 2 times daily (with meals) for 10 days Take 2 tabs two times a day the day before, day of, and day after chemo. 48 tablet 0    Multiple Vitamins-Minerals (MULTIVITAMIN ADULTS 50+ PO) Take by mouth at bedtime      Omega-3 Fatty Acids (FISH OIL) 1200 MG CAPS Take by mouth at bedtime      rosuvastatin (CRESTOR) 20 MG tablet Take 1 tablet by mouth nightly 90 tablet 3    ondansetron (ZOFRAN-ODT) 4 MG disintegrating tablet Take 1 tablet by mouth 3 times daily as needed for Nausea or Vomiting (Patient not taking: Reported on 1/16/2023) 21 tablet 0    acetaminophen (TYLENOL) 325 MG tablet Take 2 tablets by mouth every 4 hours as needed for Pain (over the counter medication. May take for pain or alternate with Ibuprofen) (Patient not taking: Reported on 1/16/2023) 1 tablet 0    ibuprofen (ADVIL;MOTRIN) 600 MG tablet Take 1 tablet by mouth 4 times daily as needed for Pain (May alternate with Tylenol for pain. OVER THE COUNTER MEDICATION) 1 tablet 0    acetaminophen (TYLENOL) 500 MG tablet Take 1,000 mg by mouth every 6 hours as needed for Pain (Patient not taking: Reported on 1/16/2023)      lisinopril (PRINIVIL;ZESTRIL) 10 MG tablet Take 1 tablet by mouth daily (Patient not taking: Reported on 1/16/2023) 90 tablet 1     No current facility-administered medications for this visit. OBJECTIVE:  BP (!) 142/79   Pulse 93   Temp 97.8 °F (36.6 °C) (Oral)   Resp 14   Ht 5' 2\" (1.575 m)   Wt 175 lb (79.4 kg)   SpO2 98%   BMI 32.01 kg/m²     Physical Exam:  Constitutional: Well developed, well nourished female in no acute distress, sitting comfortably on the examination table.     HEENT: Normocephalic and atraumatic. Sclerae anicteric. Skin Warm and dry. No bruising and no rash noted. No erythema. No pallor. Cardiopulmonary Deferred. Neuro Grossly nonfocal with no obvious sensory or motor deficits. MSK Normal range of motion in general.  No edema and no tenderness. Psych Appropriate mood and affect. Labs:  No results found for this or any previous visit (from the past 24 hour(s)). Imaging:  MRI BREAST BILATERAL W WO CONTRAST    Result Date: 10/20/2022  MRI of the Breasts with and without contrast CLINICAL INDICATION:  New diagnosis of right breast 9:00 infiltrating ductal carcinoma high grade undergoing evaluation for extent of disease, staging, therapy planning. No previous personal malignancy or breast surgery in the past otherwise. COMPARISON: Ultrasound and mammography 10/5/2022, 9/27/2022, 9/9/2022 TECHNIQUE: Standard MRI sequences were obtained through the breasts in multiple planes. Images were obtained before and after intravenous infusion of 17 mL of Prohance contrast. Images were reviewed with PACS and with [employer] CAD software. FINDINGS: The breasts demonstrate moderate glandularity and mild background enhancement. Right breast: An 9:00 midposterior depth are biopsy changes within the irregular heterogeneously enhancing malignant mass measuring up to 4.0 x 2.6 x 3.3 cm. Anterior to the mass (within 1.5 cm of the anterior margin) system are 2 tiny 3 mm satellite nodules. Overall the MRI area of concern measures up to 6.4 cm AP. Note that on recent mammography, malignant-type calcifications extending anteriorly and posteriorly from the mass for total AP extent of at least 8 cm. Left breast: No evidence of suspicious enhancing mass, and no dominant or unique nonmass enhancement, to suggest malignancy. Lymph nodes: No obvious axillary or internal mammary lymphadenopathy.  Several bilateral axillary lymph nodes are relatively symmetric in size and number, all demonstrating preservation of expected reniform shape with fatty grant. Elsewhere colon limited inclusion of the thorax and upper abdomen shows a partially visualized hiatal hernia which is not entirely seen or evaluated here. 1. Right 9:00 breast cancer measures up to 6.4 cm by MRI (recent mammography demonstrated at least 8 cm of expected disease). 2. No suspicious left breast finding. 3. No obvious lymphadenopathy. Recommend continued malignancy management as directed clinically. BI-RADS Assessment Category 6: Known Biopsy Proven Malignancy     ZIA DIGITAL DIAGNOSTIC W OR WO CAD RIGHT    Result Date: 9/27/2022  RIGHT BREAST DIAGNOSTIC MAMMOGRAM and RIGHT BREAST ULTRASOUND, 9/27/2022. CLINICAL HISTORY: Abnormal screening mammogram. COMPARISON STUDY: Screening mammogram 9/9/2020. FINDINGS: RIGHT BREAST DIAGNOSTIC MAMMOGRAM: Straight mediolateral view of the right breast as well as compression images of the right breast in the CC, and MLO plane are submitted for evaluation. Masslike density and associated calcifications persist in the outer right breast. These demonstrate suspicious features with spiculated margins and suggest architectural distortion. Additional associated calcifications also suspicious. Further characterization with focused diagnostic ultrasound is recommended. RIGHT BREAST ULTRASOUND, 9/27/2022. CLINICAL HISTORY: Abnormal screening and diagnostic mammogram. Technique: Grayscale, and Doppler imaging of the right breast soft tissues was performed using a 15 MHz transducer. FINDINGS: Focused ultrasound imaging at the 9 o'clock position of the right breast in the area of masslike density as seen on prior mammogram imaging shows a hypoechoic shadowing mass measuring 2.7 cm x 3 cm x 1.9 cm demonstrating multiple highly suspicious features such as irregular margins, shadowing, and a vertical configuration for which a benign process cannot be confirmed. 1. Highly suspicious right breast mass.  Further evaluation with ultrasound-guided biopsy is recommended. BI-RADS Assessment Category 5: Highly suggestive of malignancy- Appropriate action should be taken. US BREAST LIMITED RIGHT    Result Date: 9/27/2022  RIGHT BREAST DIAGNOSTIC MAMMOGRAM and RIGHT BREAST ULTRASOUND, 9/27/2022. CLINICAL HISTORY: Abnormal screening mammogram. COMPARISON STUDY: Screening mammogram 9/9/2020. FINDINGS: RIGHT BREAST DIAGNOSTIC MAMMOGRAM: Straight mediolateral view of the right breast as well as compression images of the right breast in the CC, and MLO plane are submitted for evaluation. Masslike density and associated calcifications persist in the outer right breast. These demonstrate suspicious features with spiculated margins and suggest architectural distortion. Additional associated calcifications also suspicious. Further characterization with focused diagnostic ultrasound is recommended. RIGHT BREAST ULTRASOUND, 9/27/2022. CLINICAL HISTORY: Abnormal screening and diagnostic mammogram. Technique: Grayscale, and Doppler imaging of the right breast soft tissues was performed using a 15 MHz transducer. FINDINGS: Focused ultrasound imaging at the 9 o'clock position of the right breast in the area of masslike density as seen on prior mammogram imaging shows a hypoechoic shadowing mass measuring 2.7 cm x 3 cm x 1.9 cm demonstrating multiple highly suspicious features such as irregular margins, shadowing, and a vertical configuration for which a benign process cannot be confirmed. 1. Highly suspicious right breast mass. Further evaluation with ultrasound-guided biopsy is recommended. BI-RADS Assessment Category 5: Highly suggestive of malignancy- Appropriate action should be taken. MAMMOGRAM POST BX CLIP PLACEMENT RIGHT    Result Date: 10/5/2022  POSTBIOPSY MAMMOGRAM, 10/5/2022. CLINICAL HISTORY: Status post percutaneous biopsy.  FINDINGS: CC, ML views of the right breast demonstrate the biopsy clip in the outer soft tissues of the right breast. No significant post biopsy hematoma is seen. There has been successful placement of the biopsy clip occurring within the more lateral aspect of the indeterminate mass. 1. Successful biopsy and placement of marker clip. This is a post biopsy mammogram and does not require BI-RADS categorization. US BREAST BIOPSY W LOC DEVICE 1ST LESION RIGHT    Addendum Date: 10/12/2022    Addendum: Dexter Lewis, accession number: [id number] Date: 10/5/2022 Pathology was noted as A: Right breast, 9:00 position, 12 cm from nipple, core biopsy: Infiltrating ductal carcinoma, high grade (poorly differentiated). Definite in situ component and lymphovascular invasion are not identified. Results concordant with imaging. Pathology report was faxed to  [address] [place] [address]'s office on 10/6/2022 by RT Lara(HARMONY)(EVELIN). Patient was referred to Oncology services on 10/6/2022. Result Date: 10/12/2022  Ultrasound-guided right breast biopsy, 10/5/2022. CLINICAL HISTORY: Right breast lesion. PROCEDURE: After obtaining written informed consent, the patient was placed in a supine position on the ultrasound table. Preliminary imaging demonstrates the 2.5 cm x 2.5 cm x 2.4 cm hypoechoic lesion at the 9 o'clock position of the right breast. The right breast was prepped and draped in the usual sterile fashion. Lidocaine was used for local anesthesia. Using ultrasound guidance, a 14-gauge Assure needle was positioned just proximal to the lesion with the sampling trough subsequently placed through the lesion. A total of 5 samples were taken with ultrasound documentation of each pass. A biopsy clip was placed at the site of biopsy. The biopsy apparatus was removed and hemostasis was achieved. No procedure or immediate postprocedure complications were noted. The patient left the department in good condition. 1. Successful biopsy of right breast lesion with histologic results pending.        Pathology:  DIAGNOSIS        A:  \"RIGHT BREAST\":  POORLY DIFFERENTIATED (3 + 2 + 3), 4.2 CM IN   GREATEST DIMENSION, MARGINS UNINVOLVED; FIBROCYSTIC MASTOPATHY;   INTRADUCTAL PAPILLOMA. B:  \"RIGHT AXILLARY SENTINEL NODES\":  THREE BENIGN LYMPH NODES, ALL   NEGATIVE FOR TUMOR.                Sign Out Date: 2022  Nashbelgica Fregoso DERRICK Duckworth MD       Procedures/Addenda                     STF - ONCOTYPE DX ASSAY                                                                                                                                         Status:  Signed Out    Yaya Duckworth MD on 2023                                 Interpretation                       Oncotype DX Breast Recurrence Score:  25       DIAGNOSIS        A:  \" RIGHT BREAST, 9:00 POSITION, 12 CM FROM NIPPLE, CORE BIOPSY\":         INFILTRATING DUCTAL CARCINOMA, HIGH GRADE (POORLY DIFFERENTIATED). DEFINITE IN SITU COMPONENT AND LYMPHOVASCULAR INVASION ARE NOT IDENTIFIED             Procedures/Addenda                     STF- ER/DE/[id number] BY IHC                                                                                                                                         Status:  Signed Out    Lucio Mooney MD on 10/10/2022                                 Interpretation                       [employer] IHC Quantitative Breast Panel Result: A1     TEST NAME:                         RESULTS:               INTERNAL   CONTROLS:     ESTROGEN RECEPTOR:               Positive (98%)               Present,   positive   PROGESTERONE RECEPTOR:          Positive (92%)               Present,   positive   HER-2/SHARDA:                         Negative (0)               Percentage   of Cells with Uniform        Intense Complete Membrane   Stainin%                   ASSESSMENT:   Diagnosis Orders   1.  Malignant neoplasm of overlapping sites of right breast in female, estrogen receptor positive (HCC)  ondansetron (ZOFRAN) 4 MG tablet    prochlorperazine (COMPAZINE) 10 MG tablet    lidocaine-prilocaine (EMLA) 2.5-2.5 % cream    IR PORT PLACEMENT > 5 YEARS    CBC with Auto Differential    Comprehensive Metabolic Panel    Hepatitis Panel, Acute    Hepatitis B Core Antibody, Total    Hepatitis B Surface Antibody    Hepatitis B Surface Antigen    APTT    Protime-INR    dexamethasone (DECADRON) 4 MG tablet      2. Nausea  ondansetron (ZOFRAN) 4 MG tablet    prochlorperazine (COMPAZINE) 10 MG tablet    lidocaine-prilocaine (EMLA) 2.5-2.5 % cream    CBC with Auto Differential    Comprehensive Metabolic Panel    Hepatitis Panel, Acute    Hepatitis B Core Antibody, Total    Hepatitis B Surface Antibody    Hepatitis B Surface Antigen    APTT    Protime-INR    dexamethasone (DECADRON) 4 MG tablet      3. Pre-procedure lab exam  CBC with Auto Differential    Comprehensive Metabolic Panel    Hepatitis Panel, Acute    Hepatitis B Core Antibody, Total    Hepatitis B Surface Antibody    Hepatitis B Surface Antigen    APTT    Protime-INR      4. Encounter for screening for other viral diseases   Hepatitis B Core Antibody, Total    Hepatitis B Surface Antibody    Hepatitis B Surface Antigen    APTT    Protime-INR      5. Other specified disorders of breast   APTT    Protime-INR          Patient Active Problem List   Diagnosis    Elevated liver function tests    Dyslipidemia    Weight gain    Renal insufficiency    Malignant neoplasm of overlapping sites of right breast in female, estrogen receptor positive ([facility] [address]    Primary hypertension    Ductal carcinoma in situ of right breast    Breast cancer in female [facility])           PLAN:  Lab studies and imaging studies were personally reviewed. Pertinent old records were reviewed. Breast cancer: 3cm by ultrasound but up to 6.4 cm on MRI (including two tiny satellite nodules), high grade, ER/OK strongly positive, HER2 0. Upfront mastectomy shows the tumor to be 4.2 cm with 3 negative sentinel nodes, pT2pN0.   Because the tumor was under 4.5 cm we recommended Oncotype Dx, which returned at 25. Although this was technically in the range included in [address], her clinicopathologic features including the size and grade are concerning. Indeed, when entered into [facility], her risk of recurrence is 35% with a reduction from chemotherapy of 14%. This is well above the threshold for appropriateness of chemotherapy, I would recommend TC for 4 cycles. This would be followed by adjuvant endocrine therapy, she does not require radiation. She understands and agrees to proceed with chemotherapy. All questions were asked and answered to the best of my ability. In all, I spent 30 minutes in the care of Ms. Engel today, over 50% of which was in direct counseling and coordination of care. F/u for cycle 1 TC.              Aidee Barber MD, MD  [address] [place] [address] Hematology and Oncology  [address] [place] [address]  Office : [phone]  Fax : [phone]

## 2023-01-17 LAB
HAV IGM SER QL: NONREACTIVE
HBV CORE AB SERPL QL IA: NEGATIVE
HBV CORE IGM SER QL: NONREACTIVE
HBV SURFACE AB SERPL IA-ACNC: <3.1 MIU/ML
HBV SURFACE AG SER QL: NONREACTIVE
HCV AB SER QL: NONREACTIVE

## 2023-01-26 DIAGNOSIS — C50.811 MALIGNANT NEOPLASM OF OVERLAPPING SITES OF RIGHT BREAST IN FEMALE, ESTROGEN RECEPTOR POSITIVE (HCC): Primary | ICD-10-CM

## 2023-01-26 DIAGNOSIS — Z79.899 HIGH RISK MEDICATION USE: ICD-10-CM

## 2023-01-26 DIAGNOSIS — Z17.0 MALIGNANT NEOPLASM OF OVERLAPPING SITES OF RIGHT BREAST IN FEMALE, ESTROGEN RECEPTOR POSITIVE (HCC): Primary | ICD-10-CM

## 2023-01-27 ENCOUNTER — OFFICE VISIT (OUTPATIENT)
Dept: ONCOLOGY | Age: 68
End: 2023-01-27
Payer: MEDICARE

## 2023-01-27 ENCOUNTER — CLINICAL DOCUMENTATION (OUTPATIENT)
Dept: ONCOLOGY | Age: 68
End: 2023-01-27

## 2023-01-27 VITALS
TEMPERATURE: 97.7 F | WEIGHT: 173.3 LBS | OXYGEN SATURATION: 94 % | HEART RATE: 87 BPM | SYSTOLIC BLOOD PRESSURE: 132 MMHG | RESPIRATION RATE: 17 BRPM | HEIGHT: 62 IN | DIASTOLIC BLOOD PRESSURE: 77 MMHG | BODY MASS INDEX: 31.89 KG/M2

## 2023-01-27 DIAGNOSIS — C50.811 MALIGNANT NEOPLASM OF OVERLAPPING SITES OF RIGHT BREAST IN FEMALE, ESTROGEN RECEPTOR POSITIVE (HCC): Primary | ICD-10-CM

## 2023-01-27 DIAGNOSIS — Z71.9 ENCOUNTER FOR EDUCATION: ICD-10-CM

## 2023-01-27 DIAGNOSIS — Z17.0 MALIGNANT NEOPLASM OF OVERLAPPING SITES OF RIGHT BREAST IN FEMALE, ESTROGEN RECEPTOR POSITIVE (HCC): Primary | ICD-10-CM

## 2023-01-27 PROCEDURE — G8484 FLU IMMUNIZE NO ADMIN: HCPCS | Performed by: NURSE PRACTITIONER

## 2023-01-27 PROCEDURE — 3017F COLORECTAL CA SCREEN DOC REV: CPT | Performed by: NURSE PRACTITIONER

## 2023-01-27 PROCEDURE — 1036F TOBACCO NON-USER: CPT | Performed by: NURSE PRACTITIONER

## 2023-01-27 PROCEDURE — 3075F SYST BP GE 130 - 139MM HG: CPT | Performed by: NURSE PRACTITIONER

## 2023-01-27 PROCEDURE — 1123F ACP DISCUSS/DSCN MKR DOCD: CPT | Performed by: NURSE PRACTITIONER

## 2023-01-27 PROCEDURE — G8427 DOCREV CUR MEDS BY ELIG CLIN: HCPCS | Performed by: NURSE PRACTITIONER

## 2023-01-27 PROCEDURE — 3078F DIAST BP <80 MM HG: CPT | Performed by: NURSE PRACTITIONER

## 2023-01-27 PROCEDURE — 1090F PRES/ABSN URINE INCON ASSESS: CPT | Performed by: NURSE PRACTITIONER

## 2023-01-27 PROCEDURE — 99215 OFFICE O/P EST HI 40 MIN: CPT | Performed by: NURSE PRACTITIONER

## 2023-01-27 PROCEDURE — G8417 CALC BMI ABV UP PARAM F/U: HCPCS | Performed by: NURSE PRACTITIONER

## 2023-01-27 PROCEDURE — G8399 PT W/DXA RESULTS DOCUMENT: HCPCS | Performed by: NURSE PRACTITIONER

## 2023-01-27 ASSESSMENT — PATIENT HEALTH QUESTIONNAIRE - PHQ9
SUM OF ALL RESPONSES TO PHQ QUESTIONS 1-9: 0
SUM OF ALL RESPONSES TO PHQ9 QUESTIONS 1 & 2: 0
2. FEELING DOWN, DEPRESSED OR HOPELESS: 0
SUM OF ALL RESPONSES TO PHQ QUESTIONS 1-9: 0
1. LITTLE INTEREST OR PLEASURE IN DOING THINGS: 0
SUM OF ALL RESPONSES TO PHQ QUESTIONS 1-9: 0
SUM OF ALL RESPONSES TO PHQ QUESTIONS 1-9: 0

## 2023-01-27 NOTE — PROGRESS NOTES
V Chemotherapy/Biotherapy Education:  Serge Uribe  is a 75y.o. year old female with breast cancer who presents for chemotherapy education for the following medication(s): Taxotere and Cytoxan    Schedule and frequency of chemotherapy were discussed with the patient and . The patient was given handouts published by the [facility] titled \"Chemotherapy and You\" and \"Eating Hints Before, During, and After Cancer Treatment\" for reference. Medication specific information was printed from [employer] and distributed to the patient. Self-care guidelines were distributed and discussed with the patient and included the followin) Potential long term and short term side effects of therapy including fertility risks for appropriate patients    2) Symptoms and side effects that require the patient to contact [address] [place] [address] or require immediate attention    3) Symptoms or events that require immediate discontinuation of oral or self-administered treatments    4) Procedures for handling medications at home, including storage, safe handling, and management of unused medications    5) Procedures for handling bodily fluids and waste in the home    6) The [address] [place] [address]'s contact information with availability and instructions on who and when to call    7) The [address] [place] [address] appointment policy and expectations for rescheduling or canceling    Patient denies any needs or referrals at this time. Prescriptions for zofran, compazine, and decadron have been sent electronically to the local pharmacy. Proper use and frequency of these meds were reviewed with patient and patient verbalized understanding of the treatment recommendations. Patient asked appropriate questions and was very involved and engaged during the educational session. There were no barriers to learning that were observed or demonstrated during the encounter.   Preference in learning style assessed as visual, written and verbal.  Patient acknowledged readiness to learn by responding appropriately to open ended questions about chemo education, disease process, treatment plan and possible side effects, etc.  Patient offered feedback on learning and the educational encounter. She acknowledges the importance of and agrees to adhering to the treatment plan regimen. Upcoming appointments were reviewed with the patient. Time was provided for the patient to ask questions. José Miguel Armando verbalized understanding of the treatment plan. Vitals:    Vitals:    23 1058   BP: 132/77   Pulse: 87   Resp: 17   Temp: 97.7 °F (36.5 °C)   SpO2: 94%       Informed Consent for Administration of Chemotherapy or Biotherapy for José Miguel Armando was signed and scanned into Epic under the Media tab. Total time spent for chemo education/counselin minutes, 100% in direct counseling.        Debby Stafford) [address] [place] [address] Hematology and Oncology  [address] [place] [address]  Office : [phone]  Fax : [phone]

## 2023-01-27 NOTE — PROGRESS NOTES
I spoke with Brooklynn Kam regarding her Medicare and [facility] insurance coverage, potential oral medication authorizations, enrollment in the [employer] ([employer]) and the [address] [place] [address] ([address]), and assistance organization resource sheet. Next, I spoke with Brooklynn Kam regarding her Medicare and [facility] coverage. The patient was given the [employer] sheet which displayed her insurance benefits. Next, I spoke with Brooklynn Kam regarding the Oncology Care Model Notification Letter. I answered questions regarding the costs associated with Medicare Benefits. I explained to Brooklynn Kam the estimated cost of treatment and services for six months under the [employer]. Brooklynn Kam was advised to contact Medicare or their healthcare provider for questions or concerns related to service of care. The Oncology Care Model provides different options of contact for Brooklynn Kam regarding concerns and complaints of treatments and services. Next, I spoke with Brooklynn Kam about billing questions and treatment services. We discussed copayments, deductibles, and out of pocket maximums. Next, I spoke with Brooklynn Kam regarding potential oral medication authorizations. I told her that if she ever had any problems getting her oral medications filled to give the dedicated [facility]  a call. Most of the time, it is simply an authorization that needs to be done with the insurance company. Lastly, I gave Brooklynn Kam a form with various resource organizations that could assist with specific needs (example:  transportation, lodging, preparing meals, home cleaning)      Brooklynn Kam expressed understanding of the information above and all questions were answered to her satisfaction.

## 2023-01-30 ENCOUNTER — OFFICE VISIT (OUTPATIENT)
Dept: ONCOLOGY | Age: 68
End: 2023-01-30
Payer: MEDICARE

## 2023-01-30 ENCOUNTER — HOSPITAL ENCOUNTER (OUTPATIENT)
Dept: LAB | Age: 68
Discharge: HOME | End: 2023-02-02
Payer: MEDICARE

## 2023-01-30 VITALS
RESPIRATION RATE: 16 BRPM | SYSTOLIC BLOOD PRESSURE: 110 MMHG | OXYGEN SATURATION: 94 % | HEIGHT: 62 IN | WEIGHT: 173.2 LBS | BODY MASS INDEX: 31.87 KG/M2 | HEART RATE: 92 BPM | TEMPERATURE: 98 F | DIASTOLIC BLOOD PRESSURE: 87 MMHG

## 2023-01-30 DIAGNOSIS — C50.811 MALIGNANT NEOPLASM OF OVERLAPPING SITES OF RIGHT BREAST IN FEMALE, ESTROGEN RECEPTOR POSITIVE (HCC): Primary | ICD-10-CM

## 2023-01-30 DIAGNOSIS — Z17.0 MALIGNANT NEOPLASM OF OVERLAPPING SITES OF RIGHT BREAST IN FEMALE, ESTROGEN RECEPTOR POSITIVE (HCC): Primary | ICD-10-CM

## 2023-01-30 DIAGNOSIS — C50.811 MALIGNANT NEOPLASM OF OVERLAPPING SITES OF RIGHT BREAST IN FEMALE, ESTROGEN RECEPTOR POSITIVE (HCC): ICD-10-CM

## 2023-01-30 DIAGNOSIS — Z17.0 MALIGNANT NEOPLASM OF OVERLAPPING SITES OF RIGHT BREAST IN FEMALE, ESTROGEN RECEPTOR POSITIVE (HCC): ICD-10-CM

## 2023-01-30 DIAGNOSIS — Z79.899 HIGH RISK MEDICATION USE: ICD-10-CM

## 2023-01-30 LAB
ALBUMIN SERPL-MCNC: 4.2 G/DL (ref 3.2–4.6)
ALBUMIN/GLOB SERPL: 1.2 (ref 0.4–1.6)
ALP SERPL-CCNC: 78 U/L (ref 50–136)
ALT SERPL-CCNC: 39 U/L (ref 12–65)
ANION GAP SERPL CALC-SCNC: 8 MMOL/L (ref 2–11)
AST SERPL-CCNC: 20 U/L (ref 15–37)
BASOPHILS # BLD: 0 K/UL (ref 0–0.2)
BASOPHILS NFR BLD: 0 % (ref 0–2)
BILIRUB SERPL-MCNC: 0.6 MG/DL (ref 0.2–1.1)
BUN SERPL-MCNC: 14 MG/DL (ref 8–23)
CALCIUM SERPL-MCNC: 9.3 MG/DL (ref 8.3–10.4)
CHLORIDE SERPL-SCNC: 106 MMOL/L (ref 101–110)
CO2 SERPL-SCNC: 26 MMOL/L (ref 21–32)
CREAT SERPL-MCNC: 0.9 MG/DL (ref 0.6–1)
DIFFERENTIAL METHOD BLD: ABNORMAL
EOSINOPHIL # BLD: 0 K/UL (ref 0–0.8)
EOSINOPHIL NFR BLD: 0 % (ref 0.5–7.8)
ERYTHROCYTE [DISTWIDTH] IN BLOOD BY AUTOMATED COUNT: 13.3 % (ref 11.9–14.6)
GLOBULIN SER CALC-MCNC: 3.6 G/DL (ref 2.8–4.5)
GLUCOSE SERPL-MCNC: 126 MG/DL (ref 65–100)
HCT VFR BLD AUTO: 45.2 % (ref 35.8–46.3)
HGB BLD-MCNC: 14.6 G/DL (ref 11.7–15.4)
IMM GRANULOCYTES # BLD AUTO: 0 K/UL (ref 0–0.5)
IMM GRANULOCYTES NFR BLD AUTO: 1 % (ref 0–5)
LYMPHOCYTES # BLD: 1.2 K/UL (ref 0.5–4.6)
LYMPHOCYTES NFR BLD: 20 % (ref 13–44)
MAGNESIUM SERPL-MCNC: 2.3 MG/DL (ref 1.8–2.4)
MCH RBC QN AUTO: 28 PG (ref 26.1–32.9)
MCHC RBC AUTO-ENTMCNC: 32.3 G/DL (ref 31.4–35)
MCV RBC AUTO: 86.8 FL (ref 82–102)
MONOCYTES # BLD: 0.2 K/UL (ref 0.1–1.3)
MONOCYTES NFR BLD: 3 % (ref 4–12)
NEUTS SEG # BLD: 4.6 K/UL (ref 1.7–8.2)
NEUTS SEG NFR BLD: 76 % (ref 43–78)
NRBC # BLD: 0 K/UL (ref 0–0.2)
PLATELET # BLD AUTO: 275 K/UL (ref 150–450)
PMV BLD AUTO: 8.8 FL (ref 9.4–12.3)
POTASSIUM SERPL-SCNC: 4.3 MMOL/L (ref 3.5–5.1)
PROT SERPL-MCNC: 7.8 G/DL (ref 6.3–8.2)
RBC # BLD AUTO: 5.21 M/UL (ref 4.05–5.2)
SODIUM SERPL-SCNC: 140 MMOL/L (ref 133–143)
WBC # BLD AUTO: 5.9 K/UL (ref 4.3–11.1)

## 2023-01-30 PROCEDURE — 99214 OFFICE O/P EST MOD 30 MIN: CPT | Performed by: INTERNAL MEDICINE

## 2023-01-30 PROCEDURE — 1036F TOBACCO NON-USER: CPT | Performed by: INTERNAL MEDICINE

## 2023-01-30 PROCEDURE — 3078F DIAST BP <80 MM HG: CPT | Performed by: INTERNAL MEDICINE

## 2023-01-30 PROCEDURE — 36415 COLL VENOUS BLD VENIPUNCTURE: CPT

## 2023-01-30 PROCEDURE — 80053 COMPREHEN METABOLIC PANEL: CPT

## 2023-01-30 PROCEDURE — G8399 PT W/DXA RESULTS DOCUMENT: HCPCS | Performed by: INTERNAL MEDICINE

## 2023-01-30 PROCEDURE — G8428 CUR MEDS NOT DOCUMENT: HCPCS | Performed by: INTERNAL MEDICINE

## 2023-01-30 PROCEDURE — G8484 FLU IMMUNIZE NO ADMIN: HCPCS | Performed by: INTERNAL MEDICINE

## 2023-01-30 PROCEDURE — G8417 CALC BMI ABV UP PARAM F/U: HCPCS | Performed by: INTERNAL MEDICINE

## 2023-01-30 PROCEDURE — 83735 ASSAY OF MAGNESIUM: CPT

## 2023-01-30 PROCEDURE — 1123F ACP DISCUSS/DSCN MKR DOCD: CPT | Performed by: INTERNAL MEDICINE

## 2023-01-30 PROCEDURE — 85025 COMPLETE CBC W/AUTO DIFF WBC: CPT

## 2023-01-30 PROCEDURE — 3017F COLORECTAL CA SCREEN DOC REV: CPT | Performed by: INTERNAL MEDICINE

## 2023-01-30 PROCEDURE — 1090F PRES/ABSN URINE INCON ASSESS: CPT | Performed by: INTERNAL MEDICINE

## 2023-01-30 PROCEDURE — 3074F SYST BP LT 130 MM HG: CPT | Performed by: INTERNAL MEDICINE

## 2023-01-30 RX ORDER — DIPHENHYDRAMINE HYDROCHLORIDE 50 MG/ML
50 INJECTION INTRAMUSCULAR; INTRAVENOUS
Status: CANCELLED | OUTPATIENT
Start: 2023-01-31

## 2023-01-30 RX ORDER — ONDANSETRON 2 MG/ML
8 INJECTION INTRAMUSCULAR; INTRAVENOUS
Status: CANCELLED | OUTPATIENT
Start: 2023-01-31

## 2023-01-30 RX ORDER — ONDANSETRON 2 MG/ML
8 INJECTION INTRAMUSCULAR; INTRAVENOUS ONCE
Status: CANCELLED | OUTPATIENT
Start: 2023-01-31 | End: 2023-01-31

## 2023-01-30 RX ORDER — HEPARIN SODIUM (PORCINE) LOCK FLUSH IV SOLN 100 UNIT/ML 100 UNIT/ML
500 SOLUTION INTRAVENOUS PRN
Status: CANCELLED | OUTPATIENT
Start: 2023-01-31

## 2023-01-30 RX ORDER — SODIUM CHLORIDE 9 MG/ML
5-40 INJECTION INTRAVENOUS PRN
Status: CANCELLED | OUTPATIENT
Start: 2023-01-31

## 2023-01-30 RX ORDER — ALBUTEROL SULFATE 90 UG/1
4 AEROSOL, METERED RESPIRATORY (INHALATION) PRN
Status: CANCELLED | OUTPATIENT
Start: 2023-01-31

## 2023-01-30 RX ORDER — ACETAMINOPHEN 325 MG/1
650 TABLET ORAL
Status: CANCELLED | OUTPATIENT
Start: 2023-01-31

## 2023-01-30 RX ORDER — SODIUM CHLORIDE 9 MG/ML
INJECTION, SOLUTION INTRAVENOUS CONTINUOUS
Status: CANCELLED | OUTPATIENT
Start: 2023-01-31

## 2023-01-30 RX ORDER — SODIUM CHLORIDE 9 MG/ML
5-250 INJECTION, SOLUTION INTRAVENOUS PRN
Status: CANCELLED | OUTPATIENT
Start: 2023-01-31

## 2023-01-30 RX ORDER — EPINEPHRINE 1 MG/ML
0.3 INJECTION, SOLUTION, CONCENTRATE INTRAVENOUS PRN
Status: CANCELLED | OUTPATIENT
Start: 2023-01-31

## 2023-01-30 RX ORDER — MEPERIDINE HYDROCHLORIDE 50 MG/ML
12.5 INJECTION INTRAMUSCULAR; INTRAVENOUS; SUBCUTANEOUS PRN
Status: CANCELLED | OUTPATIENT
Start: 2023-01-31

## 2023-01-30 RX ORDER — FAMOTIDINE 10 MG/ML
20 INJECTION, SOLUTION INTRAVENOUS
Status: CANCELLED | OUTPATIENT
Start: 2023-01-31

## 2023-01-30 ASSESSMENT — PATIENT HEALTH QUESTIONNAIRE - PHQ9
SUM OF ALL RESPONSES TO PHQ QUESTIONS 1-9: 0
2. FEELING DOWN, DEPRESSED OR HOPELESS: 0
3. TROUBLE FALLING OR STAYING ASLEEP: 0
SUM OF ALL RESPONSES TO PHQ QUESTIONS 1-9: 0

## 2023-01-30 NOTE — PATIENT INSTRUCTIONS
Patient Instructions from Today's Visit    Reason for Visit:  Breast Cancer Follow up     Diagnosis Information:  [email or web address]://[email or web address]/. net/about-us/asco-answers-patient-education-materials/eunq-mfbfzbo-uboo-sheets    Plan:  Discussed chemo education, medication regimen and possible side effects      Follow Up:   Follow up in  with labs prior    Recent Lab Results:  Hospital Outpatient Visit on 01/30/2023   Component Date Value Ref Range Status    WBC 01/30/2023 5.9  4.3 - 11.1 K/uL Final    RBC 01/30/2023 5.21 (A)  4.05 - 5.2 M/uL Final    Hemoglobin 01/30/2023 14.6  11.7 - 15.4 g/dL Final    Hematocrit 01/30/2023 45.2  35.8 - 46.3 % Final    MCV 01/30/2023 86.8  82.0 - 102.0 FL Final    MCH 01/30/2023 28.0  26.1 - 32.9 PG Final    MCHC 01/30/2023 32.3  31.4 - 35.0 g/dL Final    RDW 01/30/2023 13.3  11.9 - 14.6 % Final    Platelets 48/49/8394 275  150 - 450 K/uL Final    MPV 01/30/2023 8.8 (A)  9.4 - 12.3 FL Final    nRBC 01/30/2023 0.00  0.0 - 0.2 K/uL Final    **Note: Absolute NRBC parameter is now reported with Hemogram**    Seg Neutrophils 01/30/2023 76  43 - 78 % Final    Lymphocytes 01/30/2023 20  13 - 44 % Final    Monocytes 01/30/2023 3 (A)  4.0 - 12.0 % Final    Eosinophils % 01/30/2023 0 (A)  0.5 - 7.8 % Final    Basophils 01/30/2023 0  0.0 - 2.0 % Final    Immature Granulocytes 01/30/2023 1  0.0 - 5.0 % Final    Segs Absolute 01/30/2023 4.6  1.7 - 8.2 K/UL Final    Absolute Lymph # 01/30/2023 1.2  0.5 - 4.6 K/UL Final    Absolute Mono # 01/30/2023 0.2  0.1 - 1.3 K/UL Final    Absolute Eos # 01/30/2023 0.0  0.0 - 0.8 K/UL Final    Basophils Absolute 01/30/2023 0.0  0.0 - 0.2 K/UL Final    Absolute Immature Granulocyte 01/30/2023 0.0  0.0 - 0.5 K/UL Final    Differential Type 01/30/2023 AUTOMATED    Final    Sodium 01/30/2023 140  133 - 143 mmol/L Final    Potassium 01/30/2023 4.3  3.5 - 5.1 mmol/L Final    Chloride 01/30/2023 106  101 - 110 mmol/L Final    CO2 01/30/2023 26  21 - 32 mmol/L Final    Anion Gap 01/30/2023 8  2 - 11 mmol/L Final    Glucose 01/30/2023 126 (A)  65 - 100 mg/dL Final    BUN 01/30/2023 14  8 - 23 MG/DL Final    Creatinine 01/30/2023 0.90  0.6 - 1.0 MG/DL Final    Est, Glom Filt Rate 01/30/2023 >60  >60 ml/min/1.73m2 Final    Comment:      Pediatric calculator link: [email or web address].at. org/professionals/kdoqi/gfr_calculatorped       These results are not intended for use in patients <25years of age. eGFR results are calculated without a race factor using  the 2021 CKD-EPI equation. Careful clinical correlation is recommended, particularly when comparing to results calculated using previous equations. The CKD-EPI equation is less accurate in patients with extremes of muscle mass, extra-renal metabolism of creatinine, excessive creatine ingestion, or following therapy that affects renal tubular secretion. Calcium 01/30/2023 9.3  8.3 - 10.4 MG/DL Final    Total Bilirubin 01/30/2023 0.6  0.2 - 1.1 MG/DL Final    ALT 01/30/2023 39  12 - 65 U/L Final    AST 01/30/2023 20  15 - 37 U/L Final    Alk Phosphatase 01/30/2023 78  50 - 136 U/L Final    Total Protein 01/30/2023 7.8  6.3 - 8.2 g/dL Final    Albumin 01/30/2023 4.2  3.2 - 4.6 g/dL Final    Globulin 01/30/2023 3.6  2.8 - 4.5 g/dL Final    Albumin/Globulin Ratio 01/30/2023 1.2  0.4 - 1.6   Final    Magnesium 01/30/2023 2.3  1.8 - 2.4 mg/dL Final     Treatment Summary has been discussed and given to patient: n/a    -------------------------------------------------------------------------------------------------------------------  Please call our office at [phone] if you have any  of the following symptoms:   Fever of 100.5 or greater  Chills  Shortness of breath  Swelling or pain in one leg    After office hours an answering service is available and will contact a provider for emergencies or if you are experiencing any of the above symptoms. Patient does express an interest in My Chart.   My Chart log in information explained on the after visit summary printout at the [facility] desk.     Gilmer Solano RN

## 2023-01-30 NOTE — PROGRESS NOTES
[facility] Hematology and Oncology: Office Visit Established Patient    Chief Complaint:    Chief Complaint   Patient presents with    Follow-up         History of Present Illness:  Ms. Melisa Ortiz is a 75 y.o. female who presents today for follow-up regarding breast cancer. She initially presented for a routine bilateral screening mammogram on 9/9/22 which identified a right posterocentral outer mid breast mass and associated microcalcifications. Further evaluation with right breast diagnostic mammogram on 9/27/2022 showed mass-like density and associated calcifications persisted in the outer right breast demonstrating suspicious features with spiculated margins and suggested architectural distortion. Right breast ultrasound also performed on 9/27/22 confirmed a hypoechoic shadowing mass at the 9 o'clock position of the right breast measuring 2.7 cm x 3 cm x 1.9 cm demonstrating multiple highly suspicious features. Recommended ultrasound-guided biopsy of the right breast lesion was performed on 10/5/22 with pathology revealing ER 98%/WV 92% positive, HER-2 (0) negative, high grade infiltrating ductal carcinoma. MRI of the bilateral breasts as completed on 10/19/22 demonstrating right 9:00 breast cancer measuring up to 6.4 cm by MRI, with no suspicious left breast finding and no obvious lymphadenopathy. She was referred to [facility] for Medical Oncology evaluation and treatment. Normally we would recommend upfront surgery for a tumor with strong HR expression and negative HER2, but the size of the tumor on MRI suggests that she cannot have breast conserving surgery unless she has some cytoreduction prior. If she does strongly desire BCT and it is not feasible with her current tumor dimensions, I would recommend AC-T for neoadjuvant therapy.   On the other hand, if she is willing to consider mastectomy, I would favor upfront surgery as the questionable tumor dimensions, the clinically negative nodes, and the strong HR expression may indicate that gene recurrence testing would be of benefit and chemotherapy unnecessary. Ultimately, she opted for upfront mastectomy, which shows the tumor to be 4.2 cm with 3 negative sentinel nodes, pT2pN0. Because the tumor was under 4.5 cm we recommended Oncotype Dx, which returned at 25. Although this was technically in the range included in [address], her clinicopathologic features including the size and grade are concerning. Indeed, when entered into RSClin, her risk of recurrence is 35% with a reduction from chemotherapy of 14%. This is well above the threshold for appropriateness of chemotherapy, I would recommend TC for 4 cycles. This would be followed by adjuvant endocrine therapy, she does not require radiation. Here for cycle 1 TC. She is doing well, no complaints today. Ready to start treatment, scheduled for tomorrow. She began her dex today and she has her other pre-meds in hand as well. Review of Systems:  Constitutional: Negative. HENT: Negative. Eyes: Negative. Respiratory: Negative. Cardiovascular: Negative. Gastrointestinal: Negative. Genitourinary: Negative. Musculoskeletal: Negative. Skin: Negative. Neurological: Negative. Endo/Heme/Allergies: Negative. Psychiatric/Behavioral: Negative. All other systems reviewed and are negative.      No Known Allergies  Past Medical History:   Diagnosis Date    Cat scratch fever 1973    Elevated liver function tests     Hyperlipidemia     managed with medication    Hypertension     managed with medication    Malignant neoplasm of overlapping sites of right breast in female, estrogen receptor positive ([facility] [address] 2022    Renal insufficiency      Past Surgical History:   Procedure Laterality Date    BREAST BIOPSY Right 12/20/2022    RIGHT SENTINEL NODE BIOPSY  performed by Shubham Mosqueda MD at [facility] Right 12/20/2022    RIGHT BREAST MASTECTOMY performed by Shubham Mosqueda MD at [address]      right lymph node excision - neck    US BREAST BIOPSY W LOC DEVICE 1ST LESION RIGHT Right 10/05/2022    US BREAST NEEDLE BIOPSY RIGHT 10/5/2022 Kyle Michelle MD [facility]     Family History   Problem Relation Age of Onset    Thyroid Disease Mother     Heart Disease Mother     High Blood Pressure Father     Heart Disease Father     Thyroid Disease Father     Cancer Sister 48        lung    Diabetes Neg Hx      Social History     Socioeconomic History    Marital status:      Spouse name: Not on file    Number of children: Not on file    Years of education: Not on file    Highest education level: Not on file   Occupational History    Not on file   Tobacco Use    Smoking status: Never    Smokeless tobacco: Never   Vaping Use    Vaping Use: Never used   Substance and Sexual Activity    Alcohol use: Not Currently     Comment: Socially    Drug use: Never    Sexual activity: Not on file   Other Topics Concern    Not on file   Social History Narrative    Not on file     Social Determinants of Health     Financial Resource Strain: Low Risk     Difficulty of Paying Living Expenses: Not very hard   Food Insecurity: No Food Insecurity    Worried About Running Out of Food in the Last Year: Never true    Ran Out of Food in the Last Year: Never true   Transportation Needs: No Transportation Needs    Lack of Transportation (Medical): No    Lack of Transportation (Non-Medical):  No   Physical Activity: Inactive    Days of Exercise per Week: 0 days    Minutes of Exercise per Session: 0 min   Stress: No Stress Concern Present    Feeling of Stress : Not at all   Social Connections: Unknown    Frequency of Communication with Friends and Family: More than three times a week    Frequency of Social Gatherings with Friends and Family: More than three times a week    Attends Islam Services: Not on file    Active Member of Clubs or Organizations: Not on file    Attends [employer] or Organization Meetings: Not on file    Marital Status:    Intimate Partner Violence: Not At Risk    Fear of Current or Ex-Partner: No    Emotionally Abused: No    Physically Abused: No    Sexually Abused: No   Housing Stability: Unknown    Unable to Pay for Housing in the Last Year: No    Number of Places Lived in the Last Year: Not on file    Unstable Housing in the Last Year: No     Current Outpatient Medications   Medication Sig Dispense Refill    ondansetron (ZOFRAN) 4 MG tablet Take 1 tablet by mouth 3 times daily as needed for Nausea or Vomiting 30 tablet 0    prochlorperazine (COMPAZINE) 10 MG tablet Take 1 tablet by mouth every 6 hours as needed (nausea) 120 tablet 0    lidocaine-prilocaine (EMLA) 2.5-2.5 % cream Apply topically as needed. 30 g 0    acetaminophen (TYLENOL) 325 MG tablet Take 2 tablets by mouth every 4 hours as needed for Pain (over the counter medication. May take for pain or alternate with Ibuprofen) 1 tablet 0    ibuprofen (ADVIL;MOTRIN) 600 MG tablet Take 1 tablet by mouth 4 times daily as needed for Pain (May alternate with Tylenol for pain. OVER THE COUNTER MEDICATION) 1 tablet 0    lisinopril (PRINIVIL;ZESTRIL) 10 MG tablet Take 1 tablet by mouth daily 90 tablet 1    Multiple Vitamins-Minerals (MULTIVITAMIN ADULTS 50+ PO) Take by mouth at bedtime      Omega-3 Fatty Acids (FISH OIL) 1200 MG CAPS Take by mouth at bedtime      rosuvastatin (CRESTOR) 20 MG tablet Take 1 tablet by mouth nightly 90 tablet 3    ondansetron (ZOFRAN-ODT) 4 MG disintegrating tablet Take 1 tablet by mouth 3 times daily as needed for Nausea or Vomiting (Patient not taking: No sig reported) 21 tablet 0    acetaminophen (TYLENOL) 500 MG tablet Take 1,000 mg by mouth every 6 hours as needed for Pain (Patient not taking: No sig reported)       No current facility-administered medications for this visit.        OBJECTIVE:  /87 Comment: standing  Pulse 92   Temp 98 °F (36.7 °C) (Oral)   Resp 16   Ht 5' 2\" (1.575 m)   Wt 173 lb 3.2 oz (78.6 kg)   SpO2 94%   BMI 31.68 kg/m²     Physical Exam:  Constitutional: Well developed, well nourished female in no acute distress, sitting comfortably in the exam room chair. HEENT: Normocephalic and atraumatic. Oropharynx is clear, mucous membranes are moist.  Sclerae anicteric. Neck supple without JVD. No thyromegaly present. Lymph node   No palpable submandibular, cervical, supraclavicular, axillary lymph nodes. Skin Warm and dry. No bruising and no rash noted. No erythema. No pallor. Respiratory Lungs are clear to auscultation bilaterally without wheezes, rales or rhonchi, normal air exchange without accessory muscle use. CVS Normal rate, regular rhythm and normal S1 and S2. No murmurs, gallops, or rubs. Abdomen Soft, nontender and nondistended, normoactive bowel sounds. No palpable mass. No hepatosplenomegaly. Neuro Grossly nonfocal with no obvious sensory or motor deficits. MSK Normal range of motion in general.  No edema and no tenderness. Psych Appropriate mood and affect.         Labs:  Recent Results (from the past 24 hour(s))   CBC with Auto Differential    Collection Time: 01/30/23 12:45 PM   Result Value Ref Range    WBC 5.9 4.3 - 11.1 K/uL    RBC 5.21 (H) 4.05 - 5.2 M/uL    Hemoglobin 14.6 11.7 - 15.4 g/dL    Hematocrit 45.2 35.8 - 46.3 %    MCV 86.8 82.0 - 102.0 FL    MCH 28.0 26.1 - 32.9 PG    MCHC 32.3 31.4 - 35.0 g/dL    RDW 13.3 11.9 - 14.6 %    Platelets 552 904 - 141 K/uL    MPV 8.8 (L) 9.4 - 12.3 FL    nRBC 0.00 0.0 - 0.2 K/uL    Seg Neutrophils 76 43 - 78 %    Lymphocytes 20 13 - 44 %    Monocytes 3 (L) 4.0 - 12.0 %    Eosinophils % 0 (L) 0.5 - 7.8 %    Basophils 0 0.0 - 2.0 %    Immature Granulocytes 1 0.0 - 5.0 %    Segs Absolute 4.6 1.7 - 8.2 K/UL    Absolute Lymph # 1.2 0.5 - 4.6 K/UL    Absolute Mono # 0.2 0.1 - 1.3 K/UL    Absolute Eos # 0.0 0.0 - 0.8 K/UL    Basophils Absolute 0.0 0.0 - 0.2 K/UL    Absolute Immature Granulocyte 0.0 0.0 - 0.5 K/UL    Differential Type AUTOMATED     Comprehensive Metabolic Panel    Collection Time: 01/30/23 12:45 PM   Result Value Ref Range    Sodium 140 133 - 143 mmol/L    Potassium 4.3 3.5 - 5.1 mmol/L    Chloride 106 101 - 110 mmol/L    CO2 26 21 - 32 mmol/L    Anion Gap 8 2 - 11 mmol/L    Glucose 126 (H) 65 - 100 mg/dL    BUN 14 8 - 23 MG/DL    Creatinine 0.90 0.6 - 1.0 MG/DL    Est, Glom Filt Rate >60 >60 ml/min/1.73m2    Calcium 9.3 8.3 - 10.4 MG/DL    Total Bilirubin 0.6 0.2 - 1.1 MG/DL    ALT 39 12 - 65 U/L    AST 20 15 - 37 U/L    Alk Phosphatase 78 50 - 136 U/L    Total Protein 7.8 6.3 - 8.2 g/dL    Albumin 4.2 3.2 - 4.6 g/dL    Globulin 3.6 2.8 - 4.5 g/dL    Albumin/Globulin Ratio 1.2 0.4 - 1.6     Magnesium    Collection Time: 01/30/23 12:45 PM   Result Value Ref Range    Magnesium 2.3 1.8 - 2.4 mg/dL       Imaging:  MRI BREAST BILATERAL W WO CONTRAST    Result Date: 10/20/2022  MRI of the Breasts with and without contrast CLINICAL INDICATION:  New diagnosis of right breast 9:00 infiltrating ductal carcinoma high grade undergoing evaluation for extent of disease, staging, therapy planning. No previous personal malignancy or breast surgery in the past otherwise. COMPARISON: Ultrasound and mammography 10/5/2022, 9/27/2022, 9/9/2022 TECHNIQUE: Standard MRI sequences were obtained through the breasts in multiple planes. Images were obtained before and after intravenous infusion of 17 mL of Prohance contrast. Images were reviewed with PACS and with [employer] CAD software. FINDINGS: The breasts demonstrate moderate glandularity and mild background enhancement. Right breast: An 9:00 midposterior depth are biopsy changes within the irregular heterogeneously enhancing malignant mass measuring up to 4.0 x 2.6 x 3.3 cm. Anterior to the mass (within 1.5 cm of the anterior margin) system are 2 tiny 3 mm satellite nodules. Overall the MRI area of concern measures up to 6.4 cm AP.  Note that on recent mammography, malignant-type calcifications extending anteriorly and posteriorly from the mass for total AP extent of at least 8 cm. Left breast: No evidence of suspicious enhancing mass, and no dominant or unique nonmass enhancement, to suggest malignancy. Lymph nodes: No obvious axillary or internal mammary lymphadenopathy. Several bilateral axillary lymph nodes are relatively symmetric in size and number, all demonstrating preservation of expected reniform shape with fatty grant. Elsewhere colon limited inclusion of the thorax and upper abdomen shows a partially visualized hiatal hernia which is not entirely seen or evaluated here. 1. Right 9:00 breast cancer measures up to 6.4 cm by MRI (recent mammography demonstrated at least 8 cm of expected disease). 2. No suspicious left breast finding. 3. No obvious lymphadenopathy. Recommend continued malignancy management as directed clinically. BI-RADS Assessment Category 6: Known Biopsy Proven Malignancy     ZIA DIGITAL DIAGNOSTIC W OR WO CAD RIGHT    Result Date: 9/27/2022  RIGHT BREAST DIAGNOSTIC MAMMOGRAM and RIGHT BREAST ULTRASOUND, 9/27/2022. CLINICAL HISTORY: Abnormal screening mammogram. COMPARISON STUDY: Screening mammogram 9/9/2020. FINDINGS: RIGHT BREAST DIAGNOSTIC MAMMOGRAM: Straight mediolateral view of the right breast as well as compression images of the right breast in the CC, and MLO plane are submitted for evaluation. Masslike density and associated calcifications persist in the outer right breast. These demonstrate suspicious features with spiculated margins and suggest architectural distortion. Additional associated calcifications also suspicious. Further characterization with focused diagnostic ultrasound is recommended. RIGHT BREAST ULTRASOUND, 9/27/2022. CLINICAL HISTORY: Abnormal screening and diagnostic mammogram. Technique: Grayscale, and Doppler imaging of the right breast soft tissues was performed using a 15 MHz transducer.  FINDINGS: Focused ultrasound imaging at the 9 o'clock position of the right breast in the area of masslike density as seen on prior mammogram imaging shows a hypoechoic shadowing mass measuring 2.7 cm x 3 cm x 1.9 cm demonstrating multiple highly suspicious features such as irregular margins, shadowing, and a vertical configuration for which a benign process cannot be confirmed. 1. Highly suspicious right breast mass. Further evaluation with ultrasound-guided biopsy is recommended. BI-RADS Assessment Category 5: Highly suggestive of malignancy- Appropriate action should be taken. US BREAST LIMITED RIGHT    Result Date: 9/27/2022  RIGHT BREAST DIAGNOSTIC MAMMOGRAM and RIGHT BREAST ULTRASOUND, 9/27/2022. CLINICAL HISTORY: Abnormal screening mammogram. COMPARISON STUDY: Screening mammogram 9/9/2020. FINDINGS: RIGHT BREAST DIAGNOSTIC MAMMOGRAM: Straight mediolateral view of the right breast as well as compression images of the right breast in the CC, and MLO plane are submitted for evaluation. Masslike density and associated calcifications persist in the outer right breast. These demonstrate suspicious features with spiculated margins and suggest architectural distortion. Additional associated calcifications also suspicious. Further characterization with focused diagnostic ultrasound is recommended. RIGHT BREAST ULTRASOUND, 9/27/2022. CLINICAL HISTORY: Abnormal screening and diagnostic mammogram. Technique: Grayscale, and Doppler imaging of the right breast soft tissues was performed using a 15 MHz transducer. FINDINGS: Focused ultrasound imaging at the 9 o'clock position of the right breast in the area of masslike density as seen on prior mammogram imaging shows a hypoechoic shadowing mass measuring 2.7 cm x 3 cm x 1.9 cm demonstrating multiple highly suspicious features such as irregular margins, shadowing, and a vertical configuration for which a benign process cannot be confirmed.      1. Highly suspicious right breast mass. Further evaluation with ultrasound-guided biopsy is recommended. BI-RADS Assessment Category 5: Highly suggestive of malignancy- Appropriate action should be taken. MAMMOGRAM POST BX CLIP PLACEMENT RIGHT    Result Date: 10/5/2022  POSTBIOPSY MAMMOGRAM, 10/5/2022. CLINICAL HISTORY: Status post percutaneous biopsy. FINDINGS: CC, ML views of the right breast demonstrate the biopsy clip in the outer soft tissues of the right breast. No significant post biopsy hematoma is seen. There has been successful placement of the biopsy clip occurring within the more lateral aspect of the indeterminate mass. 1. Successful biopsy and placement of marker clip. This is a post biopsy mammogram and does not require BI-RADS categorization. US BREAST BIOPSY W LOC DEVICE 1ST LESION RIGHT    Addendum Date: 10/12/2022    Addendum: Diaz Murray, accession number: [id number] Date: 10/5/2022 Pathology was noted as A: Right breast, 9:00 position, 12 cm from nipple, core biopsy: Infiltrating ductal carcinoma, high grade (poorly differentiated). Definite in situ component and lymphovascular invasion are not identified. Results concordant with imaging. Pathology report was faxed to  [address] [place] [address]'s office on 10/6/2022 by Layvonne Runner, RT(R)(EVELIN). Patient was referred to Oncology services on 10/6/2022. Result Date: 10/12/2022  Ultrasound-guided right breast biopsy, 10/5/2022. CLINICAL HISTORY: Right breast lesion. PROCEDURE: After obtaining written informed consent, the patient was placed in a supine position on the ultrasound table. Preliminary imaging demonstrates the 2.5 cm x 2.5 cm x 2.4 cm hypoechoic lesion at the 9 o'clock position of the right breast. The right breast was prepped and draped in the usual sterile fashion. Lidocaine was used for local anesthesia. Using ultrasound guidance, a 14-gauge Assure needle was positioned just proximal to the lesion with the sampling trough subsequently placed through the lesion.  A total of 5 samples were taken with ultrasound documentation of each pass. A biopsy clip was placed at the site of biopsy. The biopsy apparatus was removed and hemostasis was achieved. No procedure or immediate postprocedure complications were noted. The patient left the department in good condition. 1. Successful biopsy of right breast lesion with histologic results pending. Pathology:  DIAGNOSIS        A:  \"RIGHT BREAST\":  POORLY DIFFERENTIATED (3 + 2 + 3), 4.2 CM IN   GREATEST DIMENSION, MARGINS UNINVOLVED; FIBROCYSTIC MASTOPATHY;   INTRADUCTAL PAPILLOMA. B:  \"RIGHT AXILLARY SENTINEL NODES\":  THREE BENIGN LYMPH NODES, ALL   NEGATIVE FOR TUMOR.           tls/12/22/2022     Sign Out Date: 12/22/2022  Iliana Leslie MD       Procedures/Addenda                     STF - ONCOTYPE DX ASSAY                                                                                                                                         Status:  Signed Out    Yaya Leslie MD on 1/6/2023                                 Interpretation                       Oncotype DX Breast Recurrence Score:  25       DIAGNOSIS        A:  \" RIGHT BREAST, 9:00 POSITION, 12 CM FROM NIPPLE, CORE BIOPSY\":         INFILTRATING DUCTAL CARCINOMA, HIGH GRADE (POORLY DIFFERENTIATED).    DEFINITE IN SITU COMPONENT AND LYMPHOVASCULAR INVASION ARE NOT IDENTIFIED             Procedures/Addenda                     STF- ER/VT/KFU3DCX BY IHC                                                                                                                                         Status:  Signed Out    Basim Maya MD on 10/10/2022                                 Interpretation                       Nitronex IHC Quantitative Breast Panel Result: A1     TEST NAME:                         RESULTS:               INTERNAL   CONTROLS:     ESTROGEN RECEPTOR:               Positive (98%)               Present,   positive PROGESTERONE RECEPTOR:          Positive (92%)               Present,   positive   HER-2/SHARDA:                         Negative (0)               Percentage   of Cells with Uniform        Intense Complete Membrane   Stainin%                   ASSESSMENT:   Diagnosis Orders   1. Malignant neoplasm of overlapping sites of right breast in female, estrogen receptor positive [facility])              Patient Active Problem List   Diagnosis    Elevated liver function tests    Dyslipidemia    Weight gain    Renal insufficiency    Malignant neoplasm of overlapping sites of right breast in female, estrogen receptor positive ([facility] [address]    Primary hypertension    Ductal carcinoma in situ of right breast    Breast cancer in female [facility])           PLAN:  Lab studies and imaging studies were personally reviewed. Pertinent old records were reviewed. Breast cancer: 3cm by ultrasound but up to 6.4 cm on MRI (including two tiny satellite nodules), high grade, ER/ND strongly positive, HER2 0. Upfront mastectomy shows the tumor to be 4.2 cm with 3 negative sentinel nodes, pT2pN0. Because the tumor was under 4.5 cm we recommended Oncotype Dx, which returned at 25. Although this was technically in the range included in [address], her clinicopathologic features including the size and grade are concerning. Indeed, when entered into RSClin, her risk of recurrence is 35% with a reduction from chemotherapy of 14%. This is well above the threshold for appropriateness of chemotherapy, I would recommend TC for 4 cycles. This would be followed by adjuvant endocrine therapy, she does not require radiation. Here for cycle 1 TC. She is doing well, no complaints today. Ready to start treatment, scheduled for tomorrow. She began her dex today and she has her other pre-meds in hand as well. Labs reviewed and unremarkable. Continue with cycle 1 TC as planned, potential side effects again reviewed and she agrees to proceed.   All questions were asked and answered to the best of my ability. F/u for cycle 2 TC in 3 weeks.              Natan Avalos MD, MD  [address] [place] [address] Hematology and Oncology  [address] [place] [address]  Office : [phone]  Fax : [phone]

## 2023-01-31 ENCOUNTER — HOSPITAL ENCOUNTER (OUTPATIENT)
Dept: INFUSION THERAPY | Age: 68
Discharge: HOME | End: 2023-01-31
Payer: MEDICARE

## 2023-01-31 VITALS
SYSTOLIC BLOOD PRESSURE: 139 MMHG | RESPIRATION RATE: 16 BRPM | WEIGHT: 175.6 LBS | HEART RATE: 96 BPM | BODY MASS INDEX: 32.12 KG/M2 | TEMPERATURE: 97.7 F | DIASTOLIC BLOOD PRESSURE: 72 MMHG | OXYGEN SATURATION: 96 %

## 2023-01-31 DIAGNOSIS — Z17.0 MALIGNANT NEOPLASM OF OVERLAPPING SITES OF RIGHT BREAST IN FEMALE, ESTROGEN RECEPTOR POSITIVE (HCC): Primary | ICD-10-CM

## 2023-01-31 DIAGNOSIS — C50.811 MALIGNANT NEOPLASM OF OVERLAPPING SITES OF RIGHT BREAST IN FEMALE, ESTROGEN RECEPTOR POSITIVE (HCC): Primary | ICD-10-CM

## 2023-01-31 PROCEDURE — 96417 CHEMO IV INFUS EACH ADDL SEQ: CPT

## 2023-01-31 PROCEDURE — 96413 CHEMO IV INFUSION 1 HR: CPT

## 2023-01-31 PROCEDURE — 96375 TX/PRO/DX INJ NEW DRUG ADDON: CPT

## 2023-01-31 PROCEDURE — 2580000003 HC RX 258: Performed by: INTERNAL MEDICINE

## 2023-01-31 PROCEDURE — 6360000002 HC RX W HCPCS: Performed by: INTERNAL MEDICINE

## 2023-01-31 RX ORDER — ALBUTEROL SULFATE 90 UG/1
4 AEROSOL, METERED RESPIRATORY (INHALATION) PRN
Status: DISCONTINUED | OUTPATIENT
Start: 2023-01-31 | End: 2023-02-01 | Stop reason: HOSPADM

## 2023-01-31 RX ORDER — EPINEPHRINE 1 MG/ML
0.3 INJECTION, SOLUTION, CONCENTRATE INTRAVENOUS PRN
Status: DISCONTINUED | OUTPATIENT
Start: 2023-01-31 | End: 2023-02-01 | Stop reason: HOSPADM

## 2023-01-31 RX ORDER — ONDANSETRON 2 MG/ML
8 INJECTION INTRAMUSCULAR; INTRAVENOUS ONCE
Status: COMPLETED | OUTPATIENT
Start: 2023-01-31 | End: 2023-01-31

## 2023-01-31 RX ORDER — ONDANSETRON 2 MG/ML
8 INJECTION INTRAMUSCULAR; INTRAVENOUS
Status: DISCONTINUED | OUTPATIENT
Start: 2023-01-31 | End: 2023-02-01 | Stop reason: HOSPADM

## 2023-01-31 RX ORDER — ACETAMINOPHEN 325 MG/1
650 TABLET ORAL
Status: DISCONTINUED | OUTPATIENT
Start: 2023-01-31 | End: 2023-02-01 | Stop reason: HOSPADM

## 2023-01-31 RX ORDER — SODIUM CHLORIDE 9 MG/ML
INJECTION, SOLUTION INTRAVENOUS CONTINUOUS
Status: DISCONTINUED | OUTPATIENT
Start: 2023-01-31 | End: 2023-02-01 | Stop reason: HOSPADM

## 2023-01-31 RX ORDER — DIPHENHYDRAMINE HYDROCHLORIDE 50 MG/ML
50 INJECTION INTRAMUSCULAR; INTRAVENOUS
Status: DISCONTINUED | OUTPATIENT
Start: 2023-01-31 | End: 2023-02-01 | Stop reason: HOSPADM

## 2023-01-31 RX ORDER — SODIUM CHLORIDE 9 MG/ML
5-40 INJECTION INTRAVENOUS PRN
Status: DISCONTINUED | OUTPATIENT
Start: 2023-01-31 | End: 2023-02-01 | Stop reason: HOSPADM

## 2023-01-31 RX ORDER — SODIUM CHLORIDE 9 MG/ML
5-250 INJECTION, SOLUTION INTRAVENOUS PRN
Status: DISCONTINUED | OUTPATIENT
Start: 2023-01-31 | End: 2023-02-01 | Stop reason: HOSPADM

## 2023-01-31 RX ADMIN — CYCLOPHOSPHAMIDE 1120 MG: 1 INJECTION, POWDER, FOR SOLUTION INTRAVENOUS; ORAL at 11:16

## 2023-01-31 RX ADMIN — SODIUM CHLORIDE, PRESERVATIVE FREE 10 ML: 5 INJECTION INTRAVENOUS at 08:40

## 2023-01-31 RX ADMIN — DEXAMETHASONE SODIUM PHOSPHATE 12 MG: 4 INJECTION, SOLUTION INTRAMUSCULAR; INTRAVENOUS at 09:15

## 2023-01-31 RX ADMIN — DOCETAXEL ANHYDROUS 140 MG: 10 INJECTION, SOLUTION INTRAVENOUS at 09:55

## 2023-01-31 RX ADMIN — SODIUM CHLORIDE 100 ML/HR: 9 INJECTION, SOLUTION INTRAVENOUS at 08:45

## 2023-01-31 RX ADMIN — ONDANSETRON 8 MG: 2 INJECTION INTRAMUSCULAR; INTRAVENOUS at 08:50

## 2023-01-31 NOTE — PROGRESS NOTES
Arrived to the [facility]. Taxotere and Cytoxan completed. Patient tolerated without reaction or difficulty. Any issues or concerns during appointment: None. Patient aware of next infusion appointment on 02//01/2023 (date) at 1600 (time). Patient instructed to call provider with temperature of 100.4 or greater or nausea/vomiting/ diarrhea or pain not controlled by medications  Discharged Ambulatory to home with .

## 2023-02-01 ENCOUNTER — HOSPITAL ENCOUNTER (OUTPATIENT)
Dept: INFUSION THERAPY | Age: 68
Discharge: HOME | End: 2023-02-01
Payer: MEDICARE

## 2023-02-01 VITALS
SYSTOLIC BLOOD PRESSURE: 135 MMHG | OXYGEN SATURATION: 97 % | RESPIRATION RATE: 16 BRPM | TEMPERATURE: 97.4 F | HEART RATE: 66 BPM | DIASTOLIC BLOOD PRESSURE: 87 MMHG

## 2023-02-01 DIAGNOSIS — Z17.0 MALIGNANT NEOPLASM OF OVERLAPPING SITES OF RIGHT BREAST IN FEMALE, ESTROGEN RECEPTOR POSITIVE (HCC): Primary | ICD-10-CM

## 2023-02-01 DIAGNOSIS — C50.811 MALIGNANT NEOPLASM OF OVERLAPPING SITES OF RIGHT BREAST IN FEMALE, ESTROGEN RECEPTOR POSITIVE (HCC): Primary | ICD-10-CM

## 2023-02-01 PROCEDURE — 6360000002 HC RX W HCPCS: Performed by: INTERNAL MEDICINE

## 2023-02-01 PROCEDURE — 96372 THER/PROPH/DIAG INJ SC/IM: CPT

## 2023-02-01 RX ADMIN — PEGFILGRASTIM 6 MG: 6 INJECTION SUBCUTANEOUS at 16:18

## 2023-02-01 NOTE — PROGRESS NOTES
[facility] Juan completed. Provided education on Fulphila. Patient instructed to report any side effects to ordering provider. Patient tolerated well. Any issues or concerns during appointment: none. Patient aware of next infusion appointment on 2/21/23 (date) at 8:30 AM (time). Discharged ambulatory.

## 2023-02-06 ENCOUNTER — OFFICE VISIT (OUTPATIENT)
Dept: ONCOLOGY | Age: 68
End: 2023-02-06

## 2023-02-06 ENCOUNTER — HOSPITAL ENCOUNTER (OUTPATIENT)
Dept: INFUSION THERAPY | Age: 68
Discharge: HOME | End: 2023-02-06
Payer: MEDICARE

## 2023-02-06 ENCOUNTER — HOSPITAL ENCOUNTER (OUTPATIENT)
Dept: LAB | Age: 68
Discharge: HOME | End: 2023-02-09
Payer: MEDICARE

## 2023-02-06 ENCOUNTER — HOSPITAL ENCOUNTER (INPATIENT)
Age: 68
LOS: 2 days | Discharge: HOME | End: 2023-02-08
Attending: INTERNAL MEDICINE | Admitting: INTERNAL MEDICINE
Payer: MEDICARE

## 2023-02-06 ENCOUNTER — CLINICAL DOCUMENTATION (OUTPATIENT)
Dept: ONCOLOGY | Age: 68
End: 2023-02-06

## 2023-02-06 ENCOUNTER — APPOINTMENT (OUTPATIENT)
Dept: GENERAL RADIOLOGY | Age: 68
End: 2023-02-06
Attending: INTERNAL MEDICINE
Payer: MEDICARE

## 2023-02-06 VITALS
TEMPERATURE: 102.4 F | OXYGEN SATURATION: 97 % | DIASTOLIC BLOOD PRESSURE: 81 MMHG | WEIGHT: 172.1 LBS | HEIGHT: 62 IN | RESPIRATION RATE: 14 BRPM | BODY MASS INDEX: 31.67 KG/M2 | HEART RATE: 113 BPM | SYSTOLIC BLOOD PRESSURE: 127 MMHG

## 2023-02-06 DIAGNOSIS — D70.9 NEUTROPENIC FEVER (HCC): ICD-10-CM

## 2023-02-06 DIAGNOSIS — R50.81 NEUTROPENIC FEVER (HCC): Primary | ICD-10-CM

## 2023-02-06 DIAGNOSIS — D70.9 NEUTROPENIC FEVER (HCC): Primary | ICD-10-CM

## 2023-02-06 DIAGNOSIS — C50.811 MALIGNANT NEOPLASM OF OVERLAPPING SITES OF RIGHT BREAST IN FEMALE, ESTROGEN RECEPTOR POSITIVE (HCC): ICD-10-CM

## 2023-02-06 DIAGNOSIS — Z17.0 MALIGNANT NEOPLASM OF OVERLAPPING SITES OF RIGHT BREAST IN FEMALE, ESTROGEN RECEPTOR POSITIVE (HCC): ICD-10-CM

## 2023-02-06 DIAGNOSIS — R50.81 NEUTROPENIC FEVER (HCC): ICD-10-CM

## 2023-02-06 DIAGNOSIS — Z17.0 MALIGNANT NEOPLASM OF OVERLAPPING SITES OF RIGHT BREAST IN FEMALE, ESTROGEN RECEPTOR POSITIVE (HCC): Primary | ICD-10-CM

## 2023-02-06 DIAGNOSIS — C50.811 MALIGNANT NEOPLASM OF OVERLAPPING SITES OF RIGHT BREAST IN FEMALE, ESTROGEN RECEPTOR POSITIVE (HCC): Primary | ICD-10-CM

## 2023-02-06 LAB
ALBUMIN SERPL-MCNC: 3.4 G/DL (ref 3.2–4.6)
ALBUMIN/GLOB SERPL: 0.9 (ref 0.4–1.6)
ALP SERPL-CCNC: 76 U/L (ref 50–136)
ALT SERPL-CCNC: 26 U/L (ref 12–65)
ANION GAP SERPL CALC-SCNC: 3 MMOL/L (ref 2–11)
AST SERPL-CCNC: 15 U/L (ref 15–37)
BASOPHILS # BLD: 0 K/UL (ref 0–0.2)
BASOPHILS NFR BLD: 1 % (ref 0–2)
BILIRUB SERPL-MCNC: 1 MG/DL (ref 0.2–1.1)
BUN SERPL-MCNC: 12 MG/DL (ref 8–23)
CALCIUM SERPL-MCNC: 9.2 MG/DL (ref 8.3–10.4)
CHLORIDE SERPL-SCNC: 104 MMOL/L (ref 101–110)
CO2 SERPL-SCNC: 28 MMOL/L (ref 21–32)
CREAT SERPL-MCNC: 0.8 MG/DL (ref 0.6–1)
DIFFERENTIAL METHOD BLD: ABNORMAL
EOSINOPHIL # BLD: 0.1 K/UL (ref 0–0.8)
EOSINOPHIL NFR BLD: 4 % (ref 0.5–7.8)
ERYTHROCYTE [DISTWIDTH] IN BLOOD BY AUTOMATED COUNT: 13.1 % (ref 11.9–14.6)
GLOBULIN SER CALC-MCNC: 3.6 G/DL (ref 2.8–4.5)
GLUCOSE SERPL-MCNC: 105 MG/DL (ref 65–100)
HCT VFR BLD AUTO: 42.5 % (ref 35.8–46.3)
HGB BLD-MCNC: 13.7 G/DL (ref 11.7–15.4)
IMM GRANULOCYTES # BLD AUTO: 0 K/UL (ref 0–0.5)
IMM GRANULOCYTES NFR BLD AUTO: 1 % (ref 0–5)
LYMPHOCYTES # BLD: 0.8 K/UL (ref 0.5–4.6)
LYMPHOCYTES NFR BLD: 56 % (ref 13–44)
MCH RBC QN AUTO: 27.7 PG (ref 26.1–32.9)
MCHC RBC AUTO-ENTMCNC: 32.2 G/DL (ref 31.4–35)
MCV RBC AUTO: 85.9 FL (ref 82–102)
MONOCYTES # BLD: 0.5 K/UL (ref 0.1–1.3)
MONOCYTES NFR BLD: 31 % (ref 4–12)
NEUTS SEG # BLD: 0.1 K/UL (ref 1.7–8.2)
NEUTS SEG NFR BLD: 7 % (ref 43–78)
NRBC # BLD: 0 K/UL (ref 0–0.2)
PLATELET # BLD AUTO: 146 K/UL (ref 150–450)
PLATELET COMMENT: SLIGHT
PMV BLD AUTO: 9.8 FL (ref 9.4–12.3)
POTASSIUM SERPL-SCNC: 4.5 MMOL/L (ref 3.5–5.1)
PROT SERPL-MCNC: 7 G/DL (ref 6.3–8.2)
RBC # BLD AUTO: 4.95 M/UL (ref 4.05–5.2)
RBC MORPH BLD: ABNORMAL
SODIUM SERPL-SCNC: 135 MMOL/L (ref 133–143)
WBC # BLD AUTO: 1.5 K/UL (ref 4.3–11.1)
WBC MORPH BLD: ABNORMAL

## 2023-02-06 PROCEDURE — 87040 BLOOD CULTURE FOR BACTERIA: CPT

## 2023-02-06 PROCEDURE — 6360000002 HC RX W HCPCS: Performed by: NURSE PRACTITIONER

## 2023-02-06 PROCEDURE — 0202U NFCT DS 22 TRGT SARS-COV-2: CPT

## 2023-02-06 PROCEDURE — 71045 X-RAY EXAM CHEST 1 VIEW: CPT

## 2023-02-06 PROCEDURE — 6370000000 HC RX 637 (ALT 250 FOR IP): Performed by: NURSE PRACTITIONER

## 2023-02-06 PROCEDURE — 87086 URINE CULTURE/COLONY COUNT: CPT

## 2023-02-06 PROCEDURE — 80053 COMPREHEN METABOLIC PANEL: CPT

## 2023-02-06 PROCEDURE — 2580000003 HC RX 258: Performed by: NURSE PRACTITIONER

## 2023-02-06 PROCEDURE — 99222 1ST HOSP IP/OBS MODERATE 55: CPT | Performed by: INTERNAL MEDICINE

## 2023-02-06 PROCEDURE — 96365 THER/PROPH/DIAG IV INF INIT: CPT

## 2023-02-06 PROCEDURE — 1170000000 HC RM PRIVATE ONCOLOGY

## 2023-02-06 PROCEDURE — 36415 COLL VENOUS BLD VENIPUNCTURE: CPT

## 2023-02-06 PROCEDURE — 85025 COMPLETE CBC W/AUTO DIFF WBC: CPT

## 2023-02-06 RX ORDER — PROCHLORPERAZINE MALEATE 10 MG
10 TABLET ORAL EVERY 6 HOURS PRN
Status: DISCONTINUED | OUTPATIENT
Start: 2023-02-06 | End: 2023-02-08 | Stop reason: HOSPADM

## 2023-02-06 RX ORDER — SODIUM CHLORIDE 0.9 % (FLUSH) 0.9 %
5-40 SYRINGE (ML) INJECTION PRN
OUTPATIENT
Start: 2023-02-06

## 2023-02-06 RX ORDER — LISINOPRIL 5 MG/1
10 TABLET ORAL DAILY
Status: DISCONTINUED | OUTPATIENT
Start: 2023-02-07 | End: 2023-02-08 | Stop reason: HOSPADM

## 2023-02-06 RX ORDER — ROSUVASTATIN CALCIUM 20 MG/1
20 TABLET, COATED ORAL NIGHTLY
Status: DISCONTINUED | OUTPATIENT
Start: 2023-02-06 | End: 2023-02-08 | Stop reason: HOSPADM

## 2023-02-06 RX ORDER — SODIUM CHLORIDE 0.9 % (FLUSH) 0.9 %
5-40 SYRINGE (ML) INJECTION PRN
Status: DISCONTINUED | OUTPATIENT
Start: 2023-02-06 | End: 2023-02-08 | Stop reason: HOSPADM

## 2023-02-06 RX ORDER — SODIUM CHLORIDE 0.9 % (FLUSH) 0.9 %
5-40 SYRINGE (ML) INJECTION PRN
Status: CANCELLED | OUTPATIENT
Start: 2023-02-06

## 2023-02-06 RX ORDER — SODIUM CHLORIDE 9 MG/ML
5-250 INJECTION, SOLUTION INTRAVENOUS PRN
OUTPATIENT
Start: 2023-02-06

## 2023-02-06 RX ORDER — SODIUM CHLORIDE 0.9 % (FLUSH) 0.9 %
5-40 SYRINGE (ML) INJECTION PRN
Status: DISCONTINUED | OUTPATIENT
Start: 2023-02-06 | End: 2023-02-07 | Stop reason: HOSPADM

## 2023-02-06 RX ORDER — HEPARIN SODIUM (PORCINE) LOCK FLUSH IV SOLN 100 UNIT/ML 100 UNIT/ML
500 SOLUTION INTRAVENOUS PRN
OUTPATIENT
Start: 2023-02-06

## 2023-02-06 RX ORDER — SODIUM CHLORIDE 9 MG/ML
INJECTION, SOLUTION INTRAVENOUS PRN
Status: DISCONTINUED | OUTPATIENT
Start: 2023-02-06 | End: 2023-02-08 | Stop reason: HOSPADM

## 2023-02-06 RX ORDER — ONDANSETRON 4 MG/1
4 TABLET, ORALLY DISINTEGRATING ORAL 3 TIMES DAILY PRN
Status: DISCONTINUED | OUTPATIENT
Start: 2023-02-06 | End: 2023-02-08 | Stop reason: HOSPADM

## 2023-02-06 RX ORDER — ENOXAPARIN SODIUM 100 MG/ML
40 INJECTION SUBCUTANEOUS DAILY
Status: DISCONTINUED | OUTPATIENT
Start: 2023-02-07 | End: 2023-02-08 | Stop reason: HOSPADM

## 2023-02-06 RX ORDER — POLYETHYLENE GLYCOL 3350 17 G/17G
17 POWDER, FOR SOLUTION ORAL DAILY PRN
Status: DISCONTINUED | OUTPATIENT
Start: 2023-02-06 | End: 2023-02-08 | Stop reason: HOSPADM

## 2023-02-06 RX ORDER — ACETAMINOPHEN 325 MG/1
650 TABLET ORAL EVERY 4 HOURS PRN
Status: DISCONTINUED | OUTPATIENT
Start: 2023-02-06 | End: 2023-02-08 | Stop reason: HOSPADM

## 2023-02-06 RX ORDER — SODIUM CHLORIDE 0.9 % (FLUSH) 0.9 %
5-40 SYRINGE (ML) INJECTION EVERY 12 HOURS SCHEDULED
Status: DISCONTINUED | OUTPATIENT
Start: 2023-02-06 | End: 2023-02-08 | Stop reason: HOSPADM

## 2023-02-06 RX ADMIN — NYSTATIN 500000 UNITS: 100000 SUSPENSION ORAL at 23:42

## 2023-02-06 RX ADMIN — ROSUVASTATIN CALCIUM 20 MG: 20 TABLET, COATED ORAL at 23:42

## 2023-02-06 RX ADMIN — SODIUM CHLORIDE, PRESERVATIVE FREE 10 ML: 5 INJECTION INTRAVENOUS at 16:05

## 2023-02-06 RX ADMIN — SODIUM CHLORIDE, PRESERVATIVE FREE 10 ML: 5 INJECTION INTRAVENOUS at 23:42

## 2023-02-06 RX ADMIN — CEFEPIME 2000 MG: 2 INJECTION, POWDER, FOR SOLUTION INTRAVENOUS at 16:08

## 2023-02-06 RX ADMIN — CEFEPIME 2000 MG: 2 INJECTION, POWDER, FOR SOLUTION INTRAVENOUS at 23:42

## 2023-02-06 ASSESSMENT — PATIENT HEALTH QUESTIONNAIRE - PHQ9
SUM OF ALL RESPONSES TO PHQ QUESTIONS 1-9: 0
2. FEELING DOWN, DEPRESSED OR HOPELESS: 0
SUM OF ALL RESPONSES TO PHQ QUESTIONS 1-9: 0

## 2023-02-06 NOTE — H&P
[facility] Hematology & Oncology        Inpatient Hematology / Oncology History and Physical    Reason for Admission:  neutropenic fever    History of Present Illness:  Ms. Abdoul Mcclain is a 75 y.o. female admitted on 2/6/2023 with a primary diagnosis of neutropenic fever. Mrs Abdoul Mcclain presented to the office today for tox check s/p cycle 1 TC with G-CSF. She reports minimal nausea and diarrhea x 1 episode in the past week. The past few days she reports oral sensitivity and sore throat, + thrush on exam.  Fatigue is present but mangeable. There is no cough, shortness of breath, or edema. Since her G-CSF injection she has experienced arthralgias/myalgias and has been taking claritin daily along with a few doses of ibuprofen. On arrival to [address] [place] [address] today she had a temperature of 102.4 and mild tachycardia. She didn't realize she had a fever and there are no other symptoms. Labs reviewed and ANC 0.1, therefore she will be admitted for IV antibiotics. Review of Systems:  Constitutional +fatigue, fever Denies chills, weight loss, appetite changes, night sweats. HEENT Denies trauma, blurry vision, hearing loss, ear pain, nosebleeds, sore throat, neck pain and ear discharge. Skin Denies lesions or rashes. Lungs Denies dyspnea, cough, sputum production or hemoptysis. Cardiovascular Denies chest pain, palpitations, or lower extremity edema. Gastrointestinal +nausea Denies  vomiting, changes in bowel habits, bloody or black stools, abdominal pain.  Denies dysuria, frequency or hesitancy of urination. Neuro Denies headaches, visual changes or ataxia. Denies dizziness, tingling, tremors, sensory change, speech change, focal weakness or headaches. Hematology Denies easy bruising or bleeding, denies gingival bleeding or epistaxis. Endo Denies heat/cold intolerance, denies diabetes or thyroid abnormalities. MSK +arthralgias/myalgias Denies back pain or frequent falls.      Psychiatric/Behavioral Denies depression and substance abuse. The patient is not nervous/anxious.          No Known Allergies  Past Medical History:   Diagnosis Date    Cat scratch fever 1973    Elevated liver function tests     Hyperlipidemia     managed with medication    Hypertension     managed with medication    Malignant neoplasm of overlapping sites of right breast in female, estrogen receptor positive ([facility] 2022    Neutropenic fever ([facility] 2/6/2023    Renal insufficiency      Past Surgical History:   Procedure Laterality Date    BREAST BIOPSY Right 12/20/2022    RIGHT SENTINEL NODE BIOPSY  performed by Coco Crandall MD at [facility] Right 12/20/2022    RIGHT BREAST MASTECTOMY performed by Coco Crandall MD at [address] [place] [address]      right lymph node excision - neck    US BREAST BIOPSY W LOC DEVICE 1ST LESION RIGHT Right 10/05/2022    US BREAST NEEDLE BIOPSY RIGHT 10/5/2022 Elena Miller MD [facility]     Family History   Problem Relation Age of Onset    Thyroid Disease Mother     Heart Disease Mother     High Blood Pressure Father     Heart Disease Father     Thyroid Disease Father     Cancer Sister 48        lung    Diabetes Neg Hx      Social History     Socioeconomic History    Marital status:      Spouse name: Not on file    Number of children: Not on file    Years of education: Not on file    Highest education level: Not on file   Occupational History    Not on file   Tobacco Use    Smoking status: Never    Smokeless tobacco: Never   Vaping Use    Vaping Use: Never used   Substance and Sexual Activity    Alcohol use: Not Currently     Comment: Socially    Drug use: Never    Sexual activity: Not on file   Other Topics Concern    Not on file   Social History Narrative    Not on file     Social Determinants of Health     Financial Resource Strain: Low Risk     Difficulty of Paying Living Expenses: Not very hard   Food Insecurity: No Food Insecurity    Worried About Running Out of Food in the Last Year: Never true    Ran Out of Food in the Last Year: Never true   Transportation Needs: No Transportation Needs    Lack of Transportation (Medical): No    Lack of Transportation (Non-Medical): No   Physical Activity: Inactive    Days of Exercise per Week: 0 days    Minutes of Exercise per Session: 0 min   Stress: No Stress Concern Present    Feeling of Stress : Not at all   Social Connections: Unknown    Frequency of Communication with Friends and Family: More than three times a week    Frequency of Social Gatherings with Friends and Family: More than three times a week    Attends Church Services: Not on file    Active Member of Clubs or Organizations: Not on file    Attends Club or Organization Meetings: Not on file    Marital Status:    Intimate Partner Violence: Not At Risk    Fear of Current or Ex-Partner: No    Emotionally Abused: No    Physically Abused: No    Sexually Abused: No   Housing Stability: Unknown    Unable to Pay for Housing in the Last Year: No    Number of Places Lived in the Last Year: Not on file    Unstable Housing in the Last Year: No     No current facility-administered medications for this encounter. Current Outpatient Medications   Medication Sig Dispense Refill    ondansetron (ZOFRAN) 4 MG tablet Take 1 tablet by mouth 3 times daily as needed for Nausea or Vomiting 30 tablet 0    prochlorperazine (COMPAZINE) 10 MG tablet Take 1 tablet by mouth every 6 hours as needed (nausea) (Patient not taking: Reported on 2/6/2023) 120 tablet 0    lidocaine-prilocaine (EMLA) 2.5-2.5 % cream Apply topically as needed.  (Patient not taking: Reported on 2/6/2023) 30 g 0    ondansetron (ZOFRAN-ODT) 4 MG disintegrating tablet Take 1 tablet by mouth 3 times daily as needed for Nausea or Vomiting (Patient not taking: No sig reported) 21 tablet 0    acetaminophen (TYLENOL) 325 MG tablet Take 2 tablets by mouth every 4 hours as needed for Pain (over the counter medication. May take for pain or alternate with Ibuprofen) 1 tablet 0    ibuprofen (ADVIL;MOTRIN) 600 MG tablet Take 1 tablet by mouth 4 times daily as needed for Pain (May alternate with Tylenol for pain. OVER THE COUNTER MEDICATION) 1 tablet 0    lisinopril (PRINIVIL;ZESTRIL) 10 MG tablet Take 1 tablet by mouth daily 90 tablet 1    Multiple Vitamins-Minerals (MULTIVITAMIN ADULTS 50+ PO) Take by mouth at bedtime      Omega-3 Fatty Acids (FISH OIL) 1200 MG CAPS Take by mouth at bedtime      rosuvastatin (CRESTOR) 20 MG tablet Take 1 tablet by mouth nightly 90 tablet 3       OBJECTIVE:  No data found. Temp (24hrs), Av.4 °F (39.1 °C), Min:102.4 °F (39.1 °C), Max:102.4 °F (39.1 °C)    No intake/output data recorded. Physical Exam:  Constitutional: Well developed, well nourished female in no acute distress, sitting comfortably in the exam chair. HEENT: Normocephalic and atraumatic. +thrush Sclerae anicteric. Neck supple without JVD. No thyromegaly present. Skin Warm and dry. No bruising and no rash noted. No erythema. No pallor. Respiratory Lungs are clear to auscultation bilaterally without wheezes, rales or rhonchi, normal air exchange without accessory muscle use. CVS Normal rate, regular rhythm and normal S1 and S2. No murmurs, gallops, or rubs. Abdomen Soft, nontender and nondistended, normoactive bowel sounds. Neuro Grossly nonfocal with no obvious sensory or motor deficits. MSK Normal range of motion in general.  No edema and no tenderness. Psych Appropriate mood and affect.         Labs:    Recent Results (from the past 24 hour(s))   CBC with Auto Differential    Collection Time: 23  1:10 PM   Result Value Ref Range    WBC 1.5 (LL) 4.3 - 11.1 K/uL    RBC 4.95 4.05 - 5.2 M/uL    Hemoglobin 13.7 11.7 - 15.4 g/dL    Hematocrit 42.5 35.8 - 46.3 %    MCV 85.9 82.0 - 102.0 FL    MCH 27.7 26.1 - 32.9 PG    MCHC 32.2 31.4 - 35.0 g/dL    RDW 13.1 11.9 - 14.6 % Platelets 580 (L) 751 - 450 K/uL    MPV 9.8 9.4 - 12.3 FL    nRBC 0.00 0.0 - 0.2 K/uL    Seg Neutrophils 7 (L) 43 - 78 %    Lymphocytes 56 (H) 13 - 44 %    Monocytes 31 (H) 4.0 - 12.0 %    Eosinophils % 4 0.5 - 7.8 %    Basophils 1 0.0 - 2.0 %    Immature Granulocytes 1 0.0 - 5.0 %    Segs Absolute 0.1 (L) 1.7 - 8.2 K/UL    Absolute Lymph # 0.8 0.5 - 4.6 K/UL    Absolute Mono # 0.5 0.1 - 1.3 K/UL    Absolute Eos # 0.1 0.0 - 0.8 K/UL    Basophils Absolute 0.0 0.0 - 0.2 K/UL    Absolute Immature Granulocyte 0.0 0.0 - 0.5 K/UL    RBC Comment NORMOCYTIC/NORMOCHROMIC      WBC Comment Result Confirmed By Smear      Platelet Comment SLIGHT      Differential Type AUTOMATED     Comprehensive Metabolic Panel    Collection Time: 02/06/23  1:10 PM   Result Value Ref Range    Sodium 135 133 - 143 mmol/L    Potassium 4.5 3.5 - 5.1 mmol/L    Chloride 104 101 - 110 mmol/L    CO2 28 21 - 32 mmol/L    Anion Gap 3 2 - 11 mmol/L    Glucose 105 (H) 65 - 100 mg/dL    BUN 12 8 - 23 MG/DL    Creatinine 0.80 0.6 - 1.0 MG/DL    Est, Glom Filt Rate >60 >60 ml/min/1.73m2    Calcium 9.2 8.3 - 10.4 MG/DL    Total Bilirubin 1.0 0.2 - 1.1 MG/DL    ALT 26 12 - 65 U/L    AST 15 15 - 37 U/L    Alk Phosphatase 76 50 - 136 U/L    Total Protein 7.0 6.3 - 8.2 g/dL    Albumin 3.4 3.2 - 4.6 g/dL    Globulin 3.6 2.8 - 4.5 g/dL    Albumin/Globulin Ratio 0.9 0.4 - 1.6           ASSESSMENT:  Patient Active Problem List   Diagnosis    Elevated liver function tests    Dyslipidemia    Weight gain    Renal insufficiency    Malignant neoplasm of overlapping sites of right breast in female, estrogen receptor positive ([facility] [address]    Primary hypertension    Ductal carcinoma in situ of right breast    Breast cancer in female [facility])    Neutropenic fever (HCC)          PLAN:  Breast Cancer  -s/p C1 TC with G-CSF    Neutropenic Fever  -BC/UC obtained at [facility], dose of Cefepime at [facility], will obtain CXR and vital panel once inpatient and continue Cef/Vanc    Thrush  -start nystatin swish and swallow    Nausea  -anti-emetics prn    Arthralgia/Myalgias   -could be attributes to G-CSF and/or fever, Tylenol prn    Lovenox for DVT prophylaxis  Supportive Care  DERIK Billingsley  Hematology & Oncology  [id number] [facility]  Office : [phone]

## 2023-02-06 NOTE — PROGRESS NOTES
Arrived to the [facility]. Shivani labs drawn, cefepime, and urine culture completed. Patient tolerated well. Any issues or concerns during appointment: none. Patient aware of next infusion appointment on 2/21/23 (date) at 0830 (time). Patient aware of next lab and [facility] office visit on 2/20/23 (date) at 36 (time). Patient instructed to call provider with temperature of 100.4 or greater or nausea/vomiting/ diarrhea or pain not controlled by medications  Discharged ambulatory accompanied by spouse.

## 2023-02-07 PROBLEM — B37.0 THRUSH: Status: ACTIVE | Noted: 2023-02-07

## 2023-02-07 LAB
ALBUMIN SERPL-MCNC: 2.7 G/DL (ref 3.2–4.6)
ALBUMIN/GLOB SERPL: 0.8 (ref 0.4–1.6)
ALP SERPL-CCNC: 65 U/L (ref 50–136)
ALT SERPL-CCNC: 28 U/L (ref 12–65)
ANION GAP SERPL CALC-SCNC: 6 MMOL/L (ref 2–11)
AST SERPL-CCNC: 20 U/L (ref 15–37)
B PERT DNA SPEC QL NAA+PROBE: NOT DETECTED
BASOPHILS # BLD: 0.1 K/UL (ref 0–0.2)
BASOPHILS NFR BLD: 1 % (ref 0–2)
BILIRUB SERPL-MCNC: 0.5 MG/DL (ref 0.2–1.1)
BORDETELLA PARAPERTUSSIS BY PCR: NOT DETECTED
BUN SERPL-MCNC: 13 MG/DL (ref 8–23)
C PNEUM DNA SPEC QL NAA+PROBE: NOT DETECTED
CALCIUM SERPL-MCNC: 8.3 MG/DL (ref 8.3–10.4)
CHLORIDE SERPL-SCNC: 106 MMOL/L (ref 101–110)
CO2 SERPL-SCNC: 23 MMOL/L (ref 21–32)
CREAT SERPL-MCNC: 0.8 MG/DL (ref 0.6–1)
DIFFERENTIAL METHOD BLD: ABNORMAL
EOSINOPHIL # BLD: 0.1 K/UL (ref 0–0.8)
EOSINOPHIL NFR BLD: 1 % (ref 0.5–7.8)
ERYTHROCYTE [DISTWIDTH] IN BLOOD BY AUTOMATED COUNT: 13.1 % (ref 11.9–14.6)
FLUAV SUBTYP SPEC NAA+PROBE: NOT DETECTED
FLUBV RNA SPEC QL NAA+PROBE: NOT DETECTED
GLOBULIN SER CALC-MCNC: 3.2 G/DL (ref 2.8–4.5)
GLUCOSE SERPL-MCNC: 128 MG/DL (ref 65–100)
HADV DNA SPEC QL NAA+PROBE: NOT DETECTED
HCOV 229E RNA SPEC QL NAA+PROBE: NOT DETECTED
HCOV HKU1 RNA SPEC QL NAA+PROBE: NOT DETECTED
HCOV NL63 RNA SPEC QL NAA+PROBE: NOT DETECTED
HCOV OC43 RNA SPEC QL NAA+PROBE: NOT DETECTED
HCT VFR BLD AUTO: 37.8 % (ref 35.8–46.3)
HGB BLD-MCNC: 12.7 G/DL (ref 11.7–15.4)
HMPV RNA SPEC QL NAA+PROBE: NOT DETECTED
HPIV1 RNA SPEC QL NAA+PROBE: NOT DETECTED
HPIV2 RNA SPEC QL NAA+PROBE: NOT DETECTED
HPIV3 RNA SPEC QL NAA+PROBE: NOT DETECTED
HPIV4 RNA SPEC QL NAA+PROBE: NOT DETECTED
IMM GRANULOCYTES # BLD AUTO: 0.1 K/UL (ref 0–0.5)
IMM GRANULOCYTES NFR BLD AUTO: 2 % (ref 0–5)
LYMPHOCYTES # BLD: 1.4 K/UL (ref 0.5–4.6)
LYMPHOCYTES NFR BLD: 27 % (ref 13–44)
M PNEUMO DNA SPEC QL NAA+PROBE: NOT DETECTED
MAGNESIUM SERPL-MCNC: 2 MG/DL (ref 1.8–2.4)
MCH RBC QN AUTO: 28.6 PG (ref 26.1–32.9)
MCHC RBC AUTO-ENTMCNC: 33.6 G/DL (ref 31.4–35)
MCV RBC AUTO: 85.1 FL (ref 82–102)
MONOCYTES # BLD: 0.9 K/UL (ref 0.1–1.3)
MONOCYTES NFR BLD: 18 % (ref 4–12)
NEUTS SEG # BLD: 2.5 K/UL (ref 1.7–8.2)
NEUTS SEG NFR BLD: 51 % (ref 43–78)
NRBC # BLD: 0 K/UL (ref 0–0.2)
PLATELET # BLD AUTO: 147 K/UL (ref 150–450)
PLATELET COMMENT: SLIGHT
PMV BLD AUTO: 10.1 FL (ref 9.4–12.3)
POTASSIUM SERPL-SCNC: 4 MMOL/L (ref 3.5–5.1)
PROT SERPL-MCNC: 5.9 G/DL (ref 6.3–8.2)
RBC # BLD AUTO: 4.44 M/UL (ref 4.05–5.2)
RBC MORPH BLD: ABNORMAL
RSV RNA SPEC QL NAA+PROBE: NOT DETECTED
RV+EV RNA SPEC QL NAA+PROBE: NOT DETECTED
SARS-COV-2 RNA RESP QL NAA+PROBE: NOT DETECTED
SODIUM SERPL-SCNC: 135 MMOL/L (ref 133–143)
VANCOMYCIN SERPL-MCNC: <0.8 UG/ML
WBC # BLD AUTO: 5.1 K/UL (ref 4.3–11.1)
WBC MORPH BLD: ABNORMAL

## 2023-02-07 PROCEDURE — 99232 SBSQ HOSP IP/OBS MODERATE 35: CPT | Performed by: INTERNAL MEDICINE

## 2023-02-07 PROCEDURE — 6370000000 HC RX 637 (ALT 250 FOR IP): Performed by: NURSE PRACTITIONER

## 2023-02-07 PROCEDURE — 6360000002 HC RX W HCPCS: Performed by: NURSE PRACTITIONER

## 2023-02-07 PROCEDURE — 36415 COLL VENOUS BLD VENIPUNCTURE: CPT

## 2023-02-07 PROCEDURE — 83735 ASSAY OF MAGNESIUM: CPT

## 2023-02-07 PROCEDURE — 1170000000 HC RM PRIVATE ONCOLOGY

## 2023-02-07 PROCEDURE — 80053 COMPREHEN METABOLIC PANEL: CPT

## 2023-02-07 PROCEDURE — 85025 COMPLETE CBC W/AUTO DIFF WBC: CPT

## 2023-02-07 PROCEDURE — 76937 US GUIDE VASCULAR ACCESS: CPT

## 2023-02-07 PROCEDURE — 2580000003 HC RX 258: Performed by: NURSE PRACTITIONER

## 2023-02-07 PROCEDURE — APPSS30 APP SPLIT SHARED TIME 16-30 MINUTES: Performed by: NURSE PRACTITIONER

## 2023-02-07 PROCEDURE — 80202 ASSAY OF VANCOMYCIN: CPT

## 2023-02-07 RX ORDER — TEMAZEPAM 15 MG/1
15 CAPSULE ORAL NIGHTLY PRN
Status: DISCONTINUED | OUTPATIENT
Start: 2023-02-07 | End: 2023-02-08 | Stop reason: HOSPADM

## 2023-02-07 RX ADMIN — NYSTATIN 500000 UNITS: 100000 SUSPENSION ORAL at 20:14

## 2023-02-07 RX ADMIN — VANCOMYCIN HYDROCHLORIDE 2000 MG: 10 INJECTION, POWDER, LYOPHILIZED, FOR SOLUTION INTRAVENOUS at 04:04

## 2023-02-07 RX ADMIN — NYSTATIN 500000 UNITS: 100000 SUSPENSION ORAL at 08:15

## 2023-02-07 RX ADMIN — NYSTATIN 500000 UNITS: 100000 SUSPENSION ORAL at 14:25

## 2023-02-07 RX ADMIN — SODIUM CHLORIDE, PRESERVATIVE FREE 10 ML: 5 INJECTION INTRAVENOUS at 08:15

## 2023-02-07 RX ADMIN — TEMAZEPAM 15 MG: 15 CAPSULE ORAL at 23:07

## 2023-02-07 RX ADMIN — ROSUVASTATIN CALCIUM 20 MG: 20 TABLET, COATED ORAL at 20:14

## 2023-02-07 RX ADMIN — LISINOPRIL 10 MG: 5 TABLET ORAL at 08:14

## 2023-02-07 RX ADMIN — CEFEPIME 2000 MG: 2 INJECTION, POWDER, FOR SOLUTION INTRAVENOUS at 17:50

## 2023-02-07 RX ADMIN — ENOXAPARIN SODIUM 40 MG: 100 INJECTION SUBCUTANEOUS at 08:15

## 2023-02-07 RX ADMIN — VANCOMYCIN HYDROCHLORIDE 750 MG: 750 INJECTION, POWDER, LYOPHILIZED, FOR SOLUTION INTRAVENOUS at 16:21

## 2023-02-07 RX ADMIN — CEFEPIME 2000 MG: 2 INJECTION, POWDER, FOR SOLUTION INTRAVENOUS at 08:15

## 2023-02-07 RX ADMIN — NYSTATIN 500000 UNITS: 100000 SUSPENSION ORAL at 17:50

## 2023-02-07 RX ADMIN — CEFEPIME 2000 MG: 2 INJECTION, POWDER, FOR SOLUTION INTRAVENOUS at 23:10

## 2023-02-07 RX ADMIN — SODIUM CHLORIDE, PRESERVATIVE FREE 10 ML: 5 INJECTION INTRAVENOUS at 20:14

## 2023-02-07 NOTE — CARE COORDINATION
Case Management Assessment  Initial Evaluation    Date/Time of Evaluation: 2/7/2023 1:49 PM  Assessment Completed by: Daisha Pandey RN    If patient is discharged prior to next notation, then this note serves as note for discharge by case management. Patient Name: Claudia Cardenas                   YOB: 1955  Diagnosis: Neutropenic fever ([facility] [address] [D70.9, R50.81]                   Date / Time: 2/6/2023 10:51 PM    Patient Admission Status: Inpatient     Current PCP: Familia Goldstein MD  PCP verified by CM? (P) Yes    Chart Reviewed: Yes      History Provided by: (P) Medical Record  Patient Orientation: (P) Alert and Oriented    Patient Cognition: (P) Alert    Hospitalization in the last 30 days (Readmission):  No    If yes, Readmission Assessment in  Navigator will be completed.     Advance Directives:     Code Status: Full Code     Primary Decision MakerCamy Michaud - [phone]    Discharge Planning  Patient lives with:   Type of Home:    Primary Care Giver: (P) Self  Patient Support Systems include: (P) Children   Current Financial resources:    Current community resources:    Current services prior to admission:    Type of Home Care services:       ADLS  Prior functional level: (P) Independent in ADLs/IADLs  Current functional level: (P) Independent in ADLs/IADLs    PT AM-PAC:   /24  OT AM-PAC:   /24    Family can provide assistance at DC: (P) Yes  Would you like Case Management to discuss the discharge plan with any other family members/significant others, and if so, who? (P) Yes  Plans to Return to Present Housing: (P) Yes  Other Identified Issues/Barriers to RETURNING to current housing: no barriers notes at this time  Potential Assistance needed at discharge: (P) N/A  Patient expects to discharge to:    Plan for transportation at discharge: (P) Family    Financial  Payor: MEDICARE / Plan: MEDICARE PART A AND B / Product Type: *No Product type* /     Does insurance require precert for SNF: Yes    Potential assistance Purchasing Medications: (P) No      [id number] [place] [facility] [address] [place] [phone]  [facility] [address] [facility] [address]  Phone: [phone] Fax: [phone]      Factors facilitating achievement of predicted outcomes: Family support, Cooperative, and Pleasant    Barriers to discharge: Pain and Medical complications    Additional Case Management Notes: 75 yr old female admitted to the hospital with neutropenic fevers. Was seen at the cancer center for appointment. On arrival to [address] [place] [address] today she had a temperature of 102.4 and mild tachycardia. She didn't realize she had a fever and there are no other symptoms. Labs reviewed and ANC 0.1, therefore she will be admitted for IV antibiotics. Discharge plan is Home with family assistance  Will continue to follow for discharge planning needs  Please consult  if any new issues arise         The Plan for Transition of Care is related to the following treatment goals of Neutropenic fever ([address] [D70.9, Y40.53]    IF APPLICABLE: The Patient and/or patient representative Hair Corona and her family were provided with a choice of provider and agrees with the discharge plan. Freedom of choice list with basic dialogue that supports the patient's individualized plan of care/goals and shares the quality data associated with the providers was provided to:     Patient Representative Name:       The Patient and/or Patient Representative Agree with the Discharge Plan?       Daisha Pandey RN  Case Management Department

## 2023-02-07 NOTE — PROGRESS NOTES
[facility] Hematology & Oncology        Inpatient Hematology / Oncology Progress Note    Reason for Admission:  Neutropenic fever ([facility] [address] [D70.9, R50.81]    24 Hour Events:  VSS, Tmax 100.5 (2/6 PM)  Otherwise, afebrile  No complaints  Continues Cefe/Vanc  BC NGTD        ROS:  Constitutional: Negative for fever, chills, weakness, malaise, fatigue. CV: Negative for chest pain, palpitations, edema. Respiratory: Negative for dyspnea, cough, wheezing. GI: Negative for nausea, abdominal pain, diarrhea. 10 point review of systems is otherwise negative with the exception of the elements mentioned above in the HPI.            No Known Allergies  Past Medical History:   Diagnosis Date    Cat scratch fever 1973    Elevated liver function tests     Hyperlipidemia     managed with medication    Hypertension     managed with medication    Malignant neoplasm of overlapping sites of right breast in female, estrogen receptor positive ([facility] [address] 2022    Neutropenic fever ([facility] [address] 2/6/2023    Renal insufficiency      Past Surgical History:   Procedure Laterality Date    BREAST BIOPSY Right 12/20/2022    RIGHT SENTINEL NODE BIOPSY  performed by Richard Fall MD at [facility] Right 12/20/2022    RIGHT BREAST MASTECTOMY performed by Richard Fall MD at [address] [place] [address]      right lymph node excision - neck    US BREAST BIOPSY W LOC DEVICE 1ST LESION RIGHT Right 10/05/2022    US BREAST NEEDLE BIOPSY RIGHT 10/5/2022 Gabrielle Espinal MD [facility]     Family History   Problem Relation Age of Onset    Thyroid Disease Mother     Heart Disease Mother     High Blood Pressure Father     Heart Disease Father     Thyroid Disease Father     Cancer Sister 48        lung    Diabetes Neg Hx      Social History     Socioeconomic History    Marital status:      Spouse name: Not on file    Number of children: Not on file    Years of education: Not on file    Highest education level: Not on file   Occupational History    Not on file   Tobacco Use    Smoking status: Never    Smokeless tobacco: Never   Vaping Use    Vaping Use: Never used   Substance and Sexual Activity    Alcohol use: Not Currently     Comment: Socially    Drug use: Never    Sexual activity: Not on file   Other Topics Concern    Not on file   Social History Narrative    Not on file     Social Determinants of Health     Financial Resource Strain: Low Risk     Difficulty of Paying Living Expenses: Not very hard   Food Insecurity: No Food Insecurity    Worried About Running Out of Food in the Last Year: Never true    Ran Out of Food in the Last Year: Never true   Transportation Needs: No Transportation Needs    Lack of Transportation (Medical): No    Lack of Transportation (Non-Medical):  No   Physical Activity: Inactive    Days of Exercise per Week: 0 days    Minutes of Exercise per Session: 0 min   Stress: No Stress Concern Present    Feeling of Stress : Not at all   Social Connections: Unknown    Frequency of Communication with Friends and Family: More than three times a week    Frequency of Social Gatherings with Friends and Family: More than three times a week    Attends [employer] Services: Not on file    Active Member of Clubs or Organizations: Not on file    Attends Club or Organization Meetings: Not on file    Marital Status:    Intimate Partner Violence: Not At Risk    Fear of Current or Ex-Partner: No    Emotionally Abused: No    Physically Abused: No    Sexually Abused: No   Housing Stability: Unknown    Unable to Pay for Housing in the Last Year: No    Number of Places Lived in the Last Year: Not on file    Unstable Housing in the Last Year: No     Current Facility-Administered Medications   Medication Dose Route Frequency Provider Last Rate Last Admin    vancomycin (VANCOCIN) 750 mg in sodium chloride 0.9 % 250 mL IVPB ([id number])  750 mg IntraVENous Q12H TAMARA ChouC        acetaminophen (TYLENOL) tablet 650 mg  650 mg Oral Q4H PRN Bert Lara NP-TOBI        lisinopril (PRINIVIL;ZESTRIL) tablet 10 mg  10 mg Oral Daily Bert Lara NP-C   10 mg at 23 0814    ondansetron (ZOFRAN-ODT) disintegrating tablet 4 mg  4 mg Oral TID PRN Bert Lara NP-TOBI        prochlorperazine (COMPAZINE) tablet 10 mg  10 mg Oral Q6H PRN Bert Lara NP-TOBI        rosuvastatin (CRESTOR) tablet 20 mg  20 mg Oral Nightly Bert Lara NP-C   20 mg at 23 2342    sodium chloride flush 0.9 % injection 5-40 mL  5-40 mL IntraVENous 2 times per day Bert Lara NP-C   10 mL at 23 0815    sodium chloride flush 0.9 % injection 5-40 mL  5-40 mL IntraVENous PRN Bert Lara NP-TOBI        0.9 % sodium chloride infusion   IntraVENous PRN MARYAM Castro        enoxaparin (LOVENOX) injection 40 mg  40 mg SubCUTAneous Daily Bert Lara NP-C   40 mg at 23 0815    polyethylene glycol (GLYCOLAX) packet 17 g  17 g Oral Daily PRN MARYAM Castro        cefepime (MAXIPIME) 2,000 mg in sodium chloride 0.9 % 50 mL IVPB (mini-bag)  2,000 mg IntraVENous Q8H Bert Lara NP-C 12.5 mL/hr at 23 0815 2,000 mg at 23 0815    nystatin (MYCOSTATIN) [id number] UNIT/ML suspension 500,000 Units  5 mL Oral 4x Daily DERIK Johnston CNP   500,000 Units at 23 0815       OBJECTIVE:  Patient Vitals for the past 8 hrs:   BP Temp Temp src Pulse Resp SpO2   23 0834 134/76 98.2 °F (36.8 °C) -- 95 18 94 %   23 0219 124/64 98.4 °F (36.9 °C) Oral 83 17 97 %     Temp (24hrs), Av.9 °F (37.7 °C), Min:98.2 °F (36.8 °C), Max:102.4 °F (39.1 °C)    No intake/output data recorded. Physical Exam:  Constitutional: Well developed, well nourished female in no acute distress, sitting comfortably in the exam chair. HEENT: Normocephalic and atraumatic. +thrush Sclerae anicteric.  Neck supple without JVD. No thyromegaly present. Skin Warm and dry. No bruising and no rash noted. No erythema. No pallor. Respiratory Lungs are clear to auscultation bilaterally without wheezes, rales or rhonchi, normal air exchange without accessory muscle use. CVS Normal rate, regular rhythm and normal S1 and S2. No murmurs, gallops, or rubs. Abdomen Soft, nontender and nondistended, normoactive bowel sounds. Neuro Grossly nonfocal with no obvious sensory or motor deficits. MSK Normal range of motion in general.  No edema and no tenderness. Psych Appropriate mood and affect.         Labs:    Recent Results (from the past 24 hour(s))   CBC with Auto Differential    Collection Time: 02/06/23  1:10 PM   Result Value Ref Range    WBC 1.5 (LL) 4.3 - 11.1 K/uL    RBC 4.95 4.05 - 5.2 M/uL    Hemoglobin 13.7 11.7 - 15.4 g/dL    Hematocrit 42.5 35.8 - 46.3 %    MCV 85.9 82.0 - 102.0 FL    MCH 27.7 26.1 - 32.9 PG    MCHC 32.2 31.4 - 35.0 g/dL    RDW 13.1 11.9 - 14.6 %    Platelets 617 (L) 595 - 450 K/uL    MPV 9.8 9.4 - 12.3 FL    nRBC 0.00 0.0 - 0.2 K/uL    Seg Neutrophils 7 (L) 43 - 78 %    Lymphocytes 56 (H) 13 - 44 %    Monocytes 31 (H) 4.0 - 12.0 %    Eosinophils % 4 0.5 - 7.8 %    Basophils 1 0.0 - 2.0 %    Immature Granulocytes 1 0.0 - 5.0 %    Segs Absolute 0.1 (L) 1.7 - 8.2 K/UL    Absolute Lymph # 0.8 0.5 - 4.6 K/UL    Absolute Mono # 0.5 0.1 - 1.3 K/UL    Absolute Eos # 0.1 0.0 - 0.8 K/UL    Basophils Absolute 0.0 0.0 - 0.2 K/UL    Absolute Immature Granulocyte 0.0 0.0 - 0.5 K/UL    RBC Comment NORMOCYTIC/NORMOCHROMIC      WBC Comment Result Confirmed By Smear      Platelet Comment SLIGHT      Differential Type AUTOMATED     Comprehensive Metabolic Panel    Collection Time: 02/06/23  1:10 PM   Result Value Ref Range    Sodium 135 133 - 143 mmol/L    Potassium 4.5 3.5 - 5.1 mmol/L    Chloride 104 101 - 110 mmol/L    CO2 28 21 - 32 mmol/L    Anion Gap 3 2 - 11 mmol/L    Glucose 105 (H) 65 - 100 mg/dL BUN 12 8 - 23 MG/DL    Creatinine 0.80 0.6 - 1.0 MG/DL    Est, Glom Filt Rate >60 >60 ml/min/1.73m2    Calcium 9.2 8.3 - 10.4 MG/DL    Total Bilirubin 1.0 0.2 - 1.1 MG/DL    ALT 26 12 - 65 U/L    AST 15 15 - 37 U/L    Alk Phosphatase 76 50 - 136 U/L    Total Protein 7.0 6.3 - 8.2 g/dL    Albumin 3.4 3.2 - 4.6 g/dL    Globulin 3.6 2.8 - 4.5 g/dL    Albumin/Globulin Ratio 0.9 0.4 - 1.6     Culture, Urine    Collection Time: 02/06/23  4:04 PM    Specimen: Urine, clean catch    URINE   Result Value Ref Range    Special Requests NO SPECIAL REQUESTS      Culture        No growth after short period of incubation. Further results to follow after overnight incubation.    Culture, Blood 1    Collection Time: 02/06/23  4:04 PM    Specimen: Blood   Result Value Ref Range    Special Requests NO SPECIAL REQUESTS  LEFT  HAND        Culture NO GROWTH AFTER 11 HOURS     Respiratory Panel, Molecular, with COVID-19 (Restricted: peds pts or suitable admitted adults)    Collection Time: 02/06/23 11:08 PM    Specimen: Nasopharyngeal   Result Value Ref Range    Adenovirus by PCR NOT DETECTED NOTDET      Coronavirus 229E by PCR NOT DETECTED NOTDET      Coronavirus HKU1 by PCR NOT DETECTED NOTDET      Coronavirus NL63 by PCR NOT DETECTED NOTDET      Coronavirus OC43 by PCR NOT DETECTED NOTDET      SARS-CoV-2, PCR NOT DETECTED NOTDET      Human Metapneumovirus by PCR NOT DETECTED NOTDET      Rhinovirus Enterovirus PCR NOT DETECTED NOTDET      Influenza A by PCR NOT DETECTED NOTDET      Influenza B PCR NOT DETECTED NOTDET      Parainfluenza 1 PCR NOT DETECTED NOTDET      Parainfluenza 2 PCR NOT DETECTED NOTDET      Parainfluenza 3 PCR NOT DETECTED NOTDET      Parainfluenza 4 PCR NOT DETECTED NOTDET      Respiratory Syncytial Virus by PCR NOT DETECTED NOTDET      Bordetella parapertussis by PCR NOT DETECTED NOTDET      Bordetella pertussis by PCR NOT DETECTED NOTDET      Chlamydophila Pneumonia PCR NOT DETECTED NOTDET      Mycoplasma pneumo by PCR NOT DETECTED NOTDET     Vancomycin Level, Random    Collection Time: 02/07/23 12:27 AM   Result Value Ref Range    Vancomycin Rm <0.8 UG/ML   CBC with Auto Differential    Collection Time: 02/07/23  9:42 AM   Result Value Ref Range    WBC 5.1 4.3 - 11.1 K/uL    RBC 4.44 4.05 - 5.2 M/uL    Hemoglobin 12.7 11.7 - 15.4 g/dL    Hematocrit 37.8 35.8 - 46.3 %    MCV 85.1 82 - 102 FL    MCH 28.6 26.1 - 32.9 PG    MCHC 33.6 31.4 - 35.0 g/dL    RDW 13.1 11.9 - 14.6 %    Platelets 061 (L) 708 - 450 K/uL    MPV 10.1 9.4 - 12.3 FL    nRBC 0.00 0.0 - 0.2 K/uL    Differential Type PENDING          ASSESSMENT:  Patient Active Problem List   Diagnosis    Elevated liver function tests    Dyslipidemia    Weight gain    Renal insufficiency    Malignant neoplasm of overlapping sites of right breast in female, estrogen receptor positive ([facility] [address]    Primary hypertension    Ductal carcinoma in situ of right breast    Breast cancer in female [facility])    Neutropenic fever ([facility] [address]      Ms. Minh Urrutia is a 75 y.o. female admitted on 2/6/2023 with a primary diagnosis of neutropenic fever. Mrs Minh Urrutia presented to the office today for tox check s/p cycle 1 TC with G-CSF. She reports minimal nausea and diarrhea x 1 episode in the past week. The past few days she reports oral sensitivity and sore throat, + thrush on exam.  Fatigue is present but mangeable. There is no cough, shortness of breath, or edema. Since her G-CSF injection she has experienced arthralgias/myalgias and has been taking claritin daily along with a few doses of ibuprofen. On arrival to [address] [place] [address] today she had a temperature of 102.4 and mild tachycardia. She didn't realize she had a fever and there are no other symptoms. Labs reviewed and ANC 0.1, therefore she will be admitted for IV antibiotics.       PLAN:  ER/MI+ Breast Cancer  -s/p C1 TC with G-CSF completed 1/31/23    Neutropenic Fever  -BC/UC obtained at , dose of Cefepime at CC, will obtain CXR and vital panel once inpatient and continue Cef/Vanc  2/7 BC, UC NGTD. Continue Cefe/Vanc. RVP negative. CXR with scattered interstitial changes throughout lungs bilaterally, inflammatory vs infectious, but no noted consolidation. ANC up to 2500. Thrush  -start nystatin swish and swallow    Nausea  -anti-emetics prn    Arthralgia/Myalgias   -could be attributes to G-CSF and/or fever, Tylenol prn    Lovenox for DVT prophylaxis  Supportive Care  Gabe SOPs    Dispo - pending clinical course. Hopefully home tomorrow if counts stable, afebrile and BC NGTD. DERIK Franz - [address] Hematology & Oncology  [address]  Office : [phone]   I personally saw, exammed and counselled the patient, and discussed with NP, agree with above history/assessment/plan. 75 y. o.female ER positive breast cancer T2N0 status post adjuvant TC x1, admitted for neutropenic fever, culture pending, ANC up to 2500 status post G-CSF, continue above care, nystatin swish and spit for thrush, antiemetics, supportive care. Monica Ortiz M.D.   [address], [address] [place] [address]  Office : [phone]  Fax : [phone]

## 2023-02-07 NOTE — PROGRESS NOTES
[facility] Pharmacokinetic Monitoring Service - Vancomycin    Consulting Provider: Mikayla Fletcher   Indication: febrile neutropenia  Target Concentration: Goal AUC/QUENTIN 400-600 mg*hr/L  Day of Therapy: 1  Additional Antimicrobials: cefepime    Patient eligible for piperacillin-tazobactam to cefepime auto-substitution per P&T approved protocol? N/A    Pertinent Laboratory Values: Wt Readings from Last 1 Encounters:   02/06/23 171 lb 8.3 oz (77.8 kg)     Temp Readings from Last 1 Encounters:   02/07/23 98.4 °F (36.9 °C) (Oral)     Recent Labs     02/06/23  1310   BUN 12   CREATININE 0.80   WBC 1.5*     Estimated Creatinine Clearance: 65 mL/min (based on SCr of 0.8 mg/dL). Lab Results   Component Value Date/Time    VANCORANDOM <0.8 02/07/2023 12:27 AM       MRSA Nasal Swab: N/A.  Non-respiratory infection      Assessment:  Date/Time Dose Concentration AUC         Note: Serum concentrations collected for AUC dosing may appear elevated if collected in close proximity to the dose administered, this is not necessarily an indication of toxicity    Plan:  Dosing recommendations based on Bayesian software  Start vancomycin 2g IV x 1 dose; followed by Vanc  Anticipated AUC of 449 and trough concentration of 14.7 at steady state  Renal labs as indicated   Vancomycin concentration ordered for 2/8 @ 0600    Pharmacy will continue to monitor patient and adjust therapy as indicated    Thank you for the consult,  [address] [place] [address] [facility]

## 2023-02-08 VITALS
WEIGHT: 170.31 LBS | SYSTOLIC BLOOD PRESSURE: 114 MMHG | HEART RATE: 82 BPM | TEMPERATURE: 97.9 F | RESPIRATION RATE: 16 BRPM | BODY MASS INDEX: 31.34 KG/M2 | HEIGHT: 62 IN | DIASTOLIC BLOOD PRESSURE: 58 MMHG | OXYGEN SATURATION: 93 %

## 2023-02-08 LAB
ALBUMIN SERPL-MCNC: 2.6 G/DL (ref 3.2–4.6)
ALBUMIN/GLOB SERPL: 0.9 (ref 0.4–1.6)
ALP SERPL-CCNC: 73 U/L (ref 50–136)
ALT SERPL-CCNC: 36 U/L (ref 12–65)
ANION GAP SERPL CALC-SCNC: 9 MMOL/L (ref 2–11)
AST SERPL-CCNC: 29 U/L (ref 15–37)
BASOPHILS # BLD: 0 K/UL (ref 0–0.2)
BASOPHILS NFR BLD: 0 % (ref 0–2)
BILIRUB SERPL-MCNC: 0.3 MG/DL (ref 0.2–1.1)
BUN SERPL-MCNC: 10 MG/DL (ref 8–23)
CALCIUM SERPL-MCNC: 8 MG/DL (ref 8.3–10.4)
CHLORIDE SERPL-SCNC: 109 MMOL/L (ref 101–110)
CO2 SERPL-SCNC: 22 MMOL/L (ref 21–32)
CREAT SERPL-MCNC: 0.7 MG/DL (ref 0.6–1)
DIFFERENTIAL METHOD BLD: ABNORMAL
EOSINOPHIL # BLD: 0 K/UL (ref 0–0.8)
EOSINOPHIL NFR BLD: 0 % (ref 0.5–7.8)
ERYTHROCYTE [DISTWIDTH] IN BLOOD BY AUTOMATED COUNT: 13.2 % (ref 11.9–14.6)
GLOBULIN SER CALC-MCNC: 2.8 G/DL (ref 2.8–4.5)
GLUCOSE SERPL-MCNC: 110 MG/DL (ref 65–100)
HCT VFR BLD AUTO: 37.9 % (ref 35.8–46.3)
HGB BLD-MCNC: 12.3 G/DL (ref 11.7–15.4)
IMM GRANULOCYTES # BLD AUTO: 3.6 K/UL (ref 0–0.5)
IMM GRANULOCYTES NFR BLD AUTO: 19 % (ref 0–5)
LYMPHOCYTES # BLD: 3.1 K/UL (ref 0.5–4.6)
LYMPHOCYTES NFR BLD: 16 % (ref 13–44)
MCH RBC QN AUTO: 28 PG (ref 26.1–32.9)
MCHC RBC AUTO-ENTMCNC: 32.5 G/DL (ref 31.4–35)
MCV RBC AUTO: 86.1 FL (ref 82–102)
MONOCYTES # BLD: 2.1 K/UL (ref 0.1–1.3)
MONOCYTES NFR BLD: 11 % (ref 4–12)
NEUTS SEG # BLD: 10.3 K/UL (ref 1.7–8.2)
NEUTS SEG NFR BLD: 54 % (ref 43–78)
NRBC # BLD: 0.04 K/UL (ref 0–0.2)
PLATELET # BLD AUTO: 179 K/UL (ref 150–450)
PLATELET COMMENT: ADEQUATE
PMV BLD AUTO: 10.1 FL (ref 9.4–12.3)
POTASSIUM SERPL-SCNC: 4 MMOL/L (ref 3.5–5.1)
PROT SERPL-MCNC: 5.4 G/DL (ref 6.3–8.2)
RBC # BLD AUTO: 4.4 M/UL (ref 4.05–5.2)
RBC MORPH BLD: ABNORMAL
SODIUM SERPL-SCNC: 140 MMOL/L (ref 133–143)
VANCOMYCIN SERPL-MCNC: 21.3 UG/ML
WBC # BLD AUTO: 19.1 K/UL (ref 4.3–11.1)
WBC MORPH BLD: ABNORMAL

## 2023-02-08 PROCEDURE — APPSS60 APP SPLIT SHARED TIME 46-60 MINUTES: Performed by: NURSE PRACTITIONER

## 2023-02-08 PROCEDURE — 80053 COMPREHEN METABOLIC PANEL: CPT

## 2023-02-08 PROCEDURE — 6360000002 HC RX W HCPCS: Performed by: NURSE PRACTITIONER

## 2023-02-08 PROCEDURE — 99239 HOSP IP/OBS DSCHRG MGMT >30: CPT | Performed by: INTERNAL MEDICINE

## 2023-02-08 PROCEDURE — 6370000000 HC RX 637 (ALT 250 FOR IP): Performed by: NURSE PRACTITIONER

## 2023-02-08 PROCEDURE — 2580000003 HC RX 258: Performed by: NURSE PRACTITIONER

## 2023-02-08 PROCEDURE — 80202 ASSAY OF VANCOMYCIN: CPT

## 2023-02-08 PROCEDURE — 85025 COMPLETE CBC W/AUTO DIFF WBC: CPT

## 2023-02-08 PROCEDURE — 36415 COLL VENOUS BLD VENIPUNCTURE: CPT

## 2023-02-08 RX ORDER — AZITHROMYCIN 500 MG/1
500 TABLET, FILM COATED ORAL DAILY
Qty: 3 TABLET | Refills: 0 | Status: SHIPPED | OUTPATIENT
Start: 2023-02-08 | End: 2023-02-11

## 2023-02-08 RX ADMIN — NYSTATIN 500000 UNITS: 100000 SUSPENSION ORAL at 12:21

## 2023-02-08 RX ADMIN — SODIUM CHLORIDE, PRESERVATIVE FREE 10 ML: 5 INJECTION INTRAVENOUS at 08:47

## 2023-02-08 RX ADMIN — CEFEPIME 2000 MG: 2 INJECTION, POWDER, FOR SOLUTION INTRAVENOUS at 08:43

## 2023-02-08 RX ADMIN — LISINOPRIL 10 MG: 5 TABLET ORAL at 08:43

## 2023-02-08 RX ADMIN — VANCOMYCIN HYDROCHLORIDE 750 MG: 750 INJECTION, POWDER, LYOPHILIZED, FOR SOLUTION INTRAVENOUS at 03:49

## 2023-02-08 RX ADMIN — ENOXAPARIN SODIUM 40 MG: 100 INJECTION SUBCUTANEOUS at 08:43

## 2023-02-08 RX ADMIN — NYSTATIN 500000 UNITS: 100000 SUSPENSION ORAL at 08:43

## 2023-02-08 NOTE — PLAN OF CARE
Problem: ABCDS Injury Assessment  Goal: Absence of physical injury  Outcome: Progressing  Flowsheets (Taken 2/8/2023 0349 by Carolina Lamar, RN)  Absence of Physical Injury: Implement safety measures based on patient assessment     Problem: Safety - Adult  Goal: Free from fall injury  Outcome: Progressing  Flowsheets  Taken 2/8/2023 0835 by Authur Harada, RN  Free From Fall Injury: Instruct family/caregiver on patient safety  Taken 2/8/2023 0349 by Carolina Lamar RN  Free From Fall Injury: Instruct family/caregiver on patient safety

## 2023-02-08 NOTE — DISCHARGE SUMMARY
[facility] Hematology & Oncology: Inpatient Hematology / Oncology Discharge Summary Note    Patient ID:  Yue Monk  [id number]  75 y.o.  1955    Admit Date: 2/6/2023    Discharge Date: 2/8/2023    Admission Diagnoses: Neutropenic fever ([address] [D70.9, R50.81]    Discharge Diagnoses:  Principal Diagnosis: Neutropenic fever ([address]  Principal Problem:    Neutropenic fever ([address]  Active Problems:    Breast cancer in female [facility])    Thrush  Resolved Problems:    * No resolved hospital problems. [facility] Course:    Ms. Mauricio Catalan is a 75 y.o. female admitted on 2/6/2023 with a primary diagnosis of neutropenic fever. Mrs Mauricio Catalan presented to the office today for tox check s/p cycle 1 TC with G-CSF. She reports minimal nausea and diarrhea x 1 episode in the past week. The past few days she reports oral sensitivity and sore throat, + thrush on exam.  Fatigue is present but mangeable. There is no cough, shortness of breath, or edema. Since her G-CSF injection she has experienced arthralgias/myalgias and has been taking claritin daily along with a few doses of ibuprofen. On arrival to [address] [place] [address] today she had a temperature of 102.4 and mild tachycardia. She didn't realize she had a fever and there are no other symptoms. Labs reviewed and ANC 0.1, therefore she will be admitted for IV antibiotics. While admitted she completed broad-spectrum abx. CXR with scattered interstitial changes throughout lung BL, inflammatory vs infectious but no definitive consolidation. She will complete course of Azithromycin as OP. RVP negative. BC and UC NGTD. Today, ANC up to 10k, WBC 19k. She has remained afebrile. She is now ready for DC. She will need to follow-up as scheduled on 2/20 with NP for next TC. She knows to call with questions or concerns, including fever or symptoms not managed with medications.     Consults:  IP CONSULT TO PHARMACY    Pertinent Diagnostic Studies:   Labs:    Recent Labs     02/06/23  1310 02/07/23  0942 02/08/23  0630   WBC 1.5* 5.1 19.1*   HGB 13.7 12.7 12.3   * 147* 179      Recent Labs     02/06/23  1310 02/07/23  0942 02/08/23  0630    135 140   K 4.5 4.0 4.0    106 109   CO2 28 23 22   BUN 12 13 10   MG  --  2.0  --        Imaging:    Xray Result (most recent):  XR CHEST PORTABLE 02/06/2023    Narrative  Chest one view. DATE: 2/6/2023. COMPARISON: None available. CLINICAL HISTORY: Neutropenic fever. Impression  There are scattered interstitial changes seen throughout the lungs bilaterally  which may be inflammatory or infectious. No focal consolidation is otherwise  seen. The cardiac silhouette and pulmonary vessels otherwise appear to be within  normal limits. Lor Perdomo D.O.  2/7/2023 12:44:00 AM         Medication List        START taking these medications      azithromycin 500 MG tablet  Commonly known as: ZITHROMAX  Take 1 tablet by mouth daily for 3 days            CONTINUE taking these medications      acetaminophen 325 MG tablet  Commonly known as: TYLENOL  Take 2 tablets by mouth every 4 hours as needed for Pain (over the counter medication. May take for pain or alternate with Ibuprofen)     Fish Oil 1200 MG Caps     ibuprofen 600 MG tablet  Commonly known as: ADVIL;MOTRIN  Take 1 tablet by mouth 4 times daily as needed for Pain (May alternate with Tylenol for pain. OVER THE COUNTER MEDICATION)     lidocaine-prilocaine 2.5-2.5 % cream  Commonly known as: EMLA  Apply topically as needed.      lisinopril 10 MG tablet  Commonly known as: PRINIVIL;ZESTRIL  Take 1 tablet by mouth daily     MULTIVITAMIN ADULTS 50+ PO     ondansetron 4 MG disintegrating tablet  Commonly known as: ZOFRAN-ODT  Take 1 tablet by mouth 3 times daily as needed for Nausea or Vomiting     prochlorperazine 10 MG tablet  Commonly known as: COMPAZINE  Take 1 tablet by mouth every 6 hours as needed (nausea)     rosuvastatin 20 MG tablet  Commonly known as: Crestor  Take 1 tablet by mouth nightly            STOP taking these medications      ondansetron 4 MG tablet  Commonly known as: Елена Bennett               Where to Get Your Medications        These medications were sent to [facility]/pharmacy #[id number], [address] [phone] [address], [address] [address]      Phone: [phone]   azithromycin 500 MG tablet             OBJECTIVE:  Patient Vitals for the past 8 hrs:   BP Temp Temp src Pulse Resp SpO2   23 0756 119/62 98.2 °F (36.8 °C) Oral 81 -- 93 %   23 0340 124/68 98.8 °F (37.1 °C) Oral 86 17 96 %     Temp (24hrs), Av.1 °F (37.3 °C), Min:98.2 °F (36.8 °C), Max:100 °F (37.8 °C)    No intake/output data recorded. Physical Exam:  Constitutional: Well developed, well nourished female in no acute distress, sitting comfortably on the hospital bed. HEENT: Normocephalic and atraumatic. Oropharynx is clear, mucous membranes are moist.  Extraocular muscles are intact. Sclerae anicteric. Neck supple without JVD. No thyromegaly present. Skin Warm and dry. No bruising and no rash noted. No erythema. No pallor. Respiratory Lungs are clear to auscultation bilaterally without wheezes, rales or rhonchi, normal air exchange without accessory muscle use. CVS Normal rate, regular rhythm and normal S1 and S2. No murmurs, gallops, or rubs. Abdomen Soft, nontender and nondistended, normoactive bowel sounds. No palpable mass. No hepatosplenomegaly. Neuro Grossly nonfocal with no obvious sensory or motor deficits. MSK Normal range of motion in general.  No edema and no tenderness. Psych Appropriate mood and affect. ASSESSMENT:    Principal Problem:    Neutropenic fever ([address]  Active Problems:    Breast cancer in female [facility])    Thrush  Resolved Problems:    * No resolved hospital problems. *      DISPOSITION:    [place] home. FU with NP as scheduled.       Over 45 minutes was spent in discharge planning and coordination of care. Garrison Higginbotham APRJEANA [address] Hematology & Oncology  [address] [address]  Office : [phone]  Fax : [phone]   I personally saw, exammed and counselled the patient, and discussed with NP, agree with above history/assessment/plan. 75 y. o.female ER positive breast cancer T2N0 status post adjuvant TC x1, admitted for neutropenic fever, culture pending, ANC up  status post G-CSF, nystatin swish and spit for thrush, antiemetics, DC with azithromycin and follow in clinic as scheduled. Jose Vincent M.D.   [address]  Office : [phone]  Fax : [phone]

## 2023-02-08 NOTE — PROGRESS NOTES
Physician Progress Note      Emerald Lewis  [facility] #:                  [id number]  :                       1955  ADMIT DATE:       2023 10:51 PM  [address] [place] DATE:  Jose Marinelli  PROVIDER #:        Elidia IGLESIAS          QUERY TEXT:    Patient admitted with neutropenic fever and noted to have wbc of 1.5, temp of   102.4, pulse 109 . If possible, please document in progress notes and   discharge summary if you are evaluating and/or treating any of the following: The medical record reflects the following:  Risk Factors: breast cancer receiving chemo, thrush, neutropenia  Clinical Indicators: wbc 1.5, temp 102.4, pulse 104. cxray--inflammatory vs   infectious--but no noted consolidation  Treatment: G-CSF, IV Cef/Vanc, cxray, nystatin swish and swallow, labs, cxray,   ibuprofen. essential home meds. Options provided:  -- SIRS of non-infectious origin due to breast cancer /chemo without acute   organ dysfunction  -- Other - I will add my own diagnosis  -- Disagree - Not applicable / Not valid  -- Disagree - Clinically unable to determine / Unknown  -- Refer to Clinical Documentation Reviewer    PROVIDER RESPONSE TEXT:    This patient has SIRS of non-infectious origin due to breast cancer/chemo   without acute organ dysfunction.     Query created by: Jes Garcia on 2023 11:55 AM      Electronically signed by:  Elidia IGLESIAS 2023 12:12 PM

## 2023-02-08 NOTE — CARE COORDINATION
Pt is for discharge home today with no needs/supportive care orders received for CM at this time. Pt will have close follow up at the [facility] [address]    Milestones met    ASSESSMENT NOTE    Attending Physician: Rose Nelson MD  Admit Problem: Neutropenic fever ([facility] [address] [D70.9, R50.81]  Date/Time of Admission: 2/6/2023 10:51 PM  Problem List:  Patient Active Problem List   Diagnosis    Elevated liver function tests    Dyslipidemia    Weight gain    Renal insufficiency    Malignant neoplasm of overlapping sites of right breast in female, estrogen receptor positive ([facility] [address]    Primary hypertension    Ductal carcinoma in situ of right breast    Breast cancer in female [facility])    Neutropenic fever ([facility] [address]    Thrush       Service Assessment  Patient Orientation Alert and Oriented   Cognition Alert   History Provided By Medical Record   Primary Caregiver Self   Accompanied By/Relationship     [address] is: Legal Next of Annie Rowell   PCP Verified by CM Yes   Last Visit to PCP Within last 3 months   Prior Functional Level Independent in ADLs/IADLs   Current Functional Level Independent in ADLs/IADLs   Can patient return to prior living arrangement Yes   Ability to make needs known: Good   Family able to assist with home care needs: Yes   Would you like for me to discuss the discharge plan with any other family members/significant others, and if so, who?  Yes   Financial Resources     Community Resources     CM/SW Referral       Social/Functional History  Lives With     Type of [address] [place] [address] Access     Entrance Stairs - Number of Steps     Entrance Stairs - Rails     Bathroom Shower/Tub     Bathroom Toilet Standard   Bathroom Equipment     Bathroom Accessibility     Home Equipment     Receives Help From Family   ADL Assistance Independent   Bath     Dressing     Grooming     Feeding     Toileting     [place]   Meal Prep     [employer] [address]     Other (Comment)     Homemaking Responsibilities     Meal Prep Responsibility     Laundry Responsibility     Cleaning Responsibility     Bill Paying/Finance [address]     Other (Comment)     Ambulation Assistance Independent   Transfer Assistance Independent   Active  Yes   Patient's  Info     Mode of Transportation Car   Education     Occupation     Type of Occupation       Discharge Planning   Type of [address] Prior To Admission     Potential Assistance Needed N/A   DME     DME     DME Ordered? No   Potential Assistance Purchasing Medications No   Meds-to-Beds: Does the patient want to have any new prescriptions delivered to bedside prior to discharge? Type of Home Care Services     Patient expects to be discharged to: Follow Up Appointment: Best Day/Time     One/Two Story Residence:     # of Interior Steps     Height of Each Step (in)     [employer] Available     History of Falls? Services At/After Discharge  Transition of Care Consult (CM Consult): N/A   Internal Home Health     Internal Hospice     Reason Outside Agency [address]     Partner SNF     Reason Why Partner SNF Not Chosen     Internal Comfort Care     Reason Outside [address] Discharge None   [place] Resource Information Provided? No   Mode of Transport at Discharge [address] [facility] Time of Discharge     Confirm Follow Up Transport Family     Condition of Participation: Discharge Planning  The plan for Transition of Care is related to the following treatment goals: return to baseline   The Patient and/or Patient Representative was provided with a Choice of Provider?      Name of the Patient Representative who was provided with the Choice of Provider and agrees with the Discharge Plan? The Patient and/or Patient Representative Agree with the Discharge Plan? Freedom of Choice list was provided with basic dialogue that supports the individualized plan of care/goals, treatment preferences, and shares the quality data associated with the providers?  Yes       Flower Tsang RN 02/08/23 2:59 PM

## 2023-02-08 NOTE — PLAN OF CARE
Problem: ABCDS Injury Assessment  Goal: Absence of physical injury  2/8/2023 1236 by Lexis Jose RN  Outcome: Completed  2/8/2023 1203 by Lexis Jose RN  Outcome: Progressing  Flowsheets (Taken 2/8/2023 0349 by Kaykay Newton RN)  Absence of Physical Injury: Implement safety measures based on patient assessment     Problem: Safety - Adult  Goal: Free from fall injury  2/8/2023 1236 by Lexis Jose RN  Outcome: Completed  2/8/2023 1203 by Lexis Jose RN  Outcome: Progressing  Flowsheets  Taken 2/8/2023 0835 by Lexis Jose RN  Free From Fall Injury: Instruct family/caregiver on patient safety  Taken 2/8/2023 0349 by Kaykay Newton RN  Free From Fall Injury: Instruct family/caregiver on patient safety

## 2023-02-09 ENCOUNTER — TELEPHONE (OUTPATIENT)
Dept: ONCOLOGY | Age: 68
End: 2023-02-09

## 2023-02-09 LAB
BACTERIA SPEC CULT: NORMAL
SERVICE CMNT-IMP: NORMAL

## 2023-02-09 NOTE — TELEPHONE ENCOUNTER
Spoke to Erik. She states she still is a bit \"fuzzy\" to her tongue. she states they gave her medication in the hospital that helped but did not have a prescription on discharge. Reviewed with AMITA Crocker. Prescription sent in for nystatin for 5-7 more days. Call placed back to patient to notify the prescription has been sent. She appreciated it.

## 2023-02-09 NOTE — TELEPHONE ENCOUNTER
Pt called regarding thrush medication. She was told in her visit that she would be getting prescribed medication for thrush, but was then admitted. She was administered the medication in the hospital, but wanted to know if she needed to continue this after being discharged?

## 2023-02-11 LAB
BACTERIA SPEC CULT: NORMAL
SERVICE CMNT-IMP: NORMAL

## 2023-02-13 NOTE — PROGRESS NOTES
HISTORY OF PRESENT ILLNESS  Chief Complaint   Patient presents with    Medication Refill       Hasn't done the cologuard yet. Going through chemo. Was hospitalized for neutropenic fever. Previously said, \" While admitted she completed broad-spectrum abx. CXR with scattered interstitial changes throughout lung BL, inflammatory vs infectious but no definitive consolidation. She will complete course of Azithromycin as OP. RVP negative. BC and UC NGTD. Today, ANC up to 10k, WBC 19k. She has remained afebrile. She is now ready for DC. She will need to follow-up as scheduled on 2/20 with NP for next TC. \"    Subjective:   Miriam Boggs is a 75 y.o. female with hypertension. Hypertension ROS: taking medications as instructed, no medication side effects noted, no TIA's, no chest pain on exertion, no dyspnea on exertion, no swelling of ankles. Key CAD CHF Meds            lisinopril (PRINIVIL;ZESTRIL) 10 MG tablet (Taking)    Sig - Route: Take 1 tablet by mouth daily - Oral    rosuvastatin (CRESTOR) 20 MG tablet (Taking)    Sig - Route: Take 1 tablet by mouth nightly - Oral    Notes to Pharmacy: Please notify the patient that the prescriptions have been sent in.            Lab Results   Component Value Date/Time     02/08/2023 06:30 AM    K 4.0 02/08/2023 06:30 AM     02/08/2023 06:30 AM    CO2 22 02/08/2023 06:30 AM    BUN 10 02/08/2023 06:30 AM    CREATININE 0.70 02/08/2023 06:30 AM    GLUCOSE 110 02/08/2023 06:30 AM    CALCIUM 8.0 02/08/2023 06:30 AM       Lab Results   Component Value Date    CREATININE 0.70 02/08/2023      Lab Results   Component Value Date    CHOL 128 11/11/2022    CHOL 256 (H) 07/27/2022    CHOL 230 (H) 07/01/2021     Lab Results   Component Value Date    TRIG 116 11/11/2022    TRIG 161 (H) 07/27/2022    TRIG 95 07/01/2021     Lab Results   Component Value Date    HDL 47 11/11/2022    HDL 42 07/27/2022    HDL 48 07/01/2021     Lab Results   Component Value Date LDLCALC 57.8 11/11/2022    LDLCALC 181.8 (H) 07/27/2022    LDLCALC 165 (H) 07/01/2021     Lab Results   Component Value Date    LABVLDL 23.2 (H) 11/11/2022    LABVLDL 32.2 (H) 07/27/2022    LABVLDL 25 08/14/2019    VLDL 17 07/01/2021     Lab Results   Component Value Date    CHOLHDLRATIO 2.7 11/11/2022    CHOLHDLRATIO 6.1 07/27/2022      Lab Results   Component Value Date    LABMICR See additional order 08/14/2019      BP Readings from Last 3 Encounters:   02/14/23 131/83   02/08/23 (!) 114/58   02/06/23 127/81        Worse with stress, salt. Improved with exercise, medication. She is  monitoring blood pressures. Wt Readings from Last 3 Encounters:   02/14/23 172 lb (78 kg)   02/07/23 170 lb 5 oz (77.3 kg)   02/06/23 172 lb 1.6 oz (78.1 kg)        Medication Refill  Pertinent negatives include no abdominal pain, arthralgias, chest pain, chills, congestion, coughing, fatigue, fever, headaches, myalgias, nausea, neck pain or vomiting. Hypertension  This is a chronic problem. The current episode started more than 1 year ago. The problem is unchanged. The problem is controlled. Pertinent negatives include no anxiety, blurred vision, chest pain, headaches, malaise/fatigue, neck pain, orthopnea, palpitations, peripheral edema, PND, shortness of breath or sweats. There are no associated agents to hypertension. Risk factors for coronary artery disease include post-menopausal state, obesity, sedentary lifestyle and family history. Past treatments include angiotensin blockers. The current treatment provides significant improvement. Compliance problems include exercise. Review of Systems   Constitutional:  Negative for activity change, appetite change, chills, fatigue, fever and malaise/fatigue. HENT:  Negative for congestion and rhinorrhea. Eyes:  Negative for blurred vision. Respiratory:  Negative for cough, shortness of breath and wheezing.     Cardiovascular:  Negative for chest pain, palpitations, orthopnea and PND. Gastrointestinal:  Negative for abdominal pain, constipation, diarrhea, nausea and vomiting. Genitourinary:  Negative for dysuria. Musculoskeletal:  Negative for arthralgias, myalgias and neck pain. Neurological:  Negative for dizziness and headaches. Psychiatric/Behavioral:  Negative for dysphoric mood. The patient is not nervous/anxious. Patient Active Problem List   Diagnosis    Elevated liver function tests    Dyslipidemia    Weight gain    Renal insufficiency    Malignant neoplasm of overlapping sites of right breast in female, estrogen receptor positive ([facility] [address]    Primary hypertension    Ductal carcinoma in situ of right breast    Breast cancer in female [facility])    Neutropenic fever ([facility] [address]    Thrush         Blood pressure 131/83, pulse 80, temperature 98 °F (36.7 °C), temperature source Temporal, resp. rate 16, height 5' 2\" (1.575 m), weight 172 lb (78 kg), SpO2 97 %. Pain Scale: 0 - No pain/10 Pain Location:   Body mass index is 31.46 kg/m². Physical Exam  Vitals and nursing note reviewed. Constitutional:       General: She is not in acute distress. Appearance: Normal appearance. She is normal weight. She is not toxic-appearing. HENT:      Head: Normocephalic and atraumatic. Eyes:      General: Lids are normal.      Extraocular Movements: Extraocular movements intact. Conjunctiva/sclera: Conjunctivae normal.      Pupils: Pupils are equal.   Cardiovascular:      Rate and Rhythm: Normal rate and regular rhythm. Heart sounds: Normal heart sounds. No murmur heard. No friction rub. No gallop. Pulmonary:      Effort: Pulmonary effort is normal. No respiratory distress. Breath sounds: Normal breath sounds. No wheezing or rales. Musculoskeletal:      Right lower leg: No edema. Left lower leg: No edema. Skin:     General: Skin is warm and dry. Neurological:      General: No focal deficit present. Mental Status: She is alert.    Psychiatric: Mood and Affect: Mood normal.         Behavior: Behavior normal.         Thought Content: Thought content normal.         Judgment: Judgment normal.      Outside records were obtained and reviewed, including notes and any lab or x-ray reports. CXR unremarkable. ASSESSMENT and PLAN   Diagnosis Orders   1. Primary hypertension  lisinopril (PRINIVIL;ZESTRIL) 10 MG tablet      2. Neutropenic fever (HCC)  XR CHEST PA LAT (2 VIEWS)      3. Leukocytosis, unspecified type        4. Abnormal CXR        5. Screen for colon cancer        6. Abnormal glucose            Reviewed medications and side effects in detail    Needs to do cologuard once chemo calms down. Her other chronic conditions are stable at this time. SHe continues to be followed by her previous specialists for the above chronic issues. She is stable on the current treatment and tolerating it well. No significant sides effects. Will not adjust therapy at this time, unless noted above. We will continue to monitor for any problems. Medications refilled and lab work has been ordered where needed. Reviewed medications are explained including any potential interactions or side effects in detail. The patient's questions were answered. She  understands the above and has no further questions. Further workup and treatment should be done if symptoms persist, worsen or new symptoms occur. She  will call to notify us of any problems, complications or worsening symptoms. Follow-up and Dispositions    Return in about 6 months (around 8/14/2023) for AWE. There are no Patient Instructions on file for this visit. (Some details in this note may have been created with speech-recognition software. Some errors in speech recognition may have occurred.    Most of those have been corrected but some may have been missed.)         Alexandra Ramirez MD  [address] [place] [address]  Phone [phone]

## 2023-02-14 ENCOUNTER — OFFICE VISIT (OUTPATIENT)
Dept: FAMILY MEDICINE CLINIC | Facility: CLINIC | Age: 68
End: 2023-02-14
Payer: MEDICARE

## 2023-02-14 VITALS
HEIGHT: 62 IN | OXYGEN SATURATION: 97 % | DIASTOLIC BLOOD PRESSURE: 83 MMHG | BODY MASS INDEX: 31.65 KG/M2 | HEART RATE: 80 BPM | SYSTOLIC BLOOD PRESSURE: 131 MMHG | RESPIRATION RATE: 16 BRPM | TEMPERATURE: 98 F | WEIGHT: 172 LBS

## 2023-02-14 DIAGNOSIS — R50.81 NEUTROPENIC FEVER (HCC): ICD-10-CM

## 2023-02-14 DIAGNOSIS — I10 PRIMARY HYPERTENSION: Primary | ICD-10-CM

## 2023-02-14 DIAGNOSIS — D72.829 LEUKOCYTOSIS, UNSPECIFIED TYPE: ICD-10-CM

## 2023-02-14 DIAGNOSIS — R93.89 ABNORMAL CXR: ICD-10-CM

## 2023-02-14 DIAGNOSIS — R73.09 ABNORMAL GLUCOSE: ICD-10-CM

## 2023-02-14 DIAGNOSIS — Z12.11 SCREEN FOR COLON CANCER: ICD-10-CM

## 2023-02-14 DIAGNOSIS — D70.9 NEUTROPENIC FEVER (HCC): ICD-10-CM

## 2023-02-14 PROCEDURE — 3074F SYST BP LT 130 MM HG: CPT | Performed by: FAMILY MEDICINE

## 2023-02-14 PROCEDURE — 1111F DSCHRG MED/CURRENT MED MERGE: CPT | Performed by: FAMILY MEDICINE

## 2023-02-14 PROCEDURE — G8417 CALC BMI ABV UP PARAM F/U: HCPCS | Performed by: FAMILY MEDICINE

## 2023-02-14 PROCEDURE — 3078F DIAST BP <80 MM HG: CPT | Performed by: FAMILY MEDICINE

## 2023-02-14 PROCEDURE — 99214 OFFICE O/P EST MOD 30 MIN: CPT | Performed by: FAMILY MEDICINE

## 2023-02-14 PROCEDURE — G8399 PT W/DXA RESULTS DOCUMENT: HCPCS | Performed by: FAMILY MEDICINE

## 2023-02-14 PROCEDURE — 3017F COLORECTAL CA SCREEN DOC REV: CPT | Performed by: FAMILY MEDICINE

## 2023-02-14 PROCEDURE — G8427 DOCREV CUR MEDS BY ELIG CLIN: HCPCS | Performed by: FAMILY MEDICINE

## 2023-02-14 PROCEDURE — 1090F PRES/ABSN URINE INCON ASSESS: CPT | Performed by: FAMILY MEDICINE

## 2023-02-14 PROCEDURE — 1123F ACP DISCUSS/DSCN MKR DOCD: CPT | Performed by: FAMILY MEDICINE

## 2023-02-14 PROCEDURE — 1036F TOBACCO NON-USER: CPT | Performed by: FAMILY MEDICINE

## 2023-02-14 PROCEDURE — G8484 FLU IMMUNIZE NO ADMIN: HCPCS | Performed by: FAMILY MEDICINE

## 2023-02-14 RX ORDER — LISINOPRIL 10 MG/1
10 TABLET ORAL DAILY
Qty: 90 TABLET | Refills: 1 | Status: SHIPPED | OUTPATIENT
Start: 2023-02-14

## 2023-02-14 SDOH — ECONOMIC STABILITY: INCOME INSECURITY: HOW HARD IS IT FOR YOU TO PAY FOR THE VERY BASICS LIKE FOOD, HOUSING, MEDICAL CARE, AND HEATING?: NOT HARD AT ALL

## 2023-02-14 SDOH — ECONOMIC STABILITY: FOOD INSECURITY: WITHIN THE PAST 12 MONTHS, YOU WORRIED THAT YOUR FOOD WOULD RUN OUT BEFORE YOU GOT MONEY TO BUY MORE.: NEVER TRUE

## 2023-02-14 SDOH — ECONOMIC STABILITY: FOOD INSECURITY: WITHIN THE PAST 12 MONTHS, THE FOOD YOU BOUGHT JUST DIDN'T LAST AND YOU DIDN'T HAVE MONEY TO GET MORE.: NEVER TRUE

## 2023-02-14 ASSESSMENT — PATIENT HEALTH QUESTIONNAIRE - PHQ9
SUM OF ALL RESPONSES TO PHQ QUESTIONS 1-9: 0
SUM OF ALL RESPONSES TO PHQ QUESTIONS 1-9: 0
2. FEELING DOWN, DEPRESSED OR HOPELESS: 0
SUM OF ALL RESPONSES TO PHQ QUESTIONS 1-9: 0
SUM OF ALL RESPONSES TO PHQ9 QUESTIONS 1 & 2: 0
1. LITTLE INTEREST OR PLEASURE IN DOING THINGS: 0
SUM OF ALL RESPONSES TO PHQ QUESTIONS 1-9: 0

## 2023-02-14 ASSESSMENT — ENCOUNTER SYMPTOMS
ORTHOPNEA: 0
VOMITING: 0
SHORTNESS OF BREATH: 0
ABDOMINAL PAIN: 0
CONSTIPATION: 0
WHEEZING: 0
RHINORRHEA: 0
COUGH: 0
BLURRED VISION: 0
DIARRHEA: 0
NAUSEA: 0

## 2023-02-17 DIAGNOSIS — C50.811 MALIGNANT NEOPLASM OF OVERLAPPING SITES OF RIGHT BREAST IN FEMALE, ESTROGEN RECEPTOR POSITIVE (HCC): Primary | ICD-10-CM

## 2023-02-17 DIAGNOSIS — R79.9 ABNORMAL FINDING OF BLOOD CHEMISTRY, UNSPECIFIED: ICD-10-CM

## 2023-02-17 DIAGNOSIS — Z17.0 MALIGNANT NEOPLASM OF OVERLAPPING SITES OF RIGHT BREAST IN FEMALE, ESTROGEN RECEPTOR POSITIVE (HCC): Primary | ICD-10-CM

## 2023-02-20 ENCOUNTER — HOSPITAL ENCOUNTER (OUTPATIENT)
Dept: LAB | Age: 68
Discharge: HOME | End: 2023-02-23
Payer: MEDICARE

## 2023-02-20 ENCOUNTER — OFFICE VISIT (OUTPATIENT)
Dept: ONCOLOGY | Age: 68
End: 2023-02-20
Payer: MEDICARE

## 2023-02-20 VITALS
WEIGHT: 174.1 LBS | HEART RATE: 116 BPM | OXYGEN SATURATION: 97 % | TEMPERATURE: 98.3 F | RESPIRATION RATE: 16 BRPM | SYSTOLIC BLOOD PRESSURE: 114 MMHG | HEIGHT: 62 IN | DIASTOLIC BLOOD PRESSURE: 69 MMHG | BODY MASS INDEX: 32.04 KG/M2

## 2023-02-20 DIAGNOSIS — C50.811 MALIGNANT NEOPLASM OF OVERLAPPING SITES OF RIGHT BREAST IN FEMALE, ESTROGEN RECEPTOR POSITIVE (HCC): Primary | ICD-10-CM

## 2023-02-20 DIAGNOSIS — Z17.0 MALIGNANT NEOPLASM OF OVERLAPPING SITES OF RIGHT BREAST IN FEMALE, ESTROGEN RECEPTOR POSITIVE (HCC): Primary | ICD-10-CM

## 2023-02-20 DIAGNOSIS — C50.811 MALIGNANT NEOPLASM OF OVERLAPPING SITES OF RIGHT BREAST IN FEMALE, ESTROGEN RECEPTOR POSITIVE (HCC): ICD-10-CM

## 2023-02-20 DIAGNOSIS — Z17.0 MALIGNANT NEOPLASM OF OVERLAPPING SITES OF RIGHT BREAST IN FEMALE, ESTROGEN RECEPTOR POSITIVE (HCC): ICD-10-CM

## 2023-02-20 DIAGNOSIS — R79.9 ABNORMAL FINDING OF BLOOD CHEMISTRY, UNSPECIFIED: ICD-10-CM

## 2023-02-20 LAB
ALBUMIN SERPL-MCNC: 3.8 G/DL (ref 3.2–4.6)
ALBUMIN/GLOB SERPL: 1.2 (ref 0.4–1.6)
ALP SERPL-CCNC: 84 U/L (ref 50–136)
ALT SERPL-CCNC: 38 U/L (ref 12–65)
ANION GAP SERPL CALC-SCNC: 6 MMOL/L (ref 2–11)
AST SERPL-CCNC: 20 U/L (ref 15–37)
BASOPHILS # BLD: 0 K/UL (ref 0–0.2)
BASOPHILS NFR BLD: 0 % (ref 0–2)
BILIRUB SERPL-MCNC: 0.5 MG/DL (ref 0.2–1.1)
BUN SERPL-MCNC: 12 MG/DL (ref 8–23)
CALCIUM SERPL-MCNC: 8.9 MG/DL (ref 8.3–10.4)
CHLORIDE SERPL-SCNC: 107 MMOL/L (ref 101–110)
CO2 SERPL-SCNC: 26 MMOL/L (ref 21–32)
CREAT SERPL-MCNC: 0.9 MG/DL (ref 0.6–1)
DIFFERENTIAL METHOD BLD: ABNORMAL
EOSINOPHIL # BLD: 0 K/UL (ref 0–0.8)
EOSINOPHIL NFR BLD: 0 % (ref 0.5–7.8)
ERYTHROCYTE [DISTWIDTH] IN BLOOD BY AUTOMATED COUNT: 14.2 % (ref 11.9–14.6)
GLOBULIN SER CALC-MCNC: 3.1 G/DL (ref 2.8–4.5)
GLUCOSE SERPL-MCNC: 147 MG/DL (ref 65–100)
HCT VFR BLD AUTO: 41.6 % (ref 35.8–46.3)
HGB BLD-MCNC: 13.4 G/DL (ref 11.7–15.4)
IMM GRANULOCYTES # BLD AUTO: 0.1 K/UL (ref 0–0.5)
IMM GRANULOCYTES NFR BLD AUTO: 1 % (ref 0–5)
LYMPHOCYTES # BLD: 1.2 K/UL (ref 0.5–4.6)
LYMPHOCYTES NFR BLD: 13 % (ref 13–44)
MAGNESIUM SERPL-MCNC: 2.3 MG/DL (ref 1.8–2.4)
MCH RBC QN AUTO: 28.3 PG (ref 26.1–32.9)
MCHC RBC AUTO-ENTMCNC: 32.2 G/DL (ref 31.4–35)
MCV RBC AUTO: 87.8 FL (ref 82–102)
MONOCYTES # BLD: 0.2 K/UL (ref 0.1–1.3)
MONOCYTES NFR BLD: 2 % (ref 4–12)
NEUTS SEG # BLD: 7.8 K/UL (ref 1.7–8.2)
NEUTS SEG NFR BLD: 84 % (ref 43–78)
NRBC # BLD: 0.02 K/UL (ref 0–0.2)
PLATELET # BLD AUTO: 380 K/UL (ref 150–450)
PMV BLD AUTO: 8.9 FL (ref 9.4–12.3)
POTASSIUM SERPL-SCNC: 4.3 MMOL/L (ref 3.5–5.1)
PROT SERPL-MCNC: 6.9 G/DL (ref 6.3–8.2)
RBC # BLD AUTO: 4.74 M/UL (ref 4.05–5.2)
SODIUM SERPL-SCNC: 139 MMOL/L (ref 133–143)
WBC # BLD AUTO: 9.2 K/UL (ref 4.3–11.1)

## 2023-02-20 PROCEDURE — 80053 COMPREHEN METABOLIC PANEL: CPT

## 2023-02-20 PROCEDURE — 99214 OFFICE O/P EST MOD 30 MIN: CPT | Performed by: NURSE PRACTITIONER

## 2023-02-20 PROCEDURE — 3078F DIAST BP <80 MM HG: CPT | Performed by: NURSE PRACTITIONER

## 2023-02-20 PROCEDURE — 1090F PRES/ABSN URINE INCON ASSESS: CPT | Performed by: NURSE PRACTITIONER

## 2023-02-20 PROCEDURE — G8427 DOCREV CUR MEDS BY ELIG CLIN: HCPCS | Performed by: NURSE PRACTITIONER

## 2023-02-20 PROCEDURE — 3017F COLORECTAL CA SCREEN DOC REV: CPT | Performed by: NURSE PRACTITIONER

## 2023-02-20 PROCEDURE — 1111F DSCHRG MED/CURRENT MED MERGE: CPT | Performed by: NURSE PRACTITIONER

## 2023-02-20 PROCEDURE — 1036F TOBACCO NON-USER: CPT | Performed by: NURSE PRACTITIONER

## 2023-02-20 PROCEDURE — 83735 ASSAY OF MAGNESIUM: CPT

## 2023-02-20 PROCEDURE — G8417 CALC BMI ABV UP PARAM F/U: HCPCS | Performed by: NURSE PRACTITIONER

## 2023-02-20 PROCEDURE — 36415 COLL VENOUS BLD VENIPUNCTURE: CPT

## 2023-02-20 PROCEDURE — G8484 FLU IMMUNIZE NO ADMIN: HCPCS | Performed by: NURSE PRACTITIONER

## 2023-02-20 PROCEDURE — 85025 COMPLETE CBC W/AUTO DIFF WBC: CPT

## 2023-02-20 PROCEDURE — G8399 PT W/DXA RESULTS DOCUMENT: HCPCS | Performed by: NURSE PRACTITIONER

## 2023-02-20 PROCEDURE — 3074F SYST BP LT 130 MM HG: CPT | Performed by: NURSE PRACTITIONER

## 2023-02-20 PROCEDURE — 1123F ACP DISCUSS/DSCN MKR DOCD: CPT | Performed by: NURSE PRACTITIONER

## 2023-02-20 RX ORDER — ONDANSETRON 2 MG/ML
8 INJECTION INTRAMUSCULAR; INTRAVENOUS ONCE
Status: CANCELLED | OUTPATIENT
Start: 2023-02-21 | End: 2023-02-21

## 2023-02-20 RX ORDER — ACETAMINOPHEN 325 MG/1
650 TABLET ORAL
Status: CANCELLED | OUTPATIENT
Start: 2023-02-21

## 2023-02-20 RX ORDER — SODIUM CHLORIDE 9 MG/ML
5-40 INJECTION INTRAVENOUS PRN
Status: CANCELLED | OUTPATIENT
Start: 2023-02-21

## 2023-02-20 RX ORDER — ALBUTEROL SULFATE 90 UG/1
4 AEROSOL, METERED RESPIRATORY (INHALATION) PRN
Status: CANCELLED | OUTPATIENT
Start: 2023-02-21

## 2023-02-20 RX ORDER — SODIUM CHLORIDE 9 MG/ML
INJECTION, SOLUTION INTRAVENOUS CONTINUOUS
Status: CANCELLED | OUTPATIENT
Start: 2023-02-21

## 2023-02-20 RX ORDER — SODIUM CHLORIDE 9 MG/ML
5-250 INJECTION, SOLUTION INTRAVENOUS PRN
Status: CANCELLED | OUTPATIENT
Start: 2023-02-21

## 2023-02-20 RX ORDER — DIPHENHYDRAMINE HYDROCHLORIDE 50 MG/ML
50 INJECTION INTRAMUSCULAR; INTRAVENOUS
Status: CANCELLED | OUTPATIENT
Start: 2023-02-21

## 2023-02-20 RX ORDER — MEPERIDINE HYDROCHLORIDE 50 MG/ML
12.5 INJECTION INTRAMUSCULAR; INTRAVENOUS; SUBCUTANEOUS PRN
Status: CANCELLED | OUTPATIENT
Start: 2023-02-21

## 2023-02-20 RX ORDER — ONDANSETRON 2 MG/ML
8 INJECTION INTRAMUSCULAR; INTRAVENOUS
Status: CANCELLED | OUTPATIENT
Start: 2023-02-21

## 2023-02-20 RX ORDER — FAMOTIDINE 10 MG/ML
20 INJECTION, SOLUTION INTRAVENOUS
Status: CANCELLED | OUTPATIENT
Start: 2023-02-21

## 2023-02-20 RX ORDER — EPINEPHRINE 1 MG/ML
0.3 INJECTION, SOLUTION, CONCENTRATE INTRAVENOUS PRN
Status: CANCELLED | OUTPATIENT
Start: 2023-02-21

## 2023-02-20 RX ORDER — HEPARIN SODIUM (PORCINE) LOCK FLUSH IV SOLN 100 UNIT/ML 100 UNIT/ML
500 SOLUTION INTRAVENOUS PRN
Status: CANCELLED | OUTPATIENT
Start: 2023-02-21

## 2023-02-20 ASSESSMENT — PATIENT HEALTH QUESTIONNAIRE - PHQ9
2. FEELING DOWN, DEPRESSED OR HOPELESS: 0
SUM OF ALL RESPONSES TO PHQ9 QUESTIONS 1 & 2: 0
SUM OF ALL RESPONSES TO PHQ QUESTIONS 1-9: 0
1. LITTLE INTEREST OR PLEASURE IN DOING THINGS: 0
SUM OF ALL RESPONSES TO PHQ QUESTIONS 1-9: 0

## 2023-02-20 NOTE — PROGRESS NOTES
[address] [place] [address] Hematology and Oncology: Office Visit Established Patient    Chief Complaint:    Chief Complaint   Patient presents with    Follow-up         History of Present Illness:  Ms. Arben Berumen is a 75 y.o. female who presents today for follow-up regarding breast cancer. She initially presented for a routine bilateral screening mammogram on 9/9/22 which identified a right posterocentral outer mid breast mass and associated microcalcifications. Further evaluation with right breast diagnostic mammogram on 9/27/2022 showed mass-like density and associated calcifications persisted in the outer right breast demonstrating suspicious features with spiculated margins and suggested architectural distortion. Right breast ultrasound also performed on 9/27/22 confirmed a hypoechoic shadowing mass at the 9 o'clock position of the right breast measuring 2.7 cm x 3 cm x 1.9 cm demonstrating multiple highly suspicious features. Recommended ultrasound-guided biopsy of the right breast lesion was performed on 10/5/22 with pathology revealing ER 98%/NC 92% positive, HER-2 (0) negative, high grade infiltrating ductal carcinoma. MRI of the bilateral breasts as completed on 10/19/22 demonstrating right 9:00 breast cancer measuring up to 6.4 cm by MRI, with no suspicious left breast finding and no obvious lymphadenopathy. She was referred to  for Medical Oncology evaluation and treatment. Normally we would recommend upfront surgery for a tumor with strong HR expression and negative HER2, but the size of the tumor on MRI suggests that she cannot have breast conserving surgery unless she has some cytoreduction prior. If she does strongly desire BCT and it is not feasible with her current tumor dimensions, I would recommend AC-T for neoadjuvant therapy.   On the other hand, if she is willing to consider mastectomy, I would favor upfront surgery as the questionable tumor dimensions, the clinically negative nodes, and the strong HR expression may indicate that gene recurrence testing would be of benefit and chemotherapy unnecessary. Ultimately, she opted for upfront mastectomy, which shows the tumor to be 4.2 cm with 3 negative sentinel nodes, pT2pN0. Because the tumor was under 4.5 cm we recommended Oncotype Dx, which returned at 25. Although this was technically in the range included in [address], her clinicopathologic features including the size and grade are concerning. Indeed, when entered into [facility]in, her risk of recurrence is 35% with a reduction from chemotherapy of 14%. This is well above the threshold for appropriateness of chemotherapy, I would recommend TC for 4 cycles. This would be followed by adjuvant endocrine therapy, she does not require radiation. She returns today for follow up prior to cycle 2 TC. Unfortunately, she was admitted following cycle 1 with neutropenic fever without an infection source. Since home there have been no fevers or infectious symptoms and her thrush has resolved with nystatin. There are no GI or bowel complaints. Appetite is good and weight is stable. No cough, shortness of breath, or edema. No pain. Review of Systems:  Constitutional: Negative. HENT: Negative. Eyes: Negative. Respiratory: Negative. Cardiovascular: Negative. Gastrointestinal: Negative. Genitourinary: Negative. Musculoskeletal: Negative. Skin: Negative. Neurological: Negative. Endo/Heme/Allergies: Negative. Psychiatric/Behavioral: Negative. All other systems reviewed and are negative.      No Known Allergies  Past Medical History:   Diagnosis Date    Cat scratch fever 1973    Elevated liver function tests     Hyperlipidemia     managed with medication    Hypertension     managed with medication    Malignant neoplasm of overlapping sites of right breast in female, estrogen receptor positive ([facility] [address] 2022    Neutropenic fever ([facility] [address] 2/6/2023    Renal insufficiency      Past Surgical History: Procedure Laterality Date    BREAST BIOPSY Right 12/20/2022    RIGHT SENTINEL NODE BIOPSY  performed by Stanislaw Souza MD at [facility] Right 12/20/2022    RIGHT BREAST MASTECTOMY performed by Stanislaw Souza MD at [address] [place] [address]      right lymph node excision - neck    US BREAST BIOPSY W LOC DEVICE 1ST LESION RIGHT Right 10/05/2022    US BREAST NEEDLE BIOPSY RIGHT 10/5/2022 Ira Saha MD [facility]     Family History   Problem Relation Age of Onset    Thyroid Disease Mother     Heart Disease Mother     High Blood Pressure Father     Heart Disease Father     Thyroid Disease Father     Cancer Sister 48        lung    Diabetes Neg Hx      Social History     Socioeconomic History    Marital status:      Spouse name: Not on file    Number of children: Not on file    Years of education: Not on file    Highest education level: Not on file   Occupational History    Not on file   Tobacco Use    Smoking status: Never    Smokeless tobacco: Never   Vaping Use    Vaping Use: Never used   Substance and Sexual Activity    Alcohol use: Not Currently     Comment: Socially    Drug use: Never    Sexual activity: Not on file   Other Topics Concern    Not on file   Social History Narrative    Not on file     Social Determinants of Health     Financial Resource Strain: Low Risk     Difficulty of Paying Living Expenses: Not hard at all   Food Insecurity: No Food Insecurity    Worried About Running Out of Food in the Last Year: Never true    Ran Out of Food in the Last Year: Never true   Transportation Needs: No Transportation Needs    Lack of Transportation (Medical): No    Lack of Transportation (Non-Medical):  No   Physical Activity: Inactive    Days of Exercise per Week: 0 days    Minutes of Exercise per Session: 0 min   Stress: No Stress Concern Present    Feeling of Stress : Not at all   Social Connections: Unknown    Frequency of Communication with Friends and Family: More than three times a week    Frequency of Social Gatherings with Friends and Family: More than three times a week    Attends [employer] Services: Not on file    Active Member of Clubs or Organizations: Not on file    Attends Club or Organization Meetings: Not on file    Marital Status:    Intimate Partner Violence: Not At Risk    Fear of Current or Ex-Partner: No    Emotionally Abused: No    Physically Abused: No    Sexually Abused: No   Housing Stability: Unknown    Unable to Pay for Housing in the Last Year: No    Number of [place] in the Last Year: Not on file    Unstable Housing in the Last Year: No     Current Outpatient Medications   Medication Sig Dispense Refill    lisinopril (PRINIVIL;ZESTRIL) 10 MG tablet Take 1 tablet by mouth daily 90 tablet 1    prochlorperazine (COMPAZINE) 10 MG tablet Take 1 tablet by mouth every 6 hours as needed (nausea) 120 tablet 0    ondansetron (ZOFRAN-ODT) 4 MG disintegrating tablet Take 1 tablet by mouth 3 times daily as needed for Nausea or Vomiting 21 tablet 0    acetaminophen (TYLENOL) 325 MG tablet Take 2 tablets by mouth every 4 hours as needed for Pain (over the counter medication. May take for pain or alternate with Ibuprofen) 1 tablet 0    Multiple Vitamins-Minerals (MULTIVITAMIN ADULTS 50+ PO) Take by mouth at bedtime      Omega-3 Fatty Acids (FISH OIL) 1200 MG CAPS Take by mouth at bedtime      rosuvastatin (CRESTOR) 20 MG tablet Take 1 tablet by mouth nightly 90 tablet 3    ibuprofen (ADVIL;MOTRIN) 600 MG tablet Take 1 tablet by mouth 4 times daily as needed for Pain (May alternate with Tylenol for pain. OVER THE COUNTER MEDICATION) 1 tablet 0     No current facility-administered medications for this visit.        OBJECTIVE:  /69 Comment: standing  Pulse (!) 116   Temp 98.3 °F (36.8 °C) (Oral)   Resp 16   Ht 5' 2\" (1.575 m)   Wt 174 lb 1.6 oz (79 kg)   SpO2 97%   BMI 31.84 kg/m²   Pain Score:   0 - No pain (fatigue-0) ECO    Physical Exam:  Constitutional: Well developed, well nourished female in no acute distress, sitting comfortably in the exam room chair. HEENT: Normocephalic and atraumatic. Oropharynx is clear, mucous membranes are moist.  Sclerae anicteric. Neck supple without JVD. No thyromegaly present. Lymph node   Deferred   Skin Warm and dry. No bruising and no rash noted. No erythema. No pallor. Respiratory Lungs are clear to auscultation bilaterally without wheezes, rales or rhonchi, normal air exchange without accessory muscle use. CVS Normal rate, regular rhythm and normal S1 and S2. No murmurs, gallops, or rubs. Abdomen Soft, nontender and nondistended, normoactive bowel sounds. No palpable mass. No hepatosplenomegaly. Neuro Grossly nonfocal with no obvious sensory or motor deficits. MSK Normal range of motion in general.  No edema and no tenderness. Psych Appropriate mood and affect.         Labs:  Recent Results (from the past 24 hour(s))   CBC with Auto Differential    Collection Time: 23  1:25 PM   Result Value Ref Range    WBC 9.2 4.3 - 11.1 K/uL    RBC 4.74 4.05 - 5.2 M/uL    Hemoglobin 13.4 11.7 - 15.4 g/dL    Hematocrit 41.6 35.8 - 46.3 %    MCV 87.8 82.0 - 102.0 FL    MCH 28.3 26.1 - 32.9 PG    MCHC 32.2 31.4 - 35.0 g/dL    RDW 14.2 11.9 - 14.6 %    Platelets 109 717 - 430 K/uL    MPV 8.9 (L) 9.4 - 12.3 FL    nRBC 0.02 0.0 - 0.2 K/uL    Seg Neutrophils 84 (H) 43 - 78 %    Lymphocytes 13 13 - 44 %    Monocytes 2 (L) 4.0 - 12.0 %    Eosinophils % 0 (L) 0.5 - 7.8 %    Basophils 0 0.0 - 2.0 %    Immature Granulocytes 1 0.0 - 5.0 %    Segs Absolute 7.8 1.7 - 8.2 K/UL    Absolute Lymph # 1.2 0.5 - 4.6 K/UL    Absolute Mono # 0.2 0.1 - 1.3 K/UL    Absolute Eos # 0.0 0.0 - 0.8 K/UL    Basophils Absolute 0.0 0.0 - 0.2 K/UL    Absolute Immature Granulocyte 0.1 0.0 - 0.5 K/UL    Differential Type AUTOMATED     Comprehensive Metabolic Panel    Collection Time: 23  1:25 PM Result Value Ref Range    Sodium 139 133 - 143 mmol/L    Potassium 4.3 3.5 - 5.1 mmol/L    Chloride 107 101 - 110 mmol/L    CO2 26 21 - 32 mmol/L    Anion Gap 6 2 - 11 mmol/L    Glucose 147 (H) 65 - 100 mg/dL    BUN 12 8 - 23 MG/DL    Creatinine 0.90 0.6 - 1.0 MG/DL    Est, Glom Filt Rate >60 >60 ml/min/1.73m2    Calcium 8.9 8.3 - 10.4 MG/DL    Total Bilirubin 0.5 0.2 - 1.1 MG/DL    ALT 38 12 - 65 U/L    AST 20 15 - 37 U/L    Alk Phosphatase 84 50 - 136 U/L    Total Protein 6.9 6.3 - 8.2 g/dL    Albumin 3.8 3.2 - 4.6 g/dL    Globulin 3.1 2.8 - 4.5 g/dL    Albumin/Globulin Ratio 1.2 0.4 - 1.6     Magnesium    Collection Time: 02/20/23  1:25 PM   Result Value Ref Range    Magnesium 2.3 1.8 - 2.4 mg/dL       Imaging:  MRI BREAST BILATERAL W WO CONTRAST    Result Date: 10/20/2022  MRI of the Breasts with and without contrast CLINICAL INDICATION:  New diagnosis of right breast 9:00 infiltrating ductal carcinoma high grade undergoing evaluation for extent of disease, staging, therapy planning. No previous personal malignancy or breast surgery in the past otherwise. COMPARISON: Ultrasound and mammography 10/5/2022, 9/27/2022, 9/9/2022 TECHNIQUE: Standard MRI sequences were obtained through the breasts in multiple planes. Images were obtained before and after intravenous infusion of 17 mL of Prohance contrast. Images were reviewed with PACS and with [employer] CAD software. FINDINGS: The breasts demonstrate moderate glandularity and mild background enhancement. Right breast: An 9:00 midposterior depth are biopsy changes within the irregular heterogeneously enhancing malignant mass measuring up to 4.0 x 2.6 x 3.3 cm. Anterior to the mass (within 1.5 cm of the anterior margin) system are 2 tiny 3 mm satellite nodules. Overall the MRI area of concern measures up to 6.4 cm AP. Note that on recent mammography, malignant-type calcifications extending anteriorly and posteriorly from the mass for total AP extent of at least 8 cm. Left breast: No evidence of suspicious enhancing mass, and no dominant or unique nonmass enhancement, to suggest malignancy. Lymph nodes: No obvious axillary or internal mammary lymphadenopathy. Several bilateral axillary lymph nodes are relatively symmetric in size and number, all demonstrating preservation of expected reniform shape with fatty grant. Elsewhere colon limited inclusion of the thorax and upper abdomen shows a partially visualized hiatal hernia which is not entirely seen or evaluated here. 1. Right 9:00 breast cancer measures up to 6.4 cm by MRI (recent mammography demonstrated at least 8 cm of expected disease). 2. No suspicious left breast finding. 3. No obvious lymphadenopathy. Recommend continued malignancy management as directed clinically. BI-RADS Assessment Category 6: Known Biopsy Proven Malignancy     ZIA DIGITAL DIAGNOSTIC W OR WO CAD RIGHT    Result Date: 9/27/2022  RIGHT BREAST DIAGNOSTIC MAMMOGRAM and RIGHT BREAST ULTRASOUND, 9/27/2022. CLINICAL HISTORY: Abnormal screening mammogram. COMPARISON STUDY: Screening mammogram 9/9/2020. FINDINGS: RIGHT BREAST DIAGNOSTIC MAMMOGRAM: Straight mediolateral view of the right breast as well as compression images of the right breast in the CC, and MLO plane are submitted for evaluation. Masslike density and associated calcifications persist in the outer right breast. These demonstrate suspicious features with spiculated margins and suggest architectural distortion. Additional associated calcifications also suspicious. Further characterization with focused diagnostic ultrasound is recommended. RIGHT BREAST ULTRASOUND, 9/27/2022. CLINICAL HISTORY: Abnormal screening and diagnostic mammogram. Technique: Grayscale, and Doppler imaging of the right breast soft tissues was performed using a 15 MHz transducer.  FINDINGS: Focused ultrasound imaging at the 9 o'clock position of the right breast in the area of masslike density as seen on prior mammogram imaging shows a hypoechoic shadowing mass measuring 2.7 cm x 3 cm x 1.9 cm demonstrating multiple highly suspicious features such as irregular margins, shadowing, and a vertical configuration for which a benign process cannot be confirmed. 1. Highly suspicious right breast mass. Further evaluation with ultrasound-guided biopsy is recommended. BI-RADS Assessment Category 5: Highly suggestive of malignancy- Appropriate action should be taken. US BREAST LIMITED RIGHT    Result Date: 9/27/2022  RIGHT BREAST DIAGNOSTIC MAMMOGRAM and RIGHT BREAST ULTRASOUND, 9/27/2022. CLINICAL HISTORY: Abnormal screening mammogram. COMPARISON STUDY: Screening mammogram 9/9/2020. FINDINGS: RIGHT BREAST DIAGNOSTIC MAMMOGRAM: Straight mediolateral view of the right breast as well as compression images of the right breast in the CC, and MLO plane are submitted for evaluation. Masslike density and associated calcifications persist in the outer right breast. These demonstrate suspicious features with spiculated margins and suggest architectural distortion. Additional associated calcifications also suspicious. Further characterization with focused diagnostic ultrasound is recommended. RIGHT BREAST ULTRASOUND, 9/27/2022. CLINICAL HISTORY: Abnormal screening and diagnostic mammogram. Technique: Grayscale, and Doppler imaging of the right breast soft tissues was performed using a 15 MHz transducer. FINDINGS: Focused ultrasound imaging at the 9 o'clock position of the right breast in the area of masslike density as seen on prior mammogram imaging shows a hypoechoic shadowing mass measuring 2.7 cm x 3 cm x 1.9 cm demonstrating multiple highly suspicious features such as irregular margins, shadowing, and a vertical configuration for which a benign process cannot be confirmed. 1. Highly suspicious right breast mass. Further evaluation with ultrasound-guided biopsy is recommended.  BI-RADS Assessment Category 5: Highly suggestive of malignancy- Appropriate action should be taken. MAMMOGRAM POST BX CLIP PLACEMENT RIGHT    Result Date: 10/5/2022  POSTBIOPSY MAMMOGRAM, 10/5/2022. CLINICAL HISTORY: Status post percutaneous biopsy. FINDINGS: CC, ML views of the right breast demonstrate the biopsy clip in the outer soft tissues of the right breast. No significant post biopsy hematoma is seen. There has been successful placement of the biopsy clip occurring within the more lateral aspect of the indeterminate mass. 1. Successful biopsy and placement of marker clip. This is a post biopsy mammogram and does not require BI-RADS categorization. US BREAST BIOPSY W LOC DEVICE 1ST LESION RIGHT    Addendum Date: 10/12/2022    Addendum: Deysi Puri, accession number: [id number] Date: 10/5/2022 Pathology was noted as A: Right breast, 9:00 position, 12 cm from nipple, core biopsy: Infiltrating ductal carcinoma, high grade (poorly differentiated). Definite in situ component and lymphovascular invasion are not identified. Results concordant with imaging. Pathology report was faxed to  [address] [place] [address]'s office on 10/6/2022 by RT Annalise(HARMONY)(EVELIN). Patient was referred to Oncology services on 10/6/2022. Result Date: 10/12/2022  Ultrasound-guided right breast biopsy, 10/5/2022. CLINICAL HISTORY: Right breast lesion. PROCEDURE: After obtaining written informed consent, the patient was placed in a supine position on the ultrasound table. Preliminary imaging demonstrates the 2.5 cm x 2.5 cm x 2.4 cm hypoechoic lesion at the 9 o'clock position of the right breast. The right breast was prepped and draped in the usual sterile fashion. Lidocaine was used for local anesthesia. Using ultrasound guidance, a 14-gauge Assure needle was positioned just proximal to the lesion with the sampling trough subsequently placed through the lesion. A total of 5 samples were taken with ultrasound documentation of each pass. A biopsy clip was placed at the site of biopsy. The biopsy apparatus was removed and hemostasis was achieved. No procedure or immediate postprocedure complications were noted. The patient left the department in good condition. 1. Successful biopsy of right breast lesion with histologic results pending. Pathology:  DIAGNOSIS        A:  \"RIGHT BREAST\":  POORLY DIFFERENTIATED (3 + 2 + 3), 4.2 CM IN   GREATEST DIMENSION, MARGINS UNINVOLVED; FIBROCYSTIC MASTOPATHY;   INTRADUCTAL PAPILLOMA. B:  \"RIGHT AXILLARY SENTINEL NODES\":  THREE BENIGN LYMPH NODES, ALL   NEGATIVE FOR TUMOR.           tls/12/22/2022     Sign Out Date: 12/22/2022  Marycarmen Wadsworth MD       Procedures/Addenda                     STF - ONCOTYPE DX ASSAY                                                                                                                                         Status:  Signed Out    Yaya Wadsworth MD on 1/6/2023                                 Interpretation                       Oncotype DX Breast Recurrence Score:  25       DIAGNOSIS        A:  \" RIGHT BREAST, 9:00 POSITION, 12 CM FROM NIPPLE, CORE BIOPSY\":         INFILTRATING DUCTAL CARCINOMA, HIGH GRADE (POORLY DIFFERENTIATED).    DEFINITE IN SITU COMPONENT AND LYMPHOVASCULAR INVASION ARE NOT IDENTIFIED             Procedures/Addenda                     STF- ER/IN/LOL6PDT BY IHC                                                                                                                                         Status:  Signed Out    Dewayne Burgess MD on 10/10/2022                                 Interpretation                       [facility] IHC Quantitative Breast Panel Result: A1     TEST NAME:                         RESULTS:               INTERNAL   CONTROLS:     ESTROGEN RECEPTOR:               Positive (98%)               Present,   positive   PROGESTERONE RECEPTOR:          Positive (92%)               Present,   positive   HER-2/SHARDA:                         Negative (0)               Percentage   of Cells with Uniform        Intense Complete Membrane   Stainin%                   ASSESSMENT:   Diagnosis Orders   1. Malignant neoplasm of overlapping sites of right breast in female, estrogen receptor positive [facility])              Patient Active Problem List   Diagnosis    Elevated liver function tests    Dyslipidemia    Weight gain    Renal insufficiency    Malignant neoplasm of overlapping sites of right breast in female, estrogen receptor positive ([facility] [address]    Primary hypertension    Ductal carcinoma in situ of right breast    Breast cancer in female [facility])    Neutropenic fever ([facility] [address]    Thrush           PLAN:  Lab studies and imaging studies were personally reviewed. Pertinent old records were reviewed. Breast cancer: 3cm by ultrasound but up to 6.4 cm on MRI (including two tiny satellite nodules), high grade, ER/OH strongly positive, HER2 0. Upfront mastectomy shows the tumor to be 4.2 cm with 3 negative sentinel nodes, pT2pN0. Because the tumor was under 4.5 cm we recommended Oncotype Dx, which returned at 25. Although this was technically in the range included in [address], her clinicopathologic features including the size and grade are concerning. Indeed, when entered into [facility]Clin, her risk of recurrence is 35% with a reduction from chemotherapy of 14%. This is well above the threshold for appropriateness of chemotherapy, I would recommend TC for 4 cycles. This would be followed by adjuvant endocrine therapy, she does not require radiation. She returns today for follow up prior to cycle 2 TC. Unfortunately, she was admitted following cycle 1 with neutropenic fever without an infection source. Since home there have been no fevers or infectious symptoms and her thrush has resolved with nystatin. There are no GI or bowel complaints. Appetite is good and weight is stable. No cough, shortness of breath, or edema. No pain.   Labs reviewed and adequate to proceed with C2 TC tomorrow as planned. All questions were asked and answered to the best of my ability. F/u for cycle 3 TC in 3 weeks.              DERIK Lal [facility] Hematology and Oncology  [address] [place] [address]  Office : [phone]  Fax : [phone]

## 2023-02-21 ENCOUNTER — HOSPITAL ENCOUNTER (OUTPATIENT)
Dept: INFUSION THERAPY | Age: 68
Discharge: HOME | End: 2023-02-21
Payer: MEDICARE

## 2023-02-21 VITALS
WEIGHT: 173.6 LBS | TEMPERATURE: 97.4 F | DIASTOLIC BLOOD PRESSURE: 75 MMHG | OXYGEN SATURATION: 97 % | HEART RATE: 98 BPM | BODY MASS INDEX: 31.75 KG/M2 | RESPIRATION RATE: 16 BRPM | SYSTOLIC BLOOD PRESSURE: 145 MMHG

## 2023-02-21 DIAGNOSIS — C50.811 MALIGNANT NEOPLASM OF OVERLAPPING SITES OF RIGHT BREAST IN FEMALE, ESTROGEN RECEPTOR POSITIVE (HCC): Primary | ICD-10-CM

## 2023-02-21 DIAGNOSIS — Z17.0 MALIGNANT NEOPLASM OF OVERLAPPING SITES OF RIGHT BREAST IN FEMALE, ESTROGEN RECEPTOR POSITIVE (HCC): Primary | ICD-10-CM

## 2023-02-21 PROCEDURE — 96367 TX/PROPH/DG ADDL SEQ IV INF: CPT

## 2023-02-21 PROCEDURE — 6360000002 HC RX W HCPCS: Performed by: NURSE PRACTITIONER

## 2023-02-21 PROCEDURE — 2580000003 HC RX 258: Performed by: NURSE PRACTITIONER

## 2023-02-21 PROCEDURE — 96375 TX/PRO/DX INJ NEW DRUG ADDON: CPT

## 2023-02-21 PROCEDURE — 96417 CHEMO IV INFUS EACH ADDL SEQ: CPT

## 2023-02-21 PROCEDURE — 96413 CHEMO IV INFUSION 1 HR: CPT

## 2023-02-21 RX ORDER — SODIUM CHLORIDE 9 MG/ML
5-250 INJECTION, SOLUTION INTRAVENOUS PRN
Status: DISCONTINUED | OUTPATIENT
Start: 2023-02-21 | End: 2023-02-22 | Stop reason: HOSPADM

## 2023-02-21 RX ORDER — ONDANSETRON HYDROCHLORIDE 8 MG/1
8 TABLET, FILM COATED ORAL EVERY 8 HOURS PRN
Qty: 60 TABLET | Refills: 5 | Status: SHIPPED | OUTPATIENT
Start: 2023-02-21

## 2023-02-21 RX ORDER — DIPHENHYDRAMINE HYDROCHLORIDE 50 MG/ML
50 INJECTION INTRAMUSCULAR; INTRAVENOUS
Status: DISCONTINUED | OUTPATIENT
Start: 2023-02-21 | End: 2023-02-22 | Stop reason: HOSPADM

## 2023-02-21 RX ORDER — ONDANSETRON 2 MG/ML
8 INJECTION INTRAMUSCULAR; INTRAVENOUS ONCE
Status: COMPLETED | OUTPATIENT
Start: 2023-02-21 | End: 2023-02-21

## 2023-02-21 RX ORDER — SODIUM CHLORIDE 9 MG/ML
5-40 INJECTION INTRAVENOUS PRN
Status: DISCONTINUED | OUTPATIENT
Start: 2023-02-21 | End: 2023-02-22 | Stop reason: HOSPADM

## 2023-02-21 RX ADMIN — DOCETAXEL ANHYDROUS 140 MG: 10 INJECTION, SOLUTION INTRAVENOUS at 09:43

## 2023-02-21 RX ADMIN — DEXAMETHASONE SODIUM PHOSPHATE 12 MG: 4 INJECTION, SOLUTION INTRAMUSCULAR; INTRAVENOUS at 09:15

## 2023-02-21 RX ADMIN — ONDANSETRON 8 MG: 2 INJECTION INTRAMUSCULAR; INTRAVENOUS at 09:13

## 2023-02-21 RX ADMIN — CYCLOPHOSPHAMIDE 1120 MG: 1 INJECTION, POWDER, FOR SOLUTION INTRAVENOUS; ORAL at 10:52

## 2023-02-21 RX ADMIN — SODIUM CHLORIDE, PRESERVATIVE FREE 10 ML: 5 INJECTION INTRAVENOUS at 09:06

## 2023-02-21 RX ADMIN — SODIUM CHLORIDE 50 ML/HR: 9 INJECTION, SOLUTION INTRAVENOUS at 09:00

## 2023-02-21 NOTE — PROGRESS NOTES
Arrived to the [facility]. Taxotere and cytoxan completed. Patient tolerated well. Any issues or concerns during appointment: none. Patient aware of next infusion appointment on 2/22/2023 (date) at 1500 (time). Patient aware of next lab and [facility] office visit on 3/13/2023 (date) at 1200 (time). Patient instructed to call provider with temperature of 100.4 or greater or nausea/vomiting/ diarrhea or pain not controlled by medications  Discharged ambulatory.

## 2023-02-22 ENCOUNTER — HOSPITAL ENCOUNTER (OUTPATIENT)
Dept: INFUSION THERAPY | Age: 68
Discharge: HOME | End: 2023-02-22
Payer: MEDICARE

## 2023-02-22 VITALS
DIASTOLIC BLOOD PRESSURE: 68 MMHG | SYSTOLIC BLOOD PRESSURE: 125 MMHG | TEMPERATURE: 97.8 F | OXYGEN SATURATION: 97 % | HEART RATE: 82 BPM | RESPIRATION RATE: 18 BRPM

## 2023-02-22 DIAGNOSIS — C50.811 MALIGNANT NEOPLASM OF OVERLAPPING SITES OF RIGHT BREAST IN FEMALE, ESTROGEN RECEPTOR POSITIVE (HCC): Primary | ICD-10-CM

## 2023-02-22 DIAGNOSIS — Z17.0 MALIGNANT NEOPLASM OF OVERLAPPING SITES OF RIGHT BREAST IN FEMALE, ESTROGEN RECEPTOR POSITIVE (HCC): Primary | ICD-10-CM

## 2023-02-22 PROCEDURE — 6360000002 HC RX W HCPCS: Performed by: NURSE PRACTITIONER

## 2023-02-22 PROCEDURE — 96372 THER/PROPH/DIAG INJ SC/IM: CPT

## 2023-02-22 RX ADMIN — PEGFILGRASTIM 6 MG: 6 INJECTION SUBCUTANEOUS at 15:10

## 2023-02-22 NOTE — PROGRESS NOTES
Arrived to the [facility]. Juan completed. Provided education on same. Patient instructed to report any side affects to ordering provider. Patient tolerated well. Any issues or concerns during appointment: none. Patient aware of next infusion appointment on 3/13  Discharged ambulatory.

## 2023-03-09 DIAGNOSIS — Z17.0 MALIGNANT NEOPLASM OF OVERLAPPING SITES OF RIGHT BREAST IN FEMALE, ESTROGEN RECEPTOR POSITIVE (HCC): Primary | ICD-10-CM

## 2023-03-09 DIAGNOSIS — C50.811 MALIGNANT NEOPLASM OF OVERLAPPING SITES OF RIGHT BREAST IN FEMALE, ESTROGEN RECEPTOR POSITIVE (HCC): Primary | ICD-10-CM

## 2023-03-13 ENCOUNTER — OFFICE VISIT (OUTPATIENT)
Dept: ONCOLOGY | Age: 68
End: 2023-03-13
Payer: MEDICARE

## 2023-03-13 ENCOUNTER — HOSPITAL ENCOUNTER (OUTPATIENT)
Dept: LAB | Age: 68
Discharge: HOME | End: 2023-03-16
Payer: MEDICARE

## 2023-03-13 VITALS
BODY MASS INDEX: 31.96 KG/M2 | HEIGHT: 62 IN | WEIGHT: 173.7 LBS | HEART RATE: 63 BPM | DIASTOLIC BLOOD PRESSURE: 79 MMHG | OXYGEN SATURATION: 96 % | SYSTOLIC BLOOD PRESSURE: 119 MMHG | TEMPERATURE: 98 F | RESPIRATION RATE: 16 BRPM

## 2023-03-13 DIAGNOSIS — Z17.0 MALIGNANT NEOPLASM OF OVERLAPPING SITES OF RIGHT BREAST IN FEMALE, ESTROGEN RECEPTOR POSITIVE (HCC): Primary | ICD-10-CM

## 2023-03-13 DIAGNOSIS — K59.03 DRUG INDUCED CONSTIPATION: ICD-10-CM

## 2023-03-13 DIAGNOSIS — C50.811 MALIGNANT NEOPLASM OF OVERLAPPING SITES OF RIGHT BREAST IN FEMALE, ESTROGEN RECEPTOR POSITIVE (HCC): Primary | ICD-10-CM

## 2023-03-13 DIAGNOSIS — T45.1X5A CHEMOTHERAPY-INDUCED NAUSEA: ICD-10-CM

## 2023-03-13 DIAGNOSIS — C50.811 MALIGNANT NEOPLASM OF OVERLAPPING SITES OF RIGHT BREAST IN FEMALE, ESTROGEN RECEPTOR POSITIVE (HCC): ICD-10-CM

## 2023-03-13 DIAGNOSIS — R11.0 CHEMOTHERAPY-INDUCED NAUSEA: ICD-10-CM

## 2023-03-13 DIAGNOSIS — Z17.0 MALIGNANT NEOPLASM OF OVERLAPPING SITES OF RIGHT BREAST IN FEMALE, ESTROGEN RECEPTOR POSITIVE (HCC): ICD-10-CM

## 2023-03-13 LAB
ALBUMIN SERPL-MCNC: 3.4 G/DL (ref 3.2–4.6)
ALBUMIN/GLOB SERPL: 1.1 (ref 0.4–1.6)
ALP SERPL-CCNC: 74 U/L (ref 50–136)
ALT SERPL-CCNC: 32 U/L (ref 12–65)
ANION GAP SERPL CALC-SCNC: 3 MMOL/L (ref 2–11)
AST SERPL-CCNC: 19 U/L (ref 15–37)
BASOPHILS # BLD: 0 K/UL (ref 0–0.2)
BASOPHILS NFR BLD: 1 % (ref 0–2)
BILIRUB SERPL-MCNC: 0.5 MG/DL (ref 0.2–1.1)
BUN SERPL-MCNC: 11 MG/DL (ref 8–23)
CALCIUM SERPL-MCNC: 8.8 MG/DL (ref 8.3–10.4)
CHLORIDE SERPL-SCNC: 108 MMOL/L (ref 101–110)
CO2 SERPL-SCNC: 27 MMOL/L (ref 21–32)
CREAT SERPL-MCNC: 0.8 MG/DL (ref 0.6–1)
DIFFERENTIAL METHOD BLD: ABNORMAL
EOSINOPHIL # BLD: 0 K/UL (ref 0–0.8)
EOSINOPHIL NFR BLD: 0 % (ref 0.5–7.8)
ERYTHROCYTE [DISTWIDTH] IN BLOOD BY AUTOMATED COUNT: 16.4 % (ref 11.9–14.6)
GLOBULIN SER CALC-MCNC: 3.2 G/DL (ref 2.8–4.5)
GLUCOSE SERPL-MCNC: 113 MG/DL (ref 65–100)
HCT VFR BLD AUTO: 39.5 % (ref 35.8–46.3)
HGB BLD-MCNC: 12.5 G/DL (ref 11.7–15.4)
IMM GRANULOCYTES # BLD AUTO: 0 K/UL (ref 0–0.5)
IMM GRANULOCYTES NFR BLD AUTO: 1 % (ref 0–5)
LYMPHOCYTES # BLD: 1 K/UL (ref 0.5–4.6)
LYMPHOCYTES NFR BLD: 12 % (ref 13–44)
MCH RBC QN AUTO: 28.2 PG (ref 26.1–32.9)
MCHC RBC AUTO-ENTMCNC: 31.6 G/DL (ref 31.4–35)
MCV RBC AUTO: 89 FL (ref 82–102)
MONOCYTES # BLD: 0.4 K/UL (ref 0.1–1.3)
MONOCYTES NFR BLD: 6 % (ref 4–12)
NEUTS SEG # BLD: 6.2 K/UL (ref 1.7–8.2)
NEUTS SEG NFR BLD: 80 % (ref 43–78)
NRBC # BLD: 0 K/UL (ref 0–0.2)
PLATELET # BLD AUTO: 286 K/UL (ref 150–450)
PMV BLD AUTO: 8.6 FL (ref 9.4–12.3)
POTASSIUM SERPL-SCNC: 4.1 MMOL/L (ref 3.5–5.1)
PROT SERPL-MCNC: 6.6 G/DL (ref 6.3–8.2)
RBC # BLD AUTO: 4.44 M/UL (ref 4.05–5.2)
SODIUM SERPL-SCNC: 138 MMOL/L (ref 133–143)
WBC # BLD AUTO: 7.7 K/UL (ref 4.3–11.1)

## 2023-03-13 PROCEDURE — 1090F PRES/ABSN URINE INCON ASSESS: CPT | Performed by: NURSE PRACTITIONER

## 2023-03-13 PROCEDURE — G8484 FLU IMMUNIZE NO ADMIN: HCPCS | Performed by: NURSE PRACTITIONER

## 2023-03-13 PROCEDURE — 99214 OFFICE O/P EST MOD 30 MIN: CPT | Performed by: NURSE PRACTITIONER

## 2023-03-13 PROCEDURE — 85025 COMPLETE CBC W/AUTO DIFF WBC: CPT

## 2023-03-13 PROCEDURE — G8399 PT W/DXA RESULTS DOCUMENT: HCPCS | Performed by: NURSE PRACTITIONER

## 2023-03-13 PROCEDURE — G8417 CALC BMI ABV UP PARAM F/U: HCPCS | Performed by: NURSE PRACTITIONER

## 2023-03-13 PROCEDURE — 1123F ACP DISCUSS/DSCN MKR DOCD: CPT | Performed by: NURSE PRACTITIONER

## 2023-03-13 PROCEDURE — G8427 DOCREV CUR MEDS BY ELIG CLIN: HCPCS | Performed by: NURSE PRACTITIONER

## 2023-03-13 PROCEDURE — 3017F COLORECTAL CA SCREEN DOC REV: CPT | Performed by: NURSE PRACTITIONER

## 2023-03-13 PROCEDURE — 36415 COLL VENOUS BLD VENIPUNCTURE: CPT

## 2023-03-13 PROCEDURE — 80053 COMPREHEN METABOLIC PANEL: CPT

## 2023-03-13 PROCEDURE — 3078F DIAST BP <80 MM HG: CPT | Performed by: NURSE PRACTITIONER

## 2023-03-13 PROCEDURE — 3074F SYST BP LT 130 MM HG: CPT | Performed by: NURSE PRACTITIONER

## 2023-03-13 PROCEDURE — 1036F TOBACCO NON-USER: CPT | Performed by: NURSE PRACTITIONER

## 2023-03-13 RX ORDER — MEPERIDINE HYDROCHLORIDE 50 MG/ML
12.5 INJECTION INTRAMUSCULAR; INTRAVENOUS; SUBCUTANEOUS PRN
Status: CANCELLED | OUTPATIENT
Start: 2023-03-14

## 2023-03-13 RX ORDER — FAMOTIDINE 10 MG/ML
20 INJECTION, SOLUTION INTRAVENOUS
Status: CANCELLED | OUTPATIENT
Start: 2023-03-14

## 2023-03-13 RX ORDER — ONDANSETRON 2 MG/ML
8 INJECTION INTRAMUSCULAR; INTRAVENOUS
Status: CANCELLED | OUTPATIENT
Start: 2023-03-14

## 2023-03-13 RX ORDER — SODIUM CHLORIDE 9 MG/ML
5-40 INJECTION INTRAVENOUS PRN
Status: CANCELLED | OUTPATIENT
Start: 2023-03-14

## 2023-03-13 RX ORDER — ACETAMINOPHEN 325 MG/1
650 TABLET ORAL
Status: CANCELLED | OUTPATIENT
Start: 2023-03-14

## 2023-03-13 RX ORDER — SODIUM CHLORIDE 9 MG/ML
5-250 INJECTION, SOLUTION INTRAVENOUS PRN
Status: CANCELLED | OUTPATIENT
Start: 2023-03-14

## 2023-03-13 RX ORDER — SODIUM CHLORIDE 9 MG/ML
INJECTION, SOLUTION INTRAVENOUS CONTINUOUS
Status: CANCELLED | OUTPATIENT
Start: 2023-03-14

## 2023-03-13 RX ORDER — HEPARIN SODIUM (PORCINE) LOCK FLUSH IV SOLN 100 UNIT/ML 100 UNIT/ML
500 SOLUTION INTRAVENOUS PRN
Status: CANCELLED | OUTPATIENT
Start: 2023-03-14

## 2023-03-13 RX ORDER — ALBUTEROL SULFATE 90 UG/1
4 AEROSOL, METERED RESPIRATORY (INHALATION) PRN
Status: CANCELLED | OUTPATIENT
Start: 2023-03-14

## 2023-03-13 RX ORDER — DIPHENHYDRAMINE HYDROCHLORIDE 50 MG/ML
50 INJECTION INTRAMUSCULAR; INTRAVENOUS
Status: CANCELLED | OUTPATIENT
Start: 2023-03-14

## 2023-03-13 RX ORDER — EPINEPHRINE 1 MG/ML
0.3 INJECTION, SOLUTION, CONCENTRATE INTRAVENOUS PRN
Status: CANCELLED | OUTPATIENT
Start: 2023-03-14

## 2023-03-13 RX ORDER — ONDANSETRON 2 MG/ML
8 INJECTION INTRAMUSCULAR; INTRAVENOUS ONCE
Status: CANCELLED | OUTPATIENT
Start: 2023-03-14 | End: 2023-03-14

## 2023-03-13 ASSESSMENT — PATIENT HEALTH QUESTIONNAIRE - PHQ9
2. FEELING DOWN, DEPRESSED OR HOPELESS: 0
SUM OF ALL RESPONSES TO PHQ QUESTIONS 1-9: 0

## 2023-03-13 NOTE — PROGRESS NOTES
[facility] Hematology and Oncology: Office Visit Established Patient    Chief Complaint:    Chief Complaint   Patient presents with    Follow-up         History of Present Illness:  Ms. Melisa Ortiz is a 75 y.o. female who presents today for follow-up regarding breast cancer. She initially presented for a routine bilateral screening mammogram on 9/9/22 which identified a right posterocentral outer mid breast mass and associated microcalcifications. Further evaluation with right breast diagnostic mammogram on 9/27/2022 showed mass-like density and associated calcifications persisted in the outer right breast demonstrating suspicious features with spiculated margins and suggested architectural distortion. Right breast ultrasound also performed on 9/27/22 confirmed a hypoechoic shadowing mass at the 9 o'clock position of the right breast measuring 2.7 cm x 3 cm x 1.9 cm demonstrating multiple highly suspicious features. Recommended ultrasound-guided biopsy of the right breast lesion was performed on 10/5/22 with pathology revealing ER 98%/TN 92% positive, HER-2 (0) negative, high grade infiltrating ductal carcinoma. MRI of the bilateral breasts as completed on 10/19/22 demonstrating right 9:00 breast cancer measuring up to 6.4 cm by MRI, with no suspicious left breast finding and no obvious lymphadenopathy. She was referred to [facility] for Medical Oncology evaluation and treatment. Normally we would recommend upfront surgery for a tumor with strong HR expression and negative HER2, but the size of the tumor on MRI suggests that she cannot have breast conserving surgery unless she has some cytoreduction prior. If she does strongly desire BCT and it is not feasible with her current tumor dimensions, I would recommend AC-T for neoadjuvant therapy.   On the other hand, if she is willing to consider mastectomy, I would favor upfront surgery as the questionable tumor dimensions, the clinically negative nodes, and the strong HR expression may indicate that gene recurrence testing would be of benefit and chemotherapy unnecessary. Ultimately, she opted for upfront mastectomy, which shows the tumor to be 4.2 cm with 3 negative sentinel nodes, pT2pN0. Because the tumor was under 4.5 cm we recommended Oncotype Dx, which returned at 25. Although this was technically in the range included in [address], her clinicopathologic features including the size and grade are concerning. Indeed, when entered into RSClin, her risk of recurrence is 35% with a reduction from chemotherapy of 14%. This is well above the threshold for appropriateness of chemotherapy, I would recommend TC for 4 cycles. This would be followed by adjuvant endocrine therapy, she does not require radiation. She returns today for follow up prior to cycle 3 TC, overall, C2 was much better than cycle 1. Nausea is well controlled with anti-emetics and constipation is controlled with prunes/juice. There was no thrush this cycle. Taste changes are present, however she continues to eat/drink well, weight is stable. Mild fatigue is present as expected and she has been able to work some after the 1st week of treatment. There is no cough, shortness of breath, or edema. No neuropathy. G-CSF bone pain was minimal.  Following steroids she does have some flushing of her face and chest, no rash. She denies any fevers or infectious symptoms. Review of Systems:  Constitutional: Positive for fatigue-mild. HENT: Negative. Eyes: Negative. Respiratory: Negative. Cardiovascular: Negative. Gastrointestinal: Negative. Genitourinary: Negative. Musculoskeletal: Negative. Skin: Negative. Neurological: Negative. Endo/Heme/Allergies: Negative. Psychiatric/Behavioral: Negative. All other systems reviewed and are negative.      No Known Allergies  Past Medical History:   Diagnosis Date    Cat scratch fever 1973    Elevated liver function tests     Hyperlipidemia managed with medication    Hypertension     managed with medication    Malignant neoplasm of overlapping sites of right breast in female, estrogen receptor positive ([facility] 2022    Neutropenic fever ([facility] 2/6/2023    Renal insufficiency      Past Surgical History:   Procedure Laterality Date    BREAST BIOPSY Right 12/20/2022    RIGHT SENTINEL NODE BIOPSY  performed by Lou Krishnan MD at [facility] Right 12/20/2022    RIGHT BREAST MASTECTOMY performed by Lou Krishnan MD at [address] [place] [address] Rd      right lymph node excision - neck    US BREAST BIOPSY W LOC DEVICE 1ST LESION RIGHT Right 10/05/2022    US BREAST NEEDLE BIOPSY RIGHT 10/5/2022 Leonie Ingram MD [facility]     Family History   Problem Relation Age of Onset    Thyroid Disease Mother     Heart Disease Mother     High Blood Pressure Father     Heart Disease Father     Thyroid Disease Father     Cancer Sister 48        lung    Diabetes Neg Hx      Social History     Socioeconomic History    Marital status:      Spouse name: Not on file    Number of children: Not on file    Years of education: Not on file    Highest education level: Not on file   Occupational History    Not on file   Tobacco Use    Smoking status: Never    Smokeless tobacco: Never   Vaping Use    Vaping Use: Never used   Substance and Sexual Activity    Alcohol use: Not Currently     Comment: Socially    Drug use: Never    Sexual activity: Not on file   Other Topics Concern    Not on file   Social History Narrative    Not on file     Social Determinants of Health     Financial Resource Strain: Low Risk     Difficulty of Paying Living Expenses: Not hard at all   Food Insecurity: No Food Insecurity    Worried About Running Out of Food in the Last Year: Never true    [address] in the Last Year: Never true   Transportation Needs: No Transportation Needs    Lack of Transportation (Medical): No    Lack of Transportation (Non-Medical): No   Physical Activity: Inactive    Days of Exercise per Week: 0 days    Minutes of Exercise per Session: 0 min   Stress: No Stress Concern Present    Feeling of Stress : Not at all   Social Connections: Unknown    Frequency of Communication with Friends and Family: More than three times a week    Frequency of Social Gatherings with Friends and Family: More than three times a week    Attends [employer] Services: Not on file    Active Member of Clubs or Organizations: Not on file    Attends Club or Organization Meetings: Not on file    Marital Status:    Intimate Partner Violence: Not At Risk    Fear of Current or Ex-Partner: No    Emotionally Abused: No    Physically Abused: No    Sexually Abused: No   Housing Stability: Unknown    Unable to Pay for Housing in the Last Year: No    Number of Places Lived in the Last Year: Not on file    Unstable Housing in the Last Year: No     Current Outpatient Medications   Medication Sig Dispense Refill    ondansetron (ZOFRAN) 8 MG tablet Take 1 tablet by mouth every 8 hours as needed for Nausea or Vomiting 60 tablet 5    lisinopril (PRINIVIL;ZESTRIL) 10 MG tablet Take 1 tablet by mouth daily 90 tablet 1    Multiple Vitamins-Minerals (MULTIVITAMIN ADULTS 50+ PO) Take by mouth at bedtime      Omega-3 Fatty Acids (FISH OIL) 1200 MG CAPS Take by mouth at bedtime      rosuvastatin (CRESTOR) 20 MG tablet Take 1 tablet by mouth nightly 90 tablet 3    prochlorperazine (COMPAZINE) 10 MG tablet Take 1 tablet by mouth every 6 hours as needed (nausea) (Patient not taking: Reported on 3/13/2023) 120 tablet 0    ondansetron (ZOFRAN-ODT) 4 MG disintegrating tablet Take 1 tablet by mouth 3 times daily as needed for Nausea or Vomiting (Patient not taking: Reported on 3/13/2023) 21 tablet 0    acetaminophen (TYLENOL) 325 MG tablet Take 2 tablets by mouth every 4 hours as needed for Pain (over the counter medication.   May take for pain or alternate with Ibuprofen) (Patient not taking: Reported on 3/13/2023) 1 tablet 0    ibuprofen (ADVIL;MOTRIN) 600 MG tablet Take 1 tablet by mouth 4 times daily as needed for Pain (May alternate with Tylenol for pain. OVER THE COUNTER MEDICATION) 1 tablet 0     No current facility-administered medications for this visit. OBJECTIVE:  /79 (Site: Right Upper Arm)   Pulse 63   Temp 98 °F (36.7 °C)   Resp 16   Ht 5' 2\" (1.575 m)   Wt 173 lb 11.2 oz (78.8 kg)   SpO2 96%   BMI 31.77 kg/m²   Pain Score:   0 - No pain (Fatigue- 5)     ECO    Physical Exam:  Constitutional: Well developed, well nourished female in no acute distress, sitting comfortably in the exam room chair. HEENT: Normocephalic and atraumatic. Oropharynx is clear, mucous membranes are moist.  Sclerae anicteric. Neck supple without JVD. No thyromegaly present. Lymph node   Deferred   Skin Warm and dry. No bruising and no rash noted. No erythema. No pallor. Respiratory Lungs are clear to auscultation bilaterally without wheezes, rales or rhonchi, normal air exchange without accessory muscle use. CVS Normal rate, regular rhythm and normal S1 and S2. No murmurs, gallops, or rubs. Abdomen Soft, nontender and nondistended, normoactive bowel sounds. No palpable mass. No hepatosplenomegaly. Neuro Grossly nonfocal with no obvious sensory or motor deficits. MSK Normal range of motion in general.  No edema and no tenderness. Psych Appropriate mood and affect.         Labs:  Recent Results (from the past 24 hour(s))   CBC with Auto Differential    Collection Time: 23 12:10 PM   Result Value Ref Range    WBC 7.7 4.3 - 11.1 K/uL    RBC 4.44 4.05 - 5.2 M/uL    Hemoglobin 12.5 11.7 - 15.4 g/dL    Hematocrit 39.5 35.8 - 46.3 %    MCV 89.0 82.0 - 102.0 FL    MCH 28.2 26.1 - 32.9 PG    MCHC 31.6 31.4 - 35.0 g/dL    RDW 16.4 (H) 11.9 - 14.6 %    Platelets 978 042 - 925 K/uL    MPV 8.6 (L) 9.4 - 12.3 FL    nRBC 0.00 0.0 - 0.2 K/uL    Seg Neutrophils 80 (H) 43 - 78 %    Lymphocytes 12 (L) 13 - 44 %    Monocytes 6 4.0 - 12.0 %    Eosinophils % 0 (L) 0.5 - 7.8 %    Basophils 1 0.0 - 2.0 %    Immature Granulocytes 1 0.0 - 5.0 %    Segs Absolute 6.2 1.7 - 8.2 K/UL    Absolute Lymph # 1.0 0.5 - 4.6 K/UL    Absolute Mono # 0.4 0.1 - 1.3 K/UL    Absolute Eos # 0.0 0.0 - 0.8 K/UL    Basophils Absolute 0.0 0.0 - 0.2 K/UL    Absolute Immature Granulocyte 0.0 0.0 - 0.5 K/UL    Differential Type AUTOMATED     Comprehensive Metabolic Panel    Collection Time: 03/13/23 12:10 PM   Result Value Ref Range    Sodium 138 133 - 143 mmol/L    Potassium 4.1 3.5 - 5.1 mmol/L    Chloride 108 101 - 110 mmol/L    CO2 27 21 - 32 mmol/L    Anion Gap 3 2 - 11 mmol/L    Glucose 113 (H) 65 - 100 mg/dL    BUN 11 8 - 23 MG/DL    Creatinine 0.80 0.6 - 1.0 MG/DL    Est, Glom Filt Rate >60 >60 ml/min/1.73m2    Calcium 8.8 8.3 - 10.4 MG/DL    Total Bilirubin 0.5 0.2 - 1.1 MG/DL    ALT 32 12 - 65 U/L    AST 19 15 - 37 U/L    Alk Phosphatase 74 50 - 136 U/L    Total Protein 6.6 6.3 - 8.2 g/dL    Albumin 3.4 3.2 - 4.6 g/dL    Globulin 3.2 2.8 - 4.5 g/dL    Albumin/Globulin Ratio 1.1 0.4 - 1.6         Imaging:  MRI BREAST BILATERAL W WO CONTRAST    Result Date: 10/20/2022  MRI of the Breasts with and without contrast CLINICAL INDICATION:  New diagnosis of right breast 9:00 infiltrating ductal carcinoma high grade undergoing evaluation for extent of disease, staging, therapy planning. No previous personal malignancy or breast surgery in the past otherwise. COMPARISON: Ultrasound and mammography 10/5/2022, 9/27/2022, 9/9/2022 TECHNIQUE: Standard MRI sequences were obtained through the breasts in multiple planes. Images were obtained before and after intravenous infusion of 17 mL of Prohance contrast. Images were reviewed with PACS and with "[employer] CAD software. FINDINGS: The breasts demonstrate moderate glandularity and mild background enhancement.  Right breast: An 9:00 midposterior depth are biopsy changes within the irregular heterogeneously enhancing malignant mass measuring up to 4.0 x 2.6 x 3.3 cm. Anterior to the mass (within 1.5 cm of the anterior margin) system are 2 tiny 3 mm satellite nodules. Overall the MRI area of concern measures up to 6.4 cm AP. Note that on recent mammography, malignant-type calcifications extending anteriorly and posteriorly from the mass for total AP extent of at least 8 cm. Left breast: No evidence of suspicious enhancing mass, and no dominant or unique nonmass enhancement, to suggest malignancy. Lymph nodes: No obvious axillary or internal mammary lymphadenopathy. Several bilateral axillary lymph nodes are relatively symmetric in size and number, all demonstrating preservation of expected reniform shape with fatty grant. Elsewhere colon limited inclusion of the thorax and upper abdomen shows a partially visualized hiatal hernia which is not entirely seen or evaluated here. 1. Right 9:00 breast cancer measures up to 6.4 cm by MRI (recent mammography demonstrated at least 8 cm of expected disease). 2. No suspicious left breast finding. 3. No obvious lymphadenopathy. Recommend continued malignancy management as directed clinically. BI-RADS Assessment Category 6: Known Biopsy Proven Malignancy     ZIA DIGITAL DIAGNOSTIC W OR WO CAD RIGHT    Result Date: 9/27/2022  RIGHT BREAST DIAGNOSTIC MAMMOGRAM and RIGHT BREAST ULTRASOUND, 9/27/2022. CLINICAL HISTORY: Abnormal screening mammogram. COMPARISON STUDY: Screening mammogram 9/9/2020. FINDINGS: RIGHT BREAST DIAGNOSTIC MAMMOGRAM: Straight mediolateral view of the right breast as well as compression images of the right breast in the CC, and MLO plane are submitted for evaluation. Masslike density and associated calcifications persist in the outer right breast. These demonstrate suspicious features with spiculated margins and suggest architectural distortion. Additional associated calcifications also suspicious.  Further characterization with focused diagnostic ultrasound is recommended. RIGHT BREAST ULTRASOUND, 9/27/2022. CLINICAL HISTORY: Abnormal screening and diagnostic mammogram. Technique: Grayscale, and Doppler imaging of the right breast soft tissues was performed using a 15 MHz transducer. FINDINGS: Focused ultrasound imaging at the 9 o'clock position of the right breast in the area of masslike density as seen on prior mammogram imaging shows a hypoechoic shadowing mass measuring 2.7 cm x 3 cm x 1.9 cm demonstrating multiple highly suspicious features such as irregular margins, shadowing, and a vertical configuration for which a benign process cannot be confirmed. 1. Highly suspicious right breast mass. Further evaluation with ultrasound-guided biopsy is recommended. BI-RADS Assessment Category 5: Highly suggestive of malignancy- Appropriate action should be taken. US BREAST LIMITED RIGHT    Result Date: 9/27/2022  RIGHT BREAST DIAGNOSTIC MAMMOGRAM and RIGHT BREAST ULTRASOUND, 9/27/2022. CLINICAL HISTORY: Abnormal screening mammogram. COMPARISON STUDY: Screening mammogram 9/9/2020. FINDINGS: RIGHT BREAST DIAGNOSTIC MAMMOGRAM: Straight mediolateral view of the right breast as well as compression images of the right breast in the CC, and MLO plane are submitted for evaluation. Masslike density and associated calcifications persist in the outer right breast. These demonstrate suspicious features with spiculated margins and suggest architectural distortion. Additional associated calcifications also suspicious. Further characterization with focused diagnostic ultrasound is recommended. RIGHT BREAST ULTRASOUND, 9/27/2022. CLINICAL HISTORY: Abnormal screening and diagnostic mammogram. Technique: Grayscale, and Doppler imaging of the right breast soft tissues was performed using a 15 MHz transducer.  FINDINGS: Focused ultrasound imaging at the 9 o'clock position of the right breast in the area of masslike density as seen on prior mammogram imaging shows a hypoechoic shadowing mass measuring 2.7 cm x 3 cm x 1.9 cm demonstrating multiple highly suspicious features such as irregular margins, shadowing, and a vertical configuration for which a benign process cannot be confirmed. 1. Highly suspicious right breast mass. Further evaluation with ultrasound-guided biopsy is recommended. BI-RADS Assessment Category 5: Highly suggestive of malignancy- Appropriate action should be taken. MAMMOGRAM POST BX CLIP PLACEMENT RIGHT    Result Date: 10/5/2022  POSTBIOPSY MAMMOGRAM, 10/5/2022. CLINICAL HISTORY: Status post percutaneous biopsy. FINDINGS: CC, ML views of the right breast demonstrate the biopsy clip in the outer soft tissues of the right breast. No significant post biopsy hematoma is seen. There has been successful placement of the biopsy clip occurring within the more lateral aspect of the indeterminate mass. 1. Successful biopsy and placement of marker clip. This is a post biopsy mammogram and does not require BI-RADS categorization. US BREAST BIOPSY W LOC DEVICE 1ST LESION RIGHT    Addendum Date: 10/12/2022    Addendum: Jaylin Spaulding, accession number: [id number] Date: 10/5/2022 Pathology was noted as A: Right breast, 9:00 position, 12 cm from nipple, core biopsy: Infiltrating ductal carcinoma, high grade (poorly differentiated). Definite in situ component and lymphovascular invasion are not identified. Results concordant with imaging. Pathology report was faxed to  [address] [place] [address]'s office on 10/6/2022 by MARILYN Levy)(EVELIN). Patient was referred to Oncology services on 10/6/2022. Result Date: 10/12/2022  Ultrasound-guided right breast biopsy, 10/5/2022. CLINICAL HISTORY: Right breast lesion. PROCEDURE: After obtaining written informed consent, the patient was placed in a supine position on the ultrasound table.  Preliminary imaging demonstrates the 2.5 cm x 2.5 cm x 2.4 cm hypoechoic lesion at the 9 o'clock position of the right breast. The right breast was prepped and draped in the usual sterile fashion. Lidocaine was used for local anesthesia. Using ultrasound guidance, a 14-gauge Assure needle was positioned just proximal to the lesion with the sampling trough subsequently placed through the lesion. A total of 5 samples were taken with ultrasound documentation of each pass. A biopsy clip was placed at the site of biopsy. The biopsy apparatus was removed and hemostasis was achieved. No procedure or immediate postprocedure complications were noted. The patient left the department in good condition.     1. Successful biopsy of right breast lesion with histologic results pending.       Pathology:  DIAGNOSIS        A:  \"RIGHT BREAST\":  POORLY DIFFERENTIATED (3 + 2 + 3), 4.2 CM IN   GREATEST DIMENSION, MARGINS UNINVOLVED; FIBROCYSTIC MASTOPATHY;   INTRADUCTAL PAPILLOMA.        B:  \"RIGHT AXILLARY SENTINEL NODES\":  THREE BENIGN LYMPH NODES, ALL   NEGATIVE FOR TUMOR.           tls/12/22/2022     Sign Out Date: 12/22/2022  Yaya Dolan III, MD       Procedures/Addenda                     STF - ONCOTYPE DX ASSAY                                                                                                                                         Status:  Signed Out    Yaya Dolan III, MD on 1/6/2023                                 Interpretation                       Oncotype DX Breast Recurrence Score:  25       DIAGNOSIS        A:  \" RIGHT BREAST, 9:00 POSITION, 12 CM FROM NIPPLE, CORE BIOPSY\":         INFILTRATING DUCTAL CARCINOMA, HIGH GRADE (POORLY DIFFERENTIATED).   DEFINITE IN SITU COMPONENT AND LYMPHOVASCULAR INVASION ARE NOT IDENTIFIED             Procedures/Addenda                     STF- ER/MI/RKG4TFL BY IHC                                                                                                                                         Status:  Signed Out    Deon Russo MD on 10/10/2022                      Interpretation                       [facility] IHC Quantitative Breast Panel Result: A1     TEST NAME:                         RESULTS:               INTERNAL   CONTROLS:     ESTROGEN RECEPTOR:               Positive (98%)               Present,   positive   PROGESTERONE RECEPTOR:          Positive (92%)               Present,   positive   HER-2/SHARDA:                         Negative (0)               Percentage   of Cells with Uniform        Intense Complete Membrane   Stainin%                   ASSESSMENT:   Diagnosis Orders   1. Malignant neoplasm of overlapping sites of right breast in female, estrogen receptor positive ([address]  CBC With Auto Differential    Comprehensive metabolic panel      2. Chemotherapy-induced nausea        3. Drug induced constipation              Patient Active Problem List   Diagnosis    Elevated liver function tests    Dyslipidemia    Weight gain    Renal insufficiency    Malignant neoplasm of overlapping sites of right breast in female, estrogen receptor positive ([address]    Primary hypertension    Ductal carcinoma in situ of right breast    Breast cancer in female [facility])    Neutropenic fever ([address]    Thrush           PLAN:  Lab studies and imaging studies were personally reviewed. Pertinent old records were reviewed. Breast cancer: 3cm by ultrasound but up to 6.4 cm on MRI (including two tiny satellite nodules), high grade, ER/OK strongly positive, HER2 0. Upfront mastectomy shows the tumor to be 4.2 cm with 3 negative sentinel nodes, pT2pN0. Because the tumor was under 4.5 cm we recommended Oncotype Dx, which returned at 25. Although this was technically in the range included in [address], her clinicopathologic features including the size and grade are concerning. Indeed, when entered into RSClin, her risk of recurrence is 35% with a reduction from chemotherapy of 14%.   This is well above the threshold for appropriateness of chemotherapy, I would recommend TC for 4 cycles. This would be followed by adjuvant endocrine therapy, she does not require radiation. She returns today for follow up prior to cycle 3 TC, overall, C2 was much better than cycle 1. Nausea is well controlled with anti-emetics and constipation is controlled with prunes/juice. There was no thrush this cycle. Taste changes are present, however she continues to eat/drink well, weight is stable. Mild fatigue is present as expected and she has been able to work some after the 1st week of treatment. There is no cough, shortness of breath, or edema. No neuropathy. G-CSF bone pain was minimal.  Following steroids she does have some flushing of her face and chest, no rash. She denies any fevers or infectious symptoms. Labs reviewed and adequate to proceed with C3 TC tomorrow as planned. All questions were asked and answered to the best of my ability. F/u for cycle 4 TC in 3 weeks.              DERIK Lozada [id number] Hematology and Oncology  [address] [place] [address]  Office : [phone]  Fax : [phone]

## 2023-03-14 ENCOUNTER — HOSPITAL ENCOUNTER (OUTPATIENT)
Dept: INFUSION THERAPY | Age: 68
Discharge: HOME | End: 2023-03-14
Payer: MEDICARE

## 2023-03-14 VITALS
RESPIRATION RATE: 18 BRPM | OXYGEN SATURATION: 99 % | WEIGHT: 174.6 LBS | HEART RATE: 69 BPM | BODY MASS INDEX: 31.93 KG/M2 | DIASTOLIC BLOOD PRESSURE: 77 MMHG | TEMPERATURE: 97.2 F | SYSTOLIC BLOOD PRESSURE: 152 MMHG

## 2023-03-14 DIAGNOSIS — C50.811 MALIGNANT NEOPLASM OF OVERLAPPING SITES OF RIGHT BREAST IN FEMALE, ESTROGEN RECEPTOR POSITIVE (HCC): Primary | ICD-10-CM

## 2023-03-14 DIAGNOSIS — Z17.0 MALIGNANT NEOPLASM OF OVERLAPPING SITES OF RIGHT BREAST IN FEMALE, ESTROGEN RECEPTOR POSITIVE (HCC): Primary | ICD-10-CM

## 2023-03-14 PROCEDURE — 6360000002 HC RX W HCPCS: Performed by: NURSE PRACTITIONER

## 2023-03-14 PROCEDURE — 96413 CHEMO IV INFUSION 1 HR: CPT

## 2023-03-14 PROCEDURE — 96375 TX/PRO/DX INJ NEW DRUG ADDON: CPT

## 2023-03-14 PROCEDURE — 2580000003 HC RX 258: Performed by: NURSE PRACTITIONER

## 2023-03-14 PROCEDURE — 96417 CHEMO IV INFUS EACH ADDL SEQ: CPT

## 2023-03-14 RX ORDER — ONDANSETRON 2 MG/ML
8 INJECTION INTRAMUSCULAR; INTRAVENOUS
Status: DISCONTINUED | OUTPATIENT
Start: 2023-03-14 | End: 2023-03-15 | Stop reason: HOSPADM

## 2023-03-14 RX ORDER — ALBUTEROL SULFATE 90 UG/1
4 AEROSOL, METERED RESPIRATORY (INHALATION) PRN
Status: DISCONTINUED | OUTPATIENT
Start: 2023-03-14 | End: 2023-03-15 | Stop reason: HOSPADM

## 2023-03-14 RX ORDER — EPINEPHRINE 1 MG/ML
0.3 INJECTION, SOLUTION, CONCENTRATE INTRAVENOUS PRN
Status: DISCONTINUED | OUTPATIENT
Start: 2023-03-14 | End: 2023-03-15 | Stop reason: HOSPADM

## 2023-03-14 RX ORDER — SODIUM CHLORIDE 9 MG/ML
5-40 INJECTION INTRAVENOUS PRN
Status: DISCONTINUED | OUTPATIENT
Start: 2023-03-14 | End: 2023-03-15 | Stop reason: HOSPADM

## 2023-03-14 RX ORDER — ONDANSETRON 2 MG/ML
8 INJECTION INTRAMUSCULAR; INTRAVENOUS ONCE
Status: COMPLETED | OUTPATIENT
Start: 2023-03-14 | End: 2023-03-14

## 2023-03-14 RX ORDER — DIPHENHYDRAMINE HYDROCHLORIDE 50 MG/ML
50 INJECTION INTRAMUSCULAR; INTRAVENOUS
Status: DISCONTINUED | OUTPATIENT
Start: 2023-03-14 | End: 2023-03-15 | Stop reason: HOSPADM

## 2023-03-14 RX ORDER — ACETAMINOPHEN 325 MG/1
650 TABLET ORAL
Status: DISCONTINUED | OUTPATIENT
Start: 2023-03-14 | End: 2023-03-15 | Stop reason: HOSPADM

## 2023-03-14 RX ORDER — MEPERIDINE HYDROCHLORIDE 25 MG/ML
12.5 INJECTION INTRAMUSCULAR; INTRAVENOUS; SUBCUTANEOUS PRN
Status: DISCONTINUED | OUTPATIENT
Start: 2023-03-14 | End: 2023-03-15 | Stop reason: HOSPADM

## 2023-03-14 RX ORDER — SODIUM CHLORIDE 9 MG/ML
5-250 INJECTION, SOLUTION INTRAVENOUS PRN
Status: DISCONTINUED | OUTPATIENT
Start: 2023-03-14 | End: 2023-03-15 | Stop reason: HOSPADM

## 2023-03-14 RX ORDER — SODIUM CHLORIDE 9 MG/ML
INJECTION, SOLUTION INTRAVENOUS CONTINUOUS
Status: DISCONTINUED | OUTPATIENT
Start: 2023-03-14 | End: 2023-03-15 | Stop reason: HOSPADM

## 2023-03-14 RX ADMIN — SODIUM CHLORIDE, PRESERVATIVE FREE 10 ML: 5 INJECTION INTRAVENOUS at 12:21

## 2023-03-14 RX ADMIN — ONDANSETRON 8 MG: 2 INJECTION INTRAMUSCULAR; INTRAVENOUS at 09:36

## 2023-03-14 RX ADMIN — CYCLOPHOSPHAMIDE 1120 MG: 500 INJECTION, POWDER, FOR SOLUTION INTRAVENOUS; ORAL at 11:46

## 2023-03-14 RX ADMIN — DEXAMETHASONE SODIUM PHOSPHATE 12 MG: 4 INJECTION, SOLUTION INTRAMUSCULAR; INTRAVENOUS at 09:37

## 2023-03-14 RX ADMIN — SODIUM CHLORIDE 50 ML/HR: 9 INJECTION, SOLUTION INTRAVENOUS at 09:10

## 2023-03-14 RX ADMIN — DOCETAXEL ANHYDROUS 140 MG: 10 INJECTION, SOLUTION INTRAVENOUS at 10:35

## 2023-03-14 NOTE — PROGRESS NOTES
Pt arrived ambulatory. Premeds, Taxotere and Cytoxan completed without complications. Pt aware of next appt tomorrow at 1500. Discharged ambulatory, no distress noted.   Patient instructed to call provider with temperature of 100.4 or greater or nausea/vomiting/ diarrhea or pain not controlled by medications

## 2023-03-15 ENCOUNTER — HOSPITAL ENCOUNTER (OUTPATIENT)
Dept: INFUSION THERAPY | Age: 68
Discharge: HOME | End: 2023-03-15
Payer: MEDICARE

## 2023-03-15 VITALS
RESPIRATION RATE: 18 BRPM | TEMPERATURE: 98 F | HEART RATE: 76 BPM | SYSTOLIC BLOOD PRESSURE: 132 MMHG | DIASTOLIC BLOOD PRESSURE: 78 MMHG | OXYGEN SATURATION: 97 %

## 2023-03-15 DIAGNOSIS — Z17.0 MALIGNANT NEOPLASM OF OVERLAPPING SITES OF RIGHT BREAST IN FEMALE, ESTROGEN RECEPTOR POSITIVE (HCC): Primary | ICD-10-CM

## 2023-03-15 DIAGNOSIS — C50.811 MALIGNANT NEOPLASM OF OVERLAPPING SITES OF RIGHT BREAST IN FEMALE, ESTROGEN RECEPTOR POSITIVE (HCC): Primary | ICD-10-CM

## 2023-03-15 PROCEDURE — 96372 THER/PROPH/DIAG INJ SC/IM: CPT

## 2023-03-15 PROCEDURE — 6360000002 HC RX W HCPCS: Performed by: NURSE PRACTITIONER

## 2023-03-15 RX ADMIN — PEGFILGRASTIM 6 MG: 6 INJECTION SUBCUTANEOUS at 15:37

## 2023-03-15 NOTE — PROGRESS NOTES
Arrived to the [facility]. Juan completed. Patient tolerated well. Any issues or concerns during appointment: none. Patient instructed to call provider with temperature of 100.4 or greater or nausea/vomiting/ diarrhea or pain not controlled by medications. Discharged ambulatory.

## 2023-03-31 DIAGNOSIS — Z17.0 MALIGNANT NEOPLASM OF OVERLAPPING SITES OF RIGHT BREAST IN FEMALE, ESTROGEN RECEPTOR POSITIVE (HCC): Primary | ICD-10-CM

## 2023-03-31 DIAGNOSIS — C50.811 MALIGNANT NEOPLASM OF OVERLAPPING SITES OF RIGHT BREAST IN FEMALE, ESTROGEN RECEPTOR POSITIVE (HCC): Primary | ICD-10-CM

## 2023-04-03 ENCOUNTER — OFFICE VISIT (OUTPATIENT)
Dept: ONCOLOGY | Age: 68
End: 2023-04-03
Payer: MEDICARE

## 2023-04-03 ENCOUNTER — HOSPITAL ENCOUNTER (OUTPATIENT)
Dept: LAB | Age: 68
Discharge: HOME | End: 2023-04-06
Payer: MEDICARE

## 2023-04-03 VITALS
SYSTOLIC BLOOD PRESSURE: 118 MMHG | TEMPERATURE: 97.9 F | WEIGHT: 169.8 LBS | RESPIRATION RATE: 23 BRPM | HEIGHT: 62 IN | BODY MASS INDEX: 31.25 KG/M2 | HEART RATE: 104 BPM | DIASTOLIC BLOOD PRESSURE: 69 MMHG | OXYGEN SATURATION: 96 %

## 2023-04-03 DIAGNOSIS — T45.1X5A CHEMOTHERAPY-INDUCED NAUSEA: ICD-10-CM

## 2023-04-03 DIAGNOSIS — R11.0 CHEMOTHERAPY-INDUCED NAUSEA: ICD-10-CM

## 2023-04-03 DIAGNOSIS — Z17.0 MALIGNANT NEOPLASM OF OVERLAPPING SITES OF RIGHT BREAST IN FEMALE, ESTROGEN RECEPTOR POSITIVE (HCC): Primary | ICD-10-CM

## 2023-04-03 DIAGNOSIS — K21.9 GASTROESOPHAGEAL REFLUX DISEASE, UNSPECIFIED WHETHER ESOPHAGITIS PRESENT: ICD-10-CM

## 2023-04-03 DIAGNOSIS — R53.83 FATIGUE DUE TO TREATMENT: ICD-10-CM

## 2023-04-03 DIAGNOSIS — C50.811 MALIGNANT NEOPLASM OF OVERLAPPING SITES OF RIGHT BREAST IN FEMALE, ESTROGEN RECEPTOR POSITIVE (HCC): ICD-10-CM

## 2023-04-03 DIAGNOSIS — Z17.0 MALIGNANT NEOPLASM OF OVERLAPPING SITES OF RIGHT BREAST IN FEMALE, ESTROGEN RECEPTOR POSITIVE (HCC): ICD-10-CM

## 2023-04-03 DIAGNOSIS — K59.00 CONSTIPATION, UNSPECIFIED CONSTIPATION TYPE: ICD-10-CM

## 2023-04-03 DIAGNOSIS — C50.811 MALIGNANT NEOPLASM OF OVERLAPPING SITES OF RIGHT BREAST IN FEMALE, ESTROGEN RECEPTOR POSITIVE (HCC): Primary | ICD-10-CM

## 2023-04-03 LAB
ALBUMIN SERPL-MCNC: 3.4 G/DL (ref 3.2–4.6)
ALBUMIN/GLOB SERPL: 1 (ref 0.4–1.6)
ALP SERPL-CCNC: 80 U/L (ref 50–136)
ALT SERPL-CCNC: 24 U/L (ref 12–65)
ANION GAP SERPL CALC-SCNC: 4 MMOL/L (ref 2–11)
AST SERPL-CCNC: 19 U/L (ref 15–37)
BASOPHILS # BLD: 0 K/UL (ref 0–0.2)
BASOPHILS NFR BLD: 0 % (ref 0–2)
BILIRUB SERPL-MCNC: 0.7 MG/DL (ref 0.2–1.1)
BUN SERPL-MCNC: 11 MG/DL (ref 8–23)
CALCIUM SERPL-MCNC: 9.2 MG/DL (ref 8.3–10.4)
CHLORIDE SERPL-SCNC: 108 MMOL/L (ref 101–110)
CO2 SERPL-SCNC: 26 MMOL/L (ref 21–32)
CREAT SERPL-MCNC: 0.9 MG/DL (ref 0.6–1)
DIFFERENTIAL METHOD BLD: ABNORMAL
EOSINOPHIL # BLD: 0 K/UL (ref 0–0.8)
EOSINOPHIL NFR BLD: 0 % (ref 0.5–7.8)
ERYTHROCYTE [DISTWIDTH] IN BLOOD BY AUTOMATED COUNT: 18.4 % (ref 11.9–14.6)
GLOBULIN SER CALC-MCNC: 3.4 G/DL (ref 2.8–4.5)
GLUCOSE SERPL-MCNC: 150 MG/DL (ref 65–100)
HCT VFR BLD AUTO: 36.8 % (ref 35.8–46.3)
HGB BLD-MCNC: 11.7 G/DL (ref 11.7–15.4)
IMM GRANULOCYTES # BLD AUTO: 0.1 K/UL (ref 0–0.5)
IMM GRANULOCYTES NFR BLD AUTO: 1 % (ref 0–5)
LYMPHOCYTES # BLD: 0.6 K/UL (ref 0.5–4.6)
LYMPHOCYTES NFR BLD: 7 % (ref 13–44)
MCH RBC QN AUTO: 28.7 PG (ref 26.1–32.9)
MCHC RBC AUTO-ENTMCNC: 31.8 G/DL (ref 31.4–35)
MCV RBC AUTO: 90.4 FL (ref 82–102)
MONOCYTES # BLD: 0.2 K/UL (ref 0.1–1.3)
MONOCYTES NFR BLD: 2 % (ref 4–12)
NEUTS SEG # BLD: 8.3 K/UL (ref 1.7–8.2)
NEUTS SEG NFR BLD: 91 % (ref 43–78)
NRBC # BLD: 0 K/UL (ref 0–0.2)
PLATELET # BLD AUTO: 319 K/UL (ref 150–450)
PMV BLD AUTO: 8.7 FL (ref 9.4–12.3)
POTASSIUM SERPL-SCNC: 4.1 MMOL/L (ref 3.5–5.1)
PROT SERPL-MCNC: 6.8 G/DL (ref 6.3–8.2)
RBC # BLD AUTO: 4.07 M/UL (ref 4.05–5.2)
SODIUM SERPL-SCNC: 138 MMOL/L (ref 133–143)
WBC # BLD AUTO: 9.1 K/UL (ref 4.3–11.1)

## 2023-04-03 PROCEDURE — 1090F PRES/ABSN URINE INCON ASSESS: CPT | Performed by: NURSE PRACTITIONER

## 2023-04-03 PROCEDURE — 1123F ACP DISCUSS/DSCN MKR DOCD: CPT | Performed by: NURSE PRACTITIONER

## 2023-04-03 PROCEDURE — 99214 OFFICE O/P EST MOD 30 MIN: CPT | Performed by: NURSE PRACTITIONER

## 2023-04-03 PROCEDURE — G8399 PT W/DXA RESULTS DOCUMENT: HCPCS | Performed by: NURSE PRACTITIONER

## 2023-04-03 PROCEDURE — G8417 CALC BMI ABV UP PARAM F/U: HCPCS | Performed by: NURSE PRACTITIONER

## 2023-04-03 PROCEDURE — 80053 COMPREHEN METABOLIC PANEL: CPT

## 2023-04-03 PROCEDURE — 3017F COLORECTAL CA SCREEN DOC REV: CPT | Performed by: NURSE PRACTITIONER

## 2023-04-03 PROCEDURE — 85025 COMPLETE CBC W/AUTO DIFF WBC: CPT

## 2023-04-03 PROCEDURE — 3074F SYST BP LT 130 MM HG: CPT | Performed by: NURSE PRACTITIONER

## 2023-04-03 PROCEDURE — 3078F DIAST BP <80 MM HG: CPT | Performed by: NURSE PRACTITIONER

## 2023-04-03 PROCEDURE — G8427 DOCREV CUR MEDS BY ELIG CLIN: HCPCS | Performed by: NURSE PRACTITIONER

## 2023-04-03 PROCEDURE — 36415 COLL VENOUS BLD VENIPUNCTURE: CPT

## 2023-04-03 PROCEDURE — 1036F TOBACCO NON-USER: CPT | Performed by: NURSE PRACTITIONER

## 2023-04-03 RX ORDER — SODIUM CHLORIDE 9 MG/ML
5-250 INJECTION, SOLUTION INTRAVENOUS PRN
Status: CANCELLED | OUTPATIENT
Start: 2023-04-04

## 2023-04-03 RX ORDER — ONDANSETRON 2 MG/ML
8 INJECTION INTRAMUSCULAR; INTRAVENOUS
Status: CANCELLED | OUTPATIENT
Start: 2023-04-04

## 2023-04-03 RX ORDER — FAMOTIDINE 10 MG/ML
20 INJECTION, SOLUTION INTRAVENOUS
Status: CANCELLED | OUTPATIENT
Start: 2023-04-04

## 2023-04-03 RX ORDER — ONDANSETRON 2 MG/ML
8 INJECTION INTRAMUSCULAR; INTRAVENOUS ONCE
Status: CANCELLED | OUTPATIENT
Start: 2023-04-04 | End: 2023-04-04

## 2023-04-03 RX ORDER — ALBUTEROL SULFATE 90 UG/1
4 AEROSOL, METERED RESPIRATORY (INHALATION) PRN
Status: CANCELLED | OUTPATIENT
Start: 2023-04-04

## 2023-04-03 RX ORDER — MEPERIDINE HYDROCHLORIDE 50 MG/ML
12.5 INJECTION INTRAMUSCULAR; INTRAVENOUS; SUBCUTANEOUS PRN
Status: CANCELLED | OUTPATIENT
Start: 2023-04-04

## 2023-04-03 RX ORDER — SODIUM CHLORIDE 9 MG/ML
5-40 INJECTION INTRAVENOUS PRN
Status: CANCELLED | OUTPATIENT
Start: 2023-04-04

## 2023-04-03 RX ORDER — EPINEPHRINE 1 MG/ML
0.3 INJECTION, SOLUTION, CONCENTRATE INTRAVENOUS PRN
Status: CANCELLED | OUTPATIENT
Start: 2023-04-04

## 2023-04-03 RX ORDER — HEPARIN SODIUM (PORCINE) LOCK FLUSH IV SOLN 100 UNIT/ML 100 UNIT/ML
500 SOLUTION INTRAVENOUS PRN
Status: CANCELLED | OUTPATIENT
Start: 2023-04-04

## 2023-04-03 RX ORDER — SODIUM CHLORIDE 9 MG/ML
INJECTION, SOLUTION INTRAVENOUS CONTINUOUS
Status: CANCELLED | OUTPATIENT
Start: 2023-04-04

## 2023-04-03 RX ORDER — ACETAMINOPHEN 325 MG/1
650 TABLET ORAL
Status: CANCELLED | OUTPATIENT
Start: 2023-04-04

## 2023-04-03 RX ORDER — DIPHENHYDRAMINE HYDROCHLORIDE 50 MG/ML
50 INJECTION INTRAMUSCULAR; INTRAVENOUS
Status: CANCELLED | OUTPATIENT
Start: 2023-04-04

## 2023-04-03 ASSESSMENT — PATIENT HEALTH QUESTIONNAIRE - PHQ9
SUM OF ALL RESPONSES TO PHQ QUESTIONS 1-9: 0
1. LITTLE INTEREST OR PLEASURE IN DOING THINGS: 0
SUM OF ALL RESPONSES TO PHQ QUESTIONS 1-9: 0
2. FEELING DOWN, DEPRESSED OR HOPELESS: 0
SUM OF ALL RESPONSES TO PHQ9 QUESTIONS 1 & 2: 0

## 2023-04-03 NOTE — PATIENT INSTRUCTIONS
Patient Instructions from Today's Visit    Reason for Visit:  Follow up-Breast    Diagnosis Information:  [email or web address]-[email or web address]-[email or web address]-[email or web address]-education-materials/[email or web address]-cnmt-sheets      Plan:      Follow Up:  As scheduled    Recent Lab Results:  Hospital Outpatient Visit on 04/03/2023   Component Date Value Ref Range Status    WBC 04/03/2023 9.1  4.3 - 11.1 K/uL Final    RBC 04/03/2023 4.07  4.05 - 5.2 M/uL Final    Hemoglobin 04/03/2023 11.7  11.7 - 15.4 g/dL Final    Hematocrit 04/03/2023 36.8  35.8 - 46.3 % Final    MCV 04/03/2023 90.4  82.0 - 102.0 FL Final    MCH 04/03/2023 28.7  26.1 - 32.9 PG Final    MCHC 04/03/2023 31.8  31.4 - 35.0 g/dL Final    RDW 04/03/2023 18.4 (H)  11.9 - 14.6 % Final    Platelets 03/03/5068 319  150 - 450 K/uL Final    MPV 04/03/2023 8.7 (L)  9.4 - 12.3 FL Final    nRBC 04/03/2023 0.00  0.0 - 0.2 K/uL Final    **Note: Absolute NRBC parameter is now reported with Hemogram**    Differential Type 04/03/2023 AUTOMATED    Final    Seg Neutrophils 04/03/2023 91 (H)  43 - 78 % Final    Lymphocytes 04/03/2023 7 (L)  13 - 44 % Final    Monocytes 04/03/2023 2 (L)  4.0 - 12.0 % Final    Eosinophils % 04/03/2023 0 (L)  0.5 - 7.8 % Final    Basophils 04/03/2023 0  0.0 - 2.0 % Final    Immature Granulocytes 04/03/2023 1  0.0 - 5.0 % Final    Segs Absolute 04/03/2023 8.3 (H)  1.7 - 8.2 K/UL Final    Absolute Lymph # 04/03/2023 0.6  0.5 - 4.6 K/UL Final    Absolute Mono # 04/03/2023 0.2  0.1 - 1.3 K/UL Final    Absolute Eos # 04/03/2023 0.0  0.0 - 0.8 K/UL Final    Basophils Absolute 04/03/2023 0.0  0.0 - 0.2 K/UL Final    Absolute Immature Granulocyte 04/03/2023 0.1  0.0 - 0.5 K/UL Final         Treatment Summary has been discussed and given to patient: n/a        -------------------------------------------------------------------------------------------------------------------  Please call our office at [phone] if you have any  of the following symptoms:   Fever of

## 2023-04-03 NOTE — LETTER
April 3, 2023      Gwen Wiley MD  [address] [facility] [place]       Patient: Rashad Senior   MR Number: [id number]   YOB: 1955   Date of Visit: 4/3/2023       Dear Gwen Wiley:    Thank you for referring Nila Antony to me for evaluation/treatment. Below are the relevant portions of my assessment and plan of care. If you have questions, please do not hesitate to call me. I look forward to following [facility] along with you.     Sincerely,        MARYAM Huffman

## 2023-04-03 NOTE — PROGRESS NOTES
fever 1973    Elevated liver function tests     Hyperlipidemia     managed with medication    Hypertension     managed with medication    Malignant neoplasm of overlapping sites of right breast in female, estrogen receptor positive ([facility] [address] 2022    Neutropenic fever ([facility] [address] 2/6/2023    Renal insufficiency      Past Surgical History:   Procedure Laterality Date    BREAST BIOPSY Right 12/20/2022    RIGHT SENTINEL NODE BIOPSY  performed by Ирина Spencer MD at [facility] Right 12/20/2022    RIGHT BREAST MASTECTOMY performed by Ирина Spencer MD at [address] [place] [address]      right lymph node excision - neck    US BREAST BIOPSY W LOC DEVICE 1ST LESION RIGHT Right 10/05/2022    US BREAST NEEDLE BIOPSY RIGHT 10/5/2022 Shonna Flores MD [facility]     Family History   Problem Relation Age of Onset    Thyroid Disease Mother     Heart Disease Mother     High Blood Pressure Father     Heart Disease Father     Thyroid Disease Father     Cancer Sister 48        lung    Diabetes Neg Hx      Social History     Socioeconomic History    Marital status:      Spouse name: Not on file    Number of children: Not on file    Years of education: Not on file    Highest education level: Not on file   Occupational History    Not on file   Tobacco Use    Smoking status: Never    Smokeless tobacco: Never   Vaping Use    Vaping Use: Never used   Substance and Sexual Activity    Alcohol use: Not Currently     Comment: Socially    Drug use: Never    Sexual activity: Not on file   Other Topics Concern    Not on file   Social History Narrative    Not on file     Social Determinants of Health     Financial Resource Strain: Low Risk     Difficulty of Paying Living Expenses: Not hard at all   Food Insecurity: No Food Insecurity    Worried About Running Out of Food in the Last Year: Never true    Ran Out of Food in the Last Year: Never true   Transportation Needs: No Transportation Needs    Lack

## 2023-04-04 ENCOUNTER — HOSPITAL ENCOUNTER (OUTPATIENT)
Dept: INFUSION THERAPY | Age: 68
Discharge: HOME | End: 2023-04-04
Payer: MEDICARE

## 2023-04-04 VITALS
HEART RATE: 105 BPM | DIASTOLIC BLOOD PRESSURE: 56 MMHG | RESPIRATION RATE: 18 BRPM | WEIGHT: 171.2 LBS | SYSTOLIC BLOOD PRESSURE: 124 MMHG | TEMPERATURE: 97.7 F | OXYGEN SATURATION: 98 % | BODY MASS INDEX: 31.31 KG/M2

## 2023-04-04 DIAGNOSIS — C50.811 MALIGNANT NEOPLASM OF OVERLAPPING SITES OF RIGHT BREAST IN FEMALE, ESTROGEN RECEPTOR POSITIVE (HCC): Primary | ICD-10-CM

## 2023-04-04 DIAGNOSIS — Z17.0 MALIGNANT NEOPLASM OF OVERLAPPING SITES OF RIGHT BREAST IN FEMALE, ESTROGEN RECEPTOR POSITIVE (HCC): Primary | ICD-10-CM

## 2023-04-04 PROCEDURE — 96375 TX/PRO/DX INJ NEW DRUG ADDON: CPT

## 2023-04-04 PROCEDURE — 6360000002 HC RX W HCPCS: Performed by: NURSE PRACTITIONER

## 2023-04-04 PROCEDURE — 96413 CHEMO IV INFUSION 1 HR: CPT

## 2023-04-04 PROCEDURE — 2580000003 HC RX 258: Performed by: NURSE PRACTITIONER

## 2023-04-04 PROCEDURE — 96417 CHEMO IV INFUS EACH ADDL SEQ: CPT

## 2023-04-04 PROCEDURE — 96367 TX/PROPH/DG ADDL SEQ IV INF: CPT

## 2023-04-04 RX ORDER — ACETAMINOPHEN 325 MG/1
650 TABLET ORAL
Status: DISCONTINUED | OUTPATIENT
Start: 2023-04-04 | End: 2023-04-05 | Stop reason: HOSPADM

## 2023-04-04 RX ORDER — ONDANSETRON 2 MG/ML
8 INJECTION INTRAMUSCULAR; INTRAVENOUS ONCE
Status: COMPLETED | OUTPATIENT
Start: 2023-04-04 | End: 2023-04-04

## 2023-04-04 RX ORDER — SODIUM CHLORIDE 9 MG/ML
5-250 INJECTION, SOLUTION INTRAVENOUS PRN
Status: DISCONTINUED | OUTPATIENT
Start: 2023-04-04 | End: 2023-04-05 | Stop reason: HOSPADM

## 2023-04-04 RX ORDER — DIPHENHYDRAMINE HYDROCHLORIDE 50 MG/ML
50 INJECTION INTRAMUSCULAR; INTRAVENOUS
Status: DISCONTINUED | OUTPATIENT
Start: 2023-04-04 | End: 2023-04-05 | Stop reason: HOSPADM

## 2023-04-04 RX ORDER — ONDANSETRON 2 MG/ML
8 INJECTION INTRAMUSCULAR; INTRAVENOUS
Status: DISCONTINUED | OUTPATIENT
Start: 2023-04-04 | End: 2023-04-05 | Stop reason: HOSPADM

## 2023-04-04 RX ORDER — SODIUM CHLORIDE 9 MG/ML
5-40 INJECTION INTRAVENOUS PRN
Status: DISCONTINUED | OUTPATIENT
Start: 2023-04-04 | End: 2023-04-05 | Stop reason: HOSPADM

## 2023-04-04 RX ADMIN — SODIUM CHLORIDE 100 ML/HR: 9 INJECTION, SOLUTION INTRAVENOUS at 09:00

## 2023-04-04 RX ADMIN — ONDANSETRON 8 MG: 2 INJECTION INTRAMUSCULAR; INTRAVENOUS at 09:00

## 2023-04-04 RX ADMIN — CYCLOPHOSPHAMIDE 1120 MG: 1 INJECTION, POWDER, FOR SOLUTION INTRAVENOUS; ORAL at 10:51

## 2023-04-04 RX ADMIN — DEXAMETHASONE SODIUM PHOSPHATE 12 MG: 4 INJECTION, SOLUTION INTRAMUSCULAR; INTRAVENOUS at 09:04

## 2023-04-04 RX ADMIN — DOCETAXEL ANHYDROUS 140 MG: 10 INJECTION, SOLUTION INTRAVENOUS at 09:48

## 2023-04-04 NOTE — PROGRESS NOTES
Arrived to the [facility]. Taxotere and Cytoxan completed. Patient tolerated without problems  Any issues or concerns during appointment: order obtained to use [facility] for IV site today  Patient aware of next infusion appointment on 4/5/23 (date) [id number] (time). Patient aware of next lab an  Patient instructed to call provider with temperature of 100.4 or greater or nausea/vomiting/ diarrhea or pain not controlled by medications  Discharged ambulatory with family.

## 2023-04-05 ENCOUNTER — HOSPITAL ENCOUNTER (OUTPATIENT)
Dept: INFUSION THERAPY | Age: 68
Discharge: HOME | End: 2023-04-05
Payer: MEDICARE

## 2023-04-05 VITALS
RESPIRATION RATE: 18 BRPM | SYSTOLIC BLOOD PRESSURE: 117 MMHG | TEMPERATURE: 98.4 F | DIASTOLIC BLOOD PRESSURE: 64 MMHG | HEART RATE: 91 BPM | OXYGEN SATURATION: 99 %

## 2023-04-05 DIAGNOSIS — C50.811 MALIGNANT NEOPLASM OF OVERLAPPING SITES OF RIGHT BREAST IN FEMALE, ESTROGEN RECEPTOR POSITIVE (HCC): Primary | ICD-10-CM

## 2023-04-05 DIAGNOSIS — Z17.0 MALIGNANT NEOPLASM OF OVERLAPPING SITES OF RIGHT BREAST IN FEMALE, ESTROGEN RECEPTOR POSITIVE (HCC): Primary | ICD-10-CM

## 2023-04-05 PROCEDURE — 6360000002 HC RX W HCPCS: Performed by: NURSE PRACTITIONER

## 2023-04-05 PROCEDURE — 96372 THER/PROPH/DIAG INJ SC/IM: CPT

## 2023-04-05 RX ADMIN — PEGFILGRASTIM 6 MG: 6 INJECTION SUBCUTANEOUS at 14:59

## 2023-04-05 NOTE — PROGRESS NOTES
Arrived to the [facility]. Richelle completed. Patient tolerated well. Any issues or concerns during appointment: none. Patient aware of next lab and [facility] office visit on 4/24/23 (date) at 80 (time). Patient instructed to call provider with temperature of 100.4 or greater or nausea/vomiting/ diarrhea or pain not controlled by medications  Discharged ambulatory .

## 2023-04-24 ENCOUNTER — HOSPITAL ENCOUNTER (OUTPATIENT)
Dept: LAB | Age: 68
Discharge: HOME | End: 2023-04-27
Payer: MEDICARE

## 2023-04-24 ENCOUNTER — OFFICE VISIT (OUTPATIENT)
Dept: ONCOLOGY | Age: 68
End: 2023-04-24
Payer: MEDICARE

## 2023-04-24 VITALS
BODY MASS INDEX: 31.06 KG/M2 | TEMPERATURE: 98.3 F | HEART RATE: 83 BPM | OXYGEN SATURATION: 95 % | RESPIRATION RATE: 16 BRPM | SYSTOLIC BLOOD PRESSURE: 117 MMHG | HEIGHT: 62 IN | DIASTOLIC BLOOD PRESSURE: 75 MMHG | WEIGHT: 168.8 LBS

## 2023-04-24 DIAGNOSIS — C50.811 MALIGNANT NEOPLASM OF OVERLAPPING SITES OF RIGHT BREAST IN FEMALE, ESTROGEN RECEPTOR POSITIVE (HCC): ICD-10-CM

## 2023-04-24 DIAGNOSIS — C50.811 MALIGNANT NEOPLASM OF OVERLAPPING SITES OF RIGHT BREAST IN FEMALE, ESTROGEN RECEPTOR POSITIVE (HCC): Primary | ICD-10-CM

## 2023-04-24 DIAGNOSIS — Z17.0 MALIGNANT NEOPLASM OF OVERLAPPING SITES OF RIGHT BREAST IN FEMALE, ESTROGEN RECEPTOR POSITIVE (HCC): Primary | ICD-10-CM

## 2023-04-24 DIAGNOSIS — Z17.0 MALIGNANT NEOPLASM OF OVERLAPPING SITES OF RIGHT BREAST IN FEMALE, ESTROGEN RECEPTOR POSITIVE (HCC): ICD-10-CM

## 2023-04-24 LAB
ALBUMIN SERPL-MCNC: 3.1 G/DL (ref 3.2–4.6)
ALBUMIN/GLOB SERPL: 0.9 (ref 0.4–1.6)
ALP SERPL-CCNC: 68 U/L (ref 50–136)
ALT SERPL-CCNC: 23 U/L (ref 12–65)
ANION GAP SERPL CALC-SCNC: 8 MMOL/L (ref 2–11)
AST SERPL-CCNC: 21 U/L (ref 15–37)
BASOPHILS # BLD: 0 K/UL (ref 0–0.2)
BASOPHILS NFR BLD: 1 % (ref 0–2)
BILIRUB SERPL-MCNC: 0.4 MG/DL (ref 0.2–1.1)
BUN SERPL-MCNC: 11 MG/DL (ref 8–23)
CALCIUM SERPL-MCNC: 8.8 MG/DL (ref 8.3–10.4)
CHLORIDE SERPL-SCNC: 111 MMOL/L (ref 101–110)
CO2 SERPL-SCNC: 24 MMOL/L (ref 21–32)
CREAT SERPL-MCNC: 0.9 MG/DL (ref 0.6–1)
DIFFERENTIAL METHOD BLD: ABNORMAL
EOSINOPHIL # BLD: 0 K/UL (ref 0–0.8)
EOSINOPHIL NFR BLD: 0 % (ref 0.5–7.8)
ERYTHROCYTE [DISTWIDTH] IN BLOOD BY AUTOMATED COUNT: 19.9 % (ref 11.9–14.6)
GLOBULIN SER CALC-MCNC: 3.4 G/DL (ref 2.8–4.5)
GLUCOSE SERPL-MCNC: 101 MG/DL (ref 65–100)
HCT VFR BLD AUTO: 35.3 % (ref 35.8–46.3)
HGB BLD-MCNC: 11 G/DL (ref 11.7–15.4)
IMM GRANULOCYTES # BLD AUTO: 0 K/UL (ref 0–0.5)
IMM GRANULOCYTES NFR BLD AUTO: 1 % (ref 0–5)
LYMPHOCYTES # BLD: 1.3 K/UL (ref 0.5–4.6)
LYMPHOCYTES NFR BLD: 24 % (ref 13–44)
MCH RBC QN AUTO: 29 PG (ref 26.1–32.9)
MCHC RBC AUTO-ENTMCNC: 31.2 G/DL (ref 31.4–35)
MCV RBC AUTO: 93.1 FL (ref 82–102)
MONOCYTES # BLD: 0.8 K/UL (ref 0.1–1.3)
MONOCYTES NFR BLD: 15 % (ref 4–12)
NEUTS SEG # BLD: 3.3 K/UL (ref 1.7–8.2)
NEUTS SEG NFR BLD: 60 % (ref 43–78)
NRBC # BLD: 0 K/UL (ref 0–0.2)
PLATELET # BLD AUTO: 354 K/UL (ref 150–450)
PMV BLD AUTO: 8.9 FL (ref 9.4–12.3)
POTASSIUM SERPL-SCNC: 4.3 MMOL/L (ref 3.5–5.1)
PROT SERPL-MCNC: 6.5 G/DL (ref 6.3–8.2)
RBC # BLD AUTO: 3.79 M/UL (ref 4.05–5.2)
SODIUM SERPL-SCNC: 143 MMOL/L (ref 133–143)
WBC # BLD AUTO: 5.5 K/UL (ref 4.3–11.1)

## 2023-04-24 PROCEDURE — 80053 COMPREHEN METABOLIC PANEL: CPT

## 2023-04-24 PROCEDURE — G8428 CUR MEDS NOT DOCUMENT: HCPCS | Performed by: INTERNAL MEDICINE

## 2023-04-24 PROCEDURE — 3017F COLORECTAL CA SCREEN DOC REV: CPT | Performed by: INTERNAL MEDICINE

## 2023-04-24 PROCEDURE — 3074F SYST BP LT 130 MM HG: CPT | Performed by: INTERNAL MEDICINE

## 2023-04-24 PROCEDURE — 1123F ACP DISCUSS/DSCN MKR DOCD: CPT | Performed by: INTERNAL MEDICINE

## 2023-04-24 PROCEDURE — 3078F DIAST BP <80 MM HG: CPT | Performed by: INTERNAL MEDICINE

## 2023-04-24 PROCEDURE — 36415 COLL VENOUS BLD VENIPUNCTURE: CPT

## 2023-04-24 PROCEDURE — 1036F TOBACCO NON-USER: CPT | Performed by: INTERNAL MEDICINE

## 2023-04-24 PROCEDURE — 85025 COMPLETE CBC W/AUTO DIFF WBC: CPT

## 2023-04-24 PROCEDURE — G8399 PT W/DXA RESULTS DOCUMENT: HCPCS | Performed by: INTERNAL MEDICINE

## 2023-04-24 PROCEDURE — G8417 CALC BMI ABV UP PARAM F/U: HCPCS | Performed by: INTERNAL MEDICINE

## 2023-04-24 PROCEDURE — 1090F PRES/ABSN URINE INCON ASSESS: CPT | Performed by: INTERNAL MEDICINE

## 2023-04-24 PROCEDURE — 99214 OFFICE O/P EST MOD 30 MIN: CPT | Performed by: INTERNAL MEDICINE

## 2023-04-24 RX ORDER — ANASTROZOLE 1 MG/1
1 TABLET ORAL DAILY
Qty: 30 TABLET | Refills: 3 | Status: SHIPPED | OUTPATIENT
Start: 2023-04-24

## 2023-04-24 ASSESSMENT — PATIENT HEALTH QUESTIONNAIRE - PHQ9
1. LITTLE INTEREST OR PLEASURE IN DOING THINGS: 0
SUM OF ALL RESPONSES TO PHQ QUESTIONS 1-9: 0
2. FEELING DOWN, DEPRESSED OR HOPELESS: 0
SUM OF ALL RESPONSES TO PHQ9 QUESTIONS 1 & 2: 0

## 2023-04-24 NOTE — PATIENT INSTRUCTIONS
Patient Instructions from Today's Visit    Reason for Visit:  Follow up-Breast    Diagnosis Information:  [email or web address]-answers-patient-education-materials/[email or web address]-rqft-sheets      Plan: We will send in a script for the Arimidex.     Follow Up:  NP in 3 months  Dr Acosta Labs in 6 months  Recent Lab Results:  Hospital Outpatient Visit on 04/24/2023   Component Date Value Ref Range Status    WBC 04/24/2023 5.5  4.3 - 11.1 K/uL Final    RBC 04/24/2023 3.79 (L)  4.05 - 5.2 M/uL Final    Hemoglobin 04/24/2023 11.0 (L)  11.7 - 15.4 g/dL Final    Hematocrit 04/24/2023 35.3 (L)  35.8 - 46.3 % Final    MCV 04/24/2023 93.1  82.0 - 102.0 FL Final    MCH 04/24/2023 29.0  26.1 - 32.9 PG Final    MCHC 04/24/2023 31.2 (L)  31.4 - 35.0 g/dL Final    RDW 04/24/2023 19.9 (H)  11.9 - 14.6 % Final    Platelets 17/76/3110 354  150 - 450 K/uL Final    MPV 04/24/2023 8.9 (L)  9.4 - 12.3 FL Final    nRBC 04/24/2023 0.00  0.0 - 0.2 K/uL Final    **Note: Absolute NRBC parameter is now reported with Hemogram**    Differential Type 04/24/2023 AUTOMATED    Final    Seg Neutrophils 04/24/2023 60  43 - 78 % Final    Lymphocytes 04/24/2023 24  13 - 44 % Final    Monocytes 04/24/2023 15 (H)  4.0 - 12.0 % Final    Eosinophils % 04/24/2023 0 (L)  0.5 - 7.8 % Final    Basophils 04/24/2023 1  0.0 - 2.0 % Final    Immature Granulocytes 04/24/2023 1  0.0 - 5.0 % Final    Segs Absolute 04/24/2023 3.3  1.7 - 8.2 K/UL Final    Absolute Lymph # 04/24/2023 1.3  0.5 - 4.6 K/UL Final    Absolute Mono # 04/24/2023 0.8  0.1 - 1.3 K/UL Final    Absolute Eos # 04/24/2023 0.0  0.0 - 0.8 K/UL Final    Basophils Absolute 04/24/2023 0.0  0.0 - 0.2 K/UL Final    Absolute Immature Granulocyte 04/24/2023 0.0  0.0 - 0.5 K/UL Final    Sodium 04/24/2023 143  133 - 143 mmol/L Final    Potassium 04/24/2023 4.3  3.5 - 5.1 mmol/L Final    Chloride 04/24/2023 111 (H)  101 - 110 mmol/L Final    CO2 04/24/2023 24  21 - 32 mmol/L Final    Anion Gap 04/24/2023

## 2023-04-24 NOTE — PROGRESS NOTES
INTERNAL   CONTROLS:     ESTROGEN RECEPTOR:               Positive (98%)               Present,   positive   PROGESTERONE RECEPTOR:          Positive (92%)               Present,   positive   HER-2/SHARDA:                         Negative (0)               Percentage   of Cells with Uniform        Intense Complete Membrane   Stainin%                   ASSESSMENT:   Diagnosis Orders   1. Malignant neoplasm of overlapping sites of right breast in female, estrogen receptor positive [facility])                Patient Active Problem List   Diagnosis    Elevated liver function tests    Dyslipidemia    Weight gain    Renal insufficiency    Malignant neoplasm of overlapping sites of right breast in female, estrogen receptor positive ([facility] [address]    Primary hypertension    Ductal carcinoma in situ of right breast    Breast cancer in female [facility])    Neutropenic fever ([facility] [address]    Thrush           PLAN:  Lab studies were personally reviewed. Breast cancer: 3cm by ultrasound but up to 6.4 cm on MRI (including two tiny satellite nodules), high grade, ER/IA strongly positive, HER2 0. Upfront mastectomy shows the tumor to be 4.2 cm with 3 negative sentinel nodes, pT2pN0. Because the tumor was under 4.5 cm we recommended Oncotype Dx, which returned at 25. Although this was technically in the range included in [address], her clinicopathologic features including the size and grade are concerning. Indeed, when entered into [facility], her risk of recurrence is 35% with a reduction from chemotherapy of 14%. This is well above the threshold for appropriateness of chemotherapy, I would recommend TC for 4 cycles. This would be followed by adjuvant endocrine therapy, she does not require radiation. She returns today for follow up after cycle 4 TC. She is doing well overall. She did not recovery as quickly after cycle 3 and 4, but still feels OK, ECOG 1. Fatigue ongoing. She is still able to work part time.   No GI symptoms (other than taste

## 2023-07-24 ENCOUNTER — HOSPITAL ENCOUNTER (OUTPATIENT)
Dept: LAB | Age: 68
Discharge: HOME | End: 2023-07-27
Payer: MEDICARE

## 2023-07-24 ENCOUNTER — OFFICE VISIT (OUTPATIENT)
Dept: ONCOLOGY | Age: 68
End: 2023-07-24
Payer: MEDICARE

## 2023-07-24 VITALS
HEART RATE: 71 BPM | DIASTOLIC BLOOD PRESSURE: 76 MMHG | WEIGHT: 168.8 LBS | BODY MASS INDEX: 31.06 KG/M2 | TEMPERATURE: 98.7 F | OXYGEN SATURATION: 98 % | RESPIRATION RATE: 14 BRPM | HEIGHT: 62 IN | SYSTOLIC BLOOD PRESSURE: 117 MMHG

## 2023-07-24 DIAGNOSIS — R53.83 FATIGUE DUE TO TREATMENT: ICD-10-CM

## 2023-07-24 DIAGNOSIS — C50.811 MALIGNANT NEOPLASM OF OVERLAPPING SITES OF RIGHT BREAST IN FEMALE, ESTROGEN RECEPTOR POSITIVE (HCC): ICD-10-CM

## 2023-07-24 DIAGNOSIS — C50.811 MALIGNANT NEOPLASM OF OVERLAPPING SITES OF RIGHT BREAST IN FEMALE, ESTROGEN RECEPTOR POSITIVE (HCC): Primary | ICD-10-CM

## 2023-07-24 DIAGNOSIS — Z17.0 MALIGNANT NEOPLASM OF OVERLAPPING SITES OF RIGHT BREAST IN FEMALE, ESTROGEN RECEPTOR POSITIVE (HCC): Primary | ICD-10-CM

## 2023-07-24 DIAGNOSIS — Z17.0 MALIGNANT NEOPLASM OF OVERLAPPING SITES OF RIGHT BREAST IN FEMALE, ESTROGEN RECEPTOR POSITIVE (HCC): ICD-10-CM

## 2023-07-24 DIAGNOSIS — Z12.31 SCREENING MAMMOGRAM FOR BREAST CANCER: ICD-10-CM

## 2023-07-24 DIAGNOSIS — Z79.811 AROMATASE INHIBITOR USE: ICD-10-CM

## 2023-07-24 LAB
ALBUMIN SERPL-MCNC: 3.9 G/DL (ref 3.2–4.6)
ALBUMIN/GLOB SERPL: 1.3 (ref 0.4–1.6)
ALP SERPL-CCNC: 66 U/L (ref 50–136)
ALT SERPL-CCNC: 35 U/L (ref 12–65)
ANION GAP SERPL CALC-SCNC: 4 MMOL/L (ref 2–11)
AST SERPL-CCNC: 21 U/L (ref 15–37)
BASOPHILS # BLD: 0 K/UL (ref 0–0.2)
BASOPHILS NFR BLD: 0 % (ref 0–2)
BILIRUB SERPL-MCNC: 0.5 MG/DL (ref 0.2–1.1)
BUN SERPL-MCNC: 17 MG/DL (ref 8–23)
CALCIUM SERPL-MCNC: 9.4 MG/DL (ref 8.3–10.4)
CHLORIDE SERPL-SCNC: 107 MMOL/L (ref 101–110)
CO2 SERPL-SCNC: 30 MMOL/L (ref 21–32)
CREAT SERPL-MCNC: 1 MG/DL (ref 0.6–1)
DIFFERENTIAL METHOD BLD: ABNORMAL
EOSINOPHIL # BLD: 0.1 K/UL (ref 0–0.8)
EOSINOPHIL NFR BLD: 2 % (ref 0.5–7.8)
ERYTHROCYTE [DISTWIDTH] IN BLOOD BY AUTOMATED COUNT: 13.7 % (ref 11.9–14.6)
GLOBULIN SER CALC-MCNC: 3.1 G/DL (ref 2.8–4.5)
GLUCOSE SERPL-MCNC: 114 MG/DL (ref 65–100)
HCT VFR BLD AUTO: 45.2 % (ref 35.8–46.3)
HGB BLD-MCNC: 14.4 G/DL (ref 11.7–15.4)
IMM GRANULOCYTES # BLD AUTO: 0 K/UL (ref 0–0.5)
IMM GRANULOCYTES NFR BLD AUTO: 0 % (ref 0–5)
LYMPHOCYTES # BLD: 1.8 K/UL (ref 0.5–4.6)
LYMPHOCYTES NFR BLD: 36 % (ref 13–44)
MCH RBC QN AUTO: 27.1 PG (ref 26.1–32.9)
MCHC RBC AUTO-ENTMCNC: 31.9 G/DL (ref 31.4–35)
MCV RBC AUTO: 85 FL (ref 82–102)
MONOCYTES # BLD: 0.6 K/UL (ref 0.1–1.3)
MONOCYTES NFR BLD: 12 % (ref 4–12)
NEUTS SEG # BLD: 2.4 K/UL (ref 1.7–8.2)
NEUTS SEG NFR BLD: 50 % (ref 43–78)
NRBC # BLD: 0 K/UL (ref 0–0.2)
PLATELET # BLD AUTO: 241 K/UL (ref 150–450)
PMV BLD AUTO: 8.9 FL (ref 9.4–12.3)
POTASSIUM SERPL-SCNC: 4.4 MMOL/L (ref 3.5–5.1)
PROT SERPL-MCNC: 7 G/DL (ref 6.3–8.2)
RBC # BLD AUTO: 5.32 M/UL (ref 4.05–5.2)
SODIUM SERPL-SCNC: 141 MMOL/L (ref 133–143)
WBC # BLD AUTO: 4.8 K/UL (ref 4.3–11.1)

## 2023-07-24 PROCEDURE — 1123F ACP DISCUSS/DSCN MKR DOCD: CPT | Performed by: NURSE PRACTITIONER

## 2023-07-24 PROCEDURE — G8399 PT W/DXA RESULTS DOCUMENT: HCPCS | Performed by: NURSE PRACTITIONER

## 2023-07-24 PROCEDURE — 85025 COMPLETE CBC W/AUTO DIFF WBC: CPT

## 2023-07-24 PROCEDURE — 3074F SYST BP LT 130 MM HG: CPT | Performed by: NURSE PRACTITIONER

## 2023-07-24 PROCEDURE — G8427 DOCREV CUR MEDS BY ELIG CLIN: HCPCS | Performed by: NURSE PRACTITIONER

## 2023-07-24 PROCEDURE — 1036F TOBACCO NON-USER: CPT | Performed by: NURSE PRACTITIONER

## 2023-07-24 PROCEDURE — 3078F DIAST BP <80 MM HG: CPT | Performed by: NURSE PRACTITIONER

## 2023-07-24 PROCEDURE — 80053 COMPREHEN METABOLIC PANEL: CPT

## 2023-07-24 PROCEDURE — 99214 OFFICE O/P EST MOD 30 MIN: CPT | Performed by: NURSE PRACTITIONER

## 2023-07-24 PROCEDURE — 36415 COLL VENOUS BLD VENIPUNCTURE: CPT

## 2023-07-24 PROCEDURE — G8417 CALC BMI ABV UP PARAM F/U: HCPCS | Performed by: NURSE PRACTITIONER

## 2023-07-24 PROCEDURE — 3017F COLORECTAL CA SCREEN DOC REV: CPT | Performed by: NURSE PRACTITIONER

## 2023-07-24 PROCEDURE — 1090F PRES/ABSN URINE INCON ASSESS: CPT | Performed by: NURSE PRACTITIONER

## 2023-07-24 RX ORDER — ANASTROZOLE 1 MG/1
1 TABLET ORAL DAILY
Qty: 90 TABLET | Refills: 3 | Status: SHIPPED | OUTPATIENT
Start: 2023-07-24

## 2023-07-24 ASSESSMENT — PATIENT HEALTH QUESTIONNAIRE - PHQ9
SUM OF ALL RESPONSES TO PHQ QUESTIONS 1-9: 0
SUM OF ALL RESPONSES TO PHQ9 QUESTIONS 1 & 2: 0
2. FEELING DOWN, DEPRESSED OR HOPELESS: 0
1. LITTLE INTEREST OR PLEASURE IN DOING THINGS: 0

## 2023-07-24 NOTE — PROGRESS NOTES
Procedures/Addenda                     STF- ER/AK/[id number] BY IHC                                                                                                                                         Status:  Signed Out    Colonel Tung MD on 10/10/2022                                 Interpretation                       [employer] IHC Quantitative Breast Panel Result: A1     TEST NAME:                         RESULTS:               INTERNAL   CONTROLS:     ESTROGEN RECEPTOR:               Positive (98%)               Present,   positive   PROGESTERONE RECEPTOR:          Positive (92%)               Present,   positive   HER-2/SHARDA:                         Negative (0)               Percentage   of Cells with Uniform        Intense Complete Membrane   Stainin%                   ASSESSMENT:   Diagnosis Orders   1. Malignant neoplasm of overlapping sites of right breast in female, estrogen receptor positive ([address])  anastrozole (ARIMIDEX) 1 MG tablet    ZIA ROSSY DIGITAL SCREEN UNI LEFT      2. Fatigue due to treatment        3. Aromatase inhibitor use  anastrozole (ARIMIDEX) 1 MG tablet      4. Screening mammogram for breast cancer  ZIA ROSSY DIGITAL SCREEN UNI LEFT                Patient Active Problem List   Diagnosis    Elevated liver function tests    Dyslipidemia    Weight gain    Renal insufficiency    Malignant neoplasm of overlapping sites of right breast in female, estrogen receptor positive ([address])    Primary hypertension    Ductal carcinoma in situ of right breast    Breast cancer in female [facility])    Neutropenic fever ([address])    Thrush       PLAN:  Lab studies were personally reviewed. Breast cancer: 3cm by ultrasound but up to 6.4 cm on MRI (including two tiny satellite nodules), high grade, ER/AK strongly positive, HER2 0. Upfront mastectomy shows the tumor to be 4.2 cm with 3 negative sentinel nodes, pT2pN0.   Because the tumor was under 4.5 cm we recommended Oncotype Dx, which returned at

## 2023-07-27 ASSESSMENT — ENCOUNTER SYMPTOMS
SHORTNESS OF BREATH: 0
BLURRED VISION: 0
NAUSEA: 0
ORTHOPNEA: 0
VOMITING: 0
COUGH: 0
ABDOMINAL PAIN: 0

## 2023-07-28 ENCOUNTER — OFFICE VISIT (OUTPATIENT)
Dept: FAMILY MEDICINE CLINIC | Facility: CLINIC | Age: 68
End: 2023-07-28
Payer: MEDICARE

## 2023-07-28 VITALS
HEART RATE: 76 BPM | OXYGEN SATURATION: 97 % | BODY MASS INDEX: 31.1 KG/M2 | RESPIRATION RATE: 16 BRPM | SYSTOLIC BLOOD PRESSURE: 125 MMHG | DIASTOLIC BLOOD PRESSURE: 76 MMHG | TEMPERATURE: 97.3 F | HEIGHT: 62 IN | WEIGHT: 169 LBS

## 2023-07-28 DIAGNOSIS — Z00.00 MEDICARE ANNUAL WELLNESS VISIT, SUBSEQUENT: Primary | ICD-10-CM

## 2023-07-28 DIAGNOSIS — Z23 NEED FOR SHINGLES VACCINE: ICD-10-CM

## 2023-07-28 DIAGNOSIS — R11.0 NAUSEA: ICD-10-CM

## 2023-07-28 DIAGNOSIS — I10 PRIMARY HYPERTENSION: ICD-10-CM

## 2023-07-28 DIAGNOSIS — E78.5 DYSLIPIDEMIA: ICD-10-CM

## 2023-07-28 DIAGNOSIS — Z17.0 MALIGNANT NEOPLASM OF OVERLAPPING SITES OF RIGHT BREAST IN FEMALE, ESTROGEN RECEPTOR POSITIVE (HCC): ICD-10-CM

## 2023-07-28 DIAGNOSIS — C50.811 MALIGNANT NEOPLASM OF OVERLAPPING SITES OF RIGHT BREAST IN FEMALE, ESTROGEN RECEPTOR POSITIVE (HCC): ICD-10-CM

## 2023-07-28 LAB
CREAT UR-MCNC: 83 MG/DL
MICROALBUMIN UR-MCNC: 0.69 MG/DL
MICROALBUMIN/CREAT UR-RTO: 8 MG/G (ref 0–30)

## 2023-07-28 PROCEDURE — G0439 PPPS, SUBSEQ VISIT: HCPCS | Performed by: FAMILY MEDICINE

## 2023-07-28 PROCEDURE — 3078F DIAST BP <80 MM HG: CPT | Performed by: FAMILY MEDICINE

## 2023-07-28 PROCEDURE — 3017F COLORECTAL CA SCREEN DOC REV: CPT | Performed by: FAMILY MEDICINE

## 2023-07-28 PROCEDURE — 3074F SYST BP LT 130 MM HG: CPT | Performed by: FAMILY MEDICINE

## 2023-07-28 PROCEDURE — 1123F ACP DISCUSS/DSCN MKR DOCD: CPT | Performed by: FAMILY MEDICINE

## 2023-07-28 RX ORDER — ZOSTER VACCINE RECOMBINANT, ADJUVANTED 50 MCG/0.5
0.5 KIT INTRAMUSCULAR SEE ADMIN INSTRUCTIONS
Qty: 0.5 ML | Refills: 1 | Status: SHIPPED | OUTPATIENT
Start: 2023-07-28 | End: 2024-01-24

## 2023-07-28 RX ORDER — LISINOPRIL 10 MG/1
10 TABLET ORAL DAILY
Qty: 90 TABLET | Refills: 1 | Status: SHIPPED | OUTPATIENT
Start: 2023-07-28

## 2023-07-28 RX ORDER — ROSUVASTATIN CALCIUM 20 MG/1
20 TABLET, COATED ORAL NIGHTLY
Qty: 90 TABLET | Refills: 3 | Status: SHIPPED | OUTPATIENT
Start: 2023-07-28

## 2023-07-28 ASSESSMENT — PATIENT HEALTH QUESTIONNAIRE - PHQ9
SUM OF ALL RESPONSES TO PHQ QUESTIONS 1-9: 0
2. FEELING DOWN, DEPRESSED OR HOPELESS: 0
1. LITTLE INTEREST OR PLEASURE IN DOING THINGS: 0
SUM OF ALL RESPONSES TO PHQ9 QUESTIONS 1 & 2: 0
SUM OF ALL RESPONSES TO PHQ QUESTIONS 1-9: 0

## 2023-07-28 ASSESSMENT — ENCOUNTER SYMPTOMS
CONSTIPATION: 0
DIARRHEA: 0
RHINORRHEA: 0
WHEEZING: 0

## 2023-07-28 ASSESSMENT — LIFESTYLE VARIABLES
HOW MANY STANDARD DRINKS CONTAINING ALCOHOL DO YOU HAVE ON A TYPICAL DAY: PATIENT DOES NOT DRINK
HOW OFTEN DO YOU HAVE A DRINK CONTAINING ALCOHOL: NEVER

## 2023-10-23 ENCOUNTER — HOSPITAL ENCOUNTER (OUTPATIENT)
Dept: MAMMOGRAPHY | Age: 68
Discharge: HOME | End: 2023-10-26
Payer: MEDICARE

## 2023-10-23 VITALS — BODY MASS INDEX: 30.91 KG/M2 | HEIGHT: 62 IN | WEIGHT: 168 LBS

## 2023-10-23 DIAGNOSIS — Z12.31 SCREENING MAMMOGRAM FOR BREAST CANCER: ICD-10-CM

## 2023-10-23 DIAGNOSIS — C50.811 MALIGNANT NEOPLASM OF OVERLAPPING SITES OF RIGHT BREAST IN FEMALE, ESTROGEN RECEPTOR POSITIVE (HCC): ICD-10-CM

## 2023-10-23 DIAGNOSIS — Z17.0 MALIGNANT NEOPLASM OF OVERLAPPING SITES OF RIGHT BREAST IN FEMALE, ESTROGEN RECEPTOR POSITIVE (HCC): ICD-10-CM

## 2023-10-23 PROCEDURE — 77063 BREAST TOMOSYNTHESIS BI: CPT

## 2023-11-21 ENCOUNTER — OFFICE VISIT (OUTPATIENT)
Dept: ONCOLOGY | Age: 68
End: 2023-11-21
Payer: MEDICARE

## 2023-11-21 ENCOUNTER — HOSPITAL ENCOUNTER (OUTPATIENT)
Dept: LAB | Age: 68
Discharge: HOME | End: 2023-11-24
Payer: MEDICARE

## 2023-11-21 VITALS
SYSTOLIC BLOOD PRESSURE: 124 MMHG | WEIGHT: 171 LBS | HEIGHT: 62 IN | BODY MASS INDEX: 31.47 KG/M2 | DIASTOLIC BLOOD PRESSURE: 78 MMHG | HEART RATE: 90 BPM | OXYGEN SATURATION: 95 % | RESPIRATION RATE: 16 BRPM | TEMPERATURE: 97.6 F

## 2023-11-21 DIAGNOSIS — Z17.0 MALIGNANT NEOPLASM OF OVERLAPPING SITES OF RIGHT BREAST IN FEMALE, ESTROGEN RECEPTOR POSITIVE (HCC): ICD-10-CM

## 2023-11-21 DIAGNOSIS — Z79.811 AROMATASE INHIBITOR USE: ICD-10-CM

## 2023-11-21 DIAGNOSIS — Z17.0 MALIGNANT NEOPLASM OF OVERLAPPING SITES OF RIGHT BREAST IN FEMALE, ESTROGEN RECEPTOR POSITIVE (HCC): Primary | ICD-10-CM

## 2023-11-21 DIAGNOSIS — C50.811 MALIGNANT NEOPLASM OF OVERLAPPING SITES OF RIGHT BREAST IN FEMALE, ESTROGEN RECEPTOR POSITIVE (HCC): ICD-10-CM

## 2023-11-21 DIAGNOSIS — C50.811 MALIGNANT NEOPLASM OF OVERLAPPING SITES OF RIGHT BREAST IN FEMALE, ESTROGEN RECEPTOR POSITIVE (HCC): Primary | ICD-10-CM

## 2023-11-21 LAB
ALBUMIN SERPL-MCNC: 3.9 G/DL (ref 3.2–4.6)
ALBUMIN/GLOB SERPL: 1.1 (ref 0.4–1.6)
ALP SERPL-CCNC: 73 U/L (ref 50–136)
ALT SERPL-CCNC: 38 U/L (ref 12–65)
ANION GAP SERPL CALC-SCNC: 3 MMOL/L (ref 2–11)
AST SERPL-CCNC: 24 U/L (ref 15–37)
BASOPHILS # BLD: 0 K/UL (ref 0–0.2)
BASOPHILS NFR BLD: 1 % (ref 0–2)
BILIRUB SERPL-MCNC: 0.4 MG/DL (ref 0.2–1.1)
BUN SERPL-MCNC: 14 MG/DL (ref 8–23)
CALCIUM SERPL-MCNC: 9.3 MG/DL (ref 8.3–10.4)
CHLORIDE SERPL-SCNC: 107 MMOL/L (ref 101–110)
CO2 SERPL-SCNC: 32 MMOL/L (ref 21–32)
CREAT SERPL-MCNC: 1 MG/DL (ref 0.6–1)
DIFFERENTIAL METHOD BLD: ABNORMAL
EOSINOPHIL # BLD: 0.1 K/UL (ref 0–0.8)
EOSINOPHIL NFR BLD: 2 % (ref 0.5–7.8)
ERYTHROCYTE [DISTWIDTH] IN BLOOD BY AUTOMATED COUNT: 13.3 % (ref 11.9–14.6)
GLOBULIN SER CALC-MCNC: 3.5 G/DL (ref 2.8–4.5)
GLUCOSE SERPL-MCNC: 99 MG/DL (ref 65–100)
HCT VFR BLD AUTO: 44.5 % (ref 35.8–46.3)
HGB BLD-MCNC: 14.4 G/DL (ref 11.7–15.4)
IMM GRANULOCYTES # BLD AUTO: 0 K/UL (ref 0–0.5)
IMM GRANULOCYTES NFR BLD AUTO: 0 % (ref 0–5)
LYMPHOCYTES # BLD: 1.8 K/UL (ref 0.5–4.6)
LYMPHOCYTES NFR BLD: 32 % (ref 13–44)
MCH RBC QN AUTO: 28.3 PG (ref 26.1–32.9)
MCHC RBC AUTO-ENTMCNC: 32.4 G/DL (ref 31.4–35)
MCV RBC AUTO: 87.4 FL (ref 82–102)
MONOCYTES # BLD: 0.7 K/UL (ref 0.1–1.3)
MONOCYTES NFR BLD: 12 % (ref 4–12)
NEUTS SEG # BLD: 3 K/UL (ref 1.7–8.2)
NEUTS SEG NFR BLD: 53 % (ref 43–78)
NRBC # BLD: 0 K/UL (ref 0–0.2)
PLATELET # BLD AUTO: 247 K/UL (ref 150–450)
PMV BLD AUTO: 8.9 FL (ref 9.4–12.3)
POTASSIUM SERPL-SCNC: 4 MMOL/L (ref 3.5–5.1)
PROT SERPL-MCNC: 7.4 G/DL (ref 6.3–8.2)
RBC # BLD AUTO: 5.09 M/UL (ref 4.05–5.2)
SODIUM SERPL-SCNC: 142 MMOL/L (ref 133–143)
WBC # BLD AUTO: 5.7 K/UL (ref 4.3–11.1)

## 2023-11-21 PROCEDURE — G8399 PT W/DXA RESULTS DOCUMENT: HCPCS | Performed by: INTERNAL MEDICINE

## 2023-11-21 PROCEDURE — 85025 COMPLETE CBC W/AUTO DIFF WBC: CPT

## 2023-11-21 PROCEDURE — 3078F DIAST BP <80 MM HG: CPT | Performed by: INTERNAL MEDICINE

## 2023-11-21 PROCEDURE — 3074F SYST BP LT 130 MM HG: CPT | Performed by: INTERNAL MEDICINE

## 2023-11-21 PROCEDURE — 80053 COMPREHEN METABOLIC PANEL: CPT

## 2023-11-21 PROCEDURE — 3017F COLORECTAL CA SCREEN DOC REV: CPT | Performed by: INTERNAL MEDICINE

## 2023-11-21 PROCEDURE — 36415 COLL VENOUS BLD VENIPUNCTURE: CPT

## 2023-11-21 PROCEDURE — 99214 OFFICE O/P EST MOD 30 MIN: CPT | Performed by: INTERNAL MEDICINE

## 2023-11-21 PROCEDURE — 1036F TOBACCO NON-USER: CPT | Performed by: INTERNAL MEDICINE

## 2023-11-21 PROCEDURE — G8484 FLU IMMUNIZE NO ADMIN: HCPCS | Performed by: INTERNAL MEDICINE

## 2023-11-21 PROCEDURE — 1090F PRES/ABSN URINE INCON ASSESS: CPT | Performed by: INTERNAL MEDICINE

## 2023-11-21 PROCEDURE — G8417 CALC BMI ABV UP PARAM F/U: HCPCS | Performed by: INTERNAL MEDICINE

## 2023-11-21 PROCEDURE — 1123F ACP DISCUSS/DSCN MKR DOCD: CPT | Performed by: INTERNAL MEDICINE

## 2023-11-21 PROCEDURE — G8428 CUR MEDS NOT DOCUMENT: HCPCS | Performed by: INTERNAL MEDICINE

## 2023-11-21 ASSESSMENT — PATIENT HEALTH QUESTIONNAIRE - PHQ9
1. LITTLE INTEREST OR PLEASURE IN DOING THINGS: 0
SUM OF ALL RESPONSES TO PHQ QUESTIONS 1-9: 0
2. FEELING DOWN, DEPRESSED OR HOPELESS: 0
SUM OF ALL RESPONSES TO PHQ QUESTIONS 1-9: 0
SUM OF ALL RESPONSES TO PHQ9 QUESTIONS 1 & 2: 0

## 2023-11-21 NOTE — PATIENT INSTRUCTIONS
Patient Instructions from Today's Visit    Reason for Visit:  Follow up Breast Cancer     Diagnosis Information:  [email or web address]-[email or web address]-answers-patient-education-[email or web address]-[email or web address]-[email or web address]-sheets    Plan:  Lab results reviewed     Follow Up:   Follow up in 6 months     Recent Lab Results:  Hospital Outpatient Visit on 11/21/2023   Component Date Value Ref Range Status    WBC 11/21/2023 5.7  4.3 - 11.1 K/uL Final    RBC 11/21/2023 5.09  4.05 - 5.2 M/uL Final    Hemoglobin 11/21/2023 14.4  11.7 - 15.4 g/dL Final    Hematocrit 11/21/2023 44.5  35.8 - 46.3 % Final    MCV 11/21/2023 87.4  82.0 - 102.0 FL Final    MCH 11/21/2023 28.3  26.1 - 32.9 PG Final    MCHC 11/21/2023 32.4  31.4 - 35.0 g/dL Final    RDW 11/21/2023 13.3  11.9 - 14.6 % Final    Platelets 17/97/4363 247  150 - 450 K/uL Final    MPV 11/21/2023 8.9 (L)  9.4 - 12.3 FL Final    nRBC 11/21/2023 0.00  0.0 - 0.2 K/uL Final    **Note: Absolute NRBC parameter is now reported with Hemogram**    Neutrophils % 11/21/2023 53  43 - 78 % Final    Lymphocytes % 11/21/2023 32  13 - 44 % Final    Monocytes % 11/21/2023 12  4.0 - 12.0 % Final    Eosinophils % 11/21/2023 2  0.5 - 7.8 % Final    Basophils % 11/21/2023 1  0.0 - 2.0 % Final    Immature Granulocytes 11/21/2023 0  0.0 - 5.0 % Final    Neutrophils Absolute 11/21/2023 3.0  1.7 - 8.2 K/UL Final    Lymphocytes Absolute 11/21/2023 1.8  0.5 - 4.6 K/UL Final    Monocytes Absolute 11/21/2023 0.7  0.1 - 1.3 K/UL Final    Eosinophils Absolute 11/21/2023 0.1  0.0 - 0.8 K/UL Final    Basophils Absolute 11/21/2023 0.0  0.0 - 0.2 K/UL Final    Absolute Immature Granulocyte 11/21/2023 0.0  0.0 - 0.5 K/UL Final    Differential Type 11/21/2023 AUTOMATED    Final    Sodium 11/21/2023 142  133 - 143 mmol/L Final    Potassium 11/21/2023 4.0  3.5 - 5.1 mmol/L Final    Chloride 11/21/2023 107  101 - 110 mmol/L Final    CO2 11/21/2023 32  21 - 32 mmol/L Final    Anion Gap 11/21/2023 3  2 - 11 mmol/L Final    Glucose

## 2023-11-21 NOTE — PROGRESS NOTES
2022    Neutropenic fever ([address]) 2/6/2023    Renal insufficiency      Past Surgical History:   Procedure Laterality Date    BREAST BIOPSY Right 12/20/2022    RIGHT SENTINEL NODE BIOPSY  performed by Kirill Guevara MD at [address] [facility] Right 12/20/2022    RIGHT BREAST MASTECTOMY performed by Kirill Guevara MD at [address] [facility]      right lymph node excision - neck    US BREAST BIOPSY W LOC DEVICE 1ST LESION RIGHT Right 10/05/2022    US BREAST NEEDLE BIOPSY RIGHT 10/5/2022 Himanshu Ge MD [facility]     Family History   Problem Relation Age of Onset    Thyroid Disease Mother     Heart Disease Mother     High Blood Pressure Father     Heart Disease Father     Thyroid Disease Father     Cancer Sister 48        lung    Diabetes Neg Hx      Social History     Socioeconomic History    Marital status:      Spouse name: Not on file    Number of children: Not on file    Years of education: Not on file    Highest education level: Not on file   Occupational History    Not on file   Tobacco Use    Smoking status: Never    Smokeless tobacco: Never   Vaping Use    Vaping Use: Never used   Substance and Sexual Activity    Alcohol use: Not Currently     Comment: Socially    Drug use: Never    Sexual activity: Not on file   Other Topics Concern    Not on file   Social History Narrative    Not on file     Social Determinants of Health     Financial Resource Strain: Low Risk  (2/14/2023)    Overall Financial Resource Strain (CARDIA)     Difficulty of Paying Living Expenses: Not hard at all   Food Insecurity: No Food Insecurity (2/14/2023)    Hunger Vital Sign     Worried About Running Out of Food in the Last Year: Never true     Ran Out of Food in the Last Year: Never true   Transportation Needs: Unknown (2/14/2023)    PRAPARE - Transportation     Lack of Transportation (Medical): Not on file     Lack of Transportation (Non-Medical):  No   Physical Activity:

## 2024-04-19 DIAGNOSIS — I10 PRIMARY HYPERTENSION: ICD-10-CM

## 2024-04-19 RX ORDER — LISINOPRIL 10 MG/1
10 TABLET ORAL DAILY
Qty: 90 TABLET | Refills: 1 | Status: SHIPPED | OUTPATIENT
Start: 2024-04-19

## 2024-05-13 DIAGNOSIS — Z17.0 MALIGNANT NEOPLASM OF OVERLAPPING SITES OF RIGHT BREAST IN FEMALE, ESTROGEN RECEPTOR POSITIVE (HCC): ICD-10-CM

## 2024-05-13 DIAGNOSIS — C50.811 MALIGNANT NEOPLASM OF OVERLAPPING SITES OF RIGHT BREAST IN FEMALE, ESTROGEN RECEPTOR POSITIVE (HCC): ICD-10-CM

## 2024-05-13 DIAGNOSIS — Z79.811 AROMATASE INHIBITOR USE: Primary | ICD-10-CM

## 2024-05-21 ENCOUNTER — HOSPITAL ENCOUNTER (OUTPATIENT)
Dept: LAB | Age: 69
Discharge: HOME | End: 2024-05-24
Payer: MEDICARE

## 2024-05-21 ENCOUNTER — OFFICE VISIT (OUTPATIENT)
Dept: ONCOLOGY | Age: 69
End: 2024-05-21
Payer: MEDICARE

## 2024-05-21 VITALS
SYSTOLIC BLOOD PRESSURE: 127 MMHG | TEMPERATURE: 98.6 F | OXYGEN SATURATION: 96 % | BODY MASS INDEX: 32.76 KG/M2 | DIASTOLIC BLOOD PRESSURE: 71 MMHG | HEIGHT: 62 IN | RESPIRATION RATE: 22 BRPM | HEART RATE: 73 BPM | WEIGHT: 178 LBS

## 2024-05-21 DIAGNOSIS — Z17.0 MALIGNANT NEOPLASM OF OVERLAPPING SITES OF RIGHT BREAST IN FEMALE, ESTROGEN RECEPTOR POSITIVE (HCC): ICD-10-CM

## 2024-05-21 DIAGNOSIS — T45.1X5A HOT FLASHES RELATED TO AROMATASE INHIBITOR THERAPY: ICD-10-CM

## 2024-05-21 DIAGNOSIS — Z17.0 MALIGNANT NEOPLASM OF OVERLAPPING SITES OF RIGHT BREAST IN FEMALE, ESTROGEN RECEPTOR POSITIVE (HCC): Primary | ICD-10-CM

## 2024-05-21 DIAGNOSIS — R23.2 HOT FLASHES RELATED TO AROMATASE INHIBITOR THERAPY: ICD-10-CM

## 2024-05-21 DIAGNOSIS — C50.811 MALIGNANT NEOPLASM OF OVERLAPPING SITES OF RIGHT BREAST IN FEMALE, ESTROGEN RECEPTOR POSITIVE (HCC): ICD-10-CM

## 2024-05-21 DIAGNOSIS — Z12.31 SCREENING MAMMOGRAM FOR BREAST CANCER: ICD-10-CM

## 2024-05-21 DIAGNOSIS — Z79.811 AROMATASE INHIBITOR USE: ICD-10-CM

## 2024-05-21 DIAGNOSIS — C50.811 MALIGNANT NEOPLASM OF OVERLAPPING SITES OF RIGHT BREAST IN FEMALE, ESTROGEN RECEPTOR POSITIVE (HCC): Primary | ICD-10-CM

## 2024-05-21 LAB
ALBUMIN SERPL-MCNC: 3.8 G/DL (ref 3.2–4.6)
ALBUMIN/GLOB SERPL: 1.5 (ref 1–1.9)
ALP SERPL-CCNC: 66 U/L (ref 35–104)
ALT SERPL-CCNC: 33 U/L (ref 12–65)
ANION GAP SERPL CALC-SCNC: 11 MMOL/L (ref 9–18)
AST SERPL-CCNC: 28 U/L (ref 15–37)
BASOPHILS # BLD: 0 K/UL (ref 0–0.2)
BASOPHILS NFR BLD: 1 % (ref 0–2)
BILIRUB SERPL-MCNC: 0.4 MG/DL (ref 0–1.2)
BUN SERPL-MCNC: 11 MG/DL (ref 8–23)
CALCIUM SERPL-MCNC: 9.4 MG/DL (ref 8.8–10.2)
CHLORIDE SERPL-SCNC: 107 MMOL/L (ref 98–107)
CO2 SERPL-SCNC: 23 MMOL/L (ref 20–28)
CREAT SERPL-MCNC: 0.83 MG/DL (ref 0.6–1.1)
DIFFERENTIAL METHOD BLD: ABNORMAL
EOSINOPHIL # BLD: 0.1 K/UL (ref 0–0.8)
EOSINOPHIL NFR BLD: 2 % (ref 0.5–7.8)
ERYTHROCYTE [DISTWIDTH] IN BLOOD BY AUTOMATED COUNT: 13.6 % (ref 11.9–14.6)
GLOBULIN SER CALC-MCNC: 2.6 G/DL (ref 2.3–3.5)
GLUCOSE SERPL-MCNC: 125 MG/DL (ref 70–99)
HCT VFR BLD AUTO: 42.2 % (ref 35.8–46.3)
HGB BLD-MCNC: 13.9 G/DL (ref 11.7–15.4)
IMM GRANULOCYTES # BLD AUTO: 0 K/UL (ref 0–0.5)
IMM GRANULOCYTES NFR BLD AUTO: 0 % (ref 0–5)
LYMPHOCYTES # BLD: 2.1 K/UL (ref 0.5–4.6)
LYMPHOCYTES NFR BLD: 39 % (ref 13–44)
MCH RBC QN AUTO: 28.1 PG (ref 26.1–32.9)
MCHC RBC AUTO-ENTMCNC: 32.9 G/DL (ref 31.4–35)
MCV RBC AUTO: 85.3 FL (ref 82–102)
MONOCYTES # BLD: 0.6 K/UL (ref 0.1–1.3)
MONOCYTES NFR BLD: 12 % (ref 4–12)
NEUTS SEG # BLD: 2.5 K/UL (ref 1.7–8.2)
NEUTS SEG NFR BLD: 46 % (ref 43–78)
NRBC # BLD: 0 K/UL (ref 0–0.2)
PLATELET # BLD AUTO: 250 K/UL (ref 150–450)
PMV BLD AUTO: 9 FL (ref 9.4–12.3)
POTASSIUM SERPL-SCNC: 4.3 MMOL/L (ref 3.5–5.1)
PROT SERPL-MCNC: 6.4 G/DL (ref 6.3–8.2)
RBC # BLD AUTO: 4.95 M/UL (ref 4.05–5.2)
SODIUM SERPL-SCNC: 141 MMOL/L (ref 136–145)
WBC # BLD AUTO: 5.4 K/UL (ref 4.3–11.1)

## 2024-05-21 PROCEDURE — 3074F SYST BP LT 130 MM HG: CPT | Performed by: NURSE PRACTITIONER

## 2024-05-21 PROCEDURE — 80053 COMPREHEN METABOLIC PANEL: CPT

## 2024-05-21 PROCEDURE — G8427 DOCREV CUR MEDS BY ELIG CLIN: HCPCS | Performed by: NURSE PRACTITIONER

## 2024-05-21 PROCEDURE — 1123F ACP DISCUSS/DSCN MKR DOCD: CPT | Performed by: NURSE PRACTITIONER

## 2024-05-21 PROCEDURE — 99214 OFFICE O/P EST MOD 30 MIN: CPT | Performed by: NURSE PRACTITIONER

## 2024-05-21 PROCEDURE — 36415 COLL VENOUS BLD VENIPUNCTURE: CPT

## 2024-05-21 PROCEDURE — G8399 PT W/DXA RESULTS DOCUMENT: HCPCS | Performed by: NURSE PRACTITIONER

## 2024-05-21 PROCEDURE — 1090F PRES/ABSN URINE INCON ASSESS: CPT | Performed by: NURSE PRACTITIONER

## 2024-05-21 PROCEDURE — 3078F DIAST BP <80 MM HG: CPT | Performed by: NURSE PRACTITIONER

## 2024-05-21 PROCEDURE — 1036F TOBACCO NON-USER: CPT | Performed by: NURSE PRACTITIONER

## 2024-05-21 PROCEDURE — 3017F COLORECTAL CA SCREEN DOC REV: CPT | Performed by: NURSE PRACTITIONER

## 2024-05-21 PROCEDURE — 85025 COMPLETE CBC W/AUTO DIFF WBC: CPT

## 2024-05-21 PROCEDURE — G8417 CALC BMI ABV UP PARAM F/U: HCPCS | Performed by: NURSE PRACTITIONER

## 2024-05-21 RX ORDER — ANASTROZOLE 1 MG/1
1 TABLET ORAL DAILY
Qty: 90 TABLET | Refills: 3 | Status: SHIPPED | OUTPATIENT
Start: 2024-05-21

## 2024-05-21 ASSESSMENT — PATIENT HEALTH QUESTIONNAIRE - PHQ9
SUM OF ALL RESPONSES TO PHQ QUESTIONS 1-9: 0
1. LITTLE INTEREST OR PLEASURE IN DOING THINGS: NOT AT ALL
2. FEELING DOWN, DEPRESSED OR HOPELESS: NOT AT ALL
SUM OF ALL RESPONSES TO PHQ QUESTIONS 1-9: 0
SUM OF ALL RESPONSES TO PHQ9 QUESTIONS 1 & 2: 0

## 2024-05-21 NOTE — PATIENT INSTRUCTIONS
Hospital Outpatient Visit on 05/21/2024   Component Date Value Ref Range Status    Sodium 05/21/2024 141  136 - 145 mmol/L Final    Potassium 05/21/2024 4.3  3.5 - 5.1 mmol/L Final    Chloride 05/21/2024 107  98 - 107 mmol/L Final    CO2 05/21/2024 23  20 - 28 mmol/L Final    Anion Gap 05/21/2024 11  9 - 18 mmol/L Final    Glucose 05/21/2024 125 (H)  70 - 99 mg/dL Final    Comment: <70 mg/dL Consistent with, but not fully diagnostic of hypoglycemia.  100 - 125 mg/dL Impaired fasting glucose/consistent with pre-diabetes mellitus.  > 126 mg/dl Fasting glucose consistent with overt diabetes mellitus      BUN 05/21/2024 11  8 - 23 MG/DL Final    Creatinine 05/21/2024 0.83  0.60 - 1.10 MG/DL Final    Est, Glom Filt Rate 05/21/2024 76  >60 ml/min/1.73m2 Final    Comment:    Pediatric calculator link: https://www.kidney.org/professionals/kdoqi/gfr_calculatorped     These results are not intended for use in patients <18 years of age.     eGFR results are calculated without a race factor using  the 2021 CKD-EPI equation. Careful clinical correlation is recommended, particularly when comparing to results calculated using previous equations.  The CKD-EPI equation is less accurate in patients with extremes of muscle mass, extra-renal metabolism of creatinine, excessive creatine ingestion, or following therapy that affects renal tubular secretion.      Calcium 05/21/2024 9.4  8.8 - 10.2 MG/DL Final    Total Bilirubin 05/21/2024 0.4  0.0 - 1.2 MG/DL Final    ALT 05/21/2024 33  12 - 65 U/L Final    AST 05/21/2024 28  15 - 37 U/L Final    Alk Phosphatase 05/21/2024 66  35 - 104 U/L Final    Total Protein 05/21/2024 6.4  6.3 - 8.2 g/dL Final    Albumin 05/21/2024 3.8  3.2 - 4.6 g/dL Final    Globulin 05/21/2024 2.6  2.3 - 3.5 g/dL Final    Albumin/Globulin Ratio 05/21/2024 1.5  1.0 - 1.9   Final    WBC 05/21/2024 5.4  4.3 - 11.1 K/uL Final    RBC 05/21/2024 4.95  4.05 - 5.2 M/uL Final    Hemoglobin 05/21/2024 13.9  11.7 - 15.4 g/dL

## 2024-05-21 NOTE — PROGRESS NOTES
[facility] On[facility]: Office Visit Established Patient    Chief Complaint:    Chief Complaint   Patient presents with    Follow-up       History of Present Illness:  Ms. Engel is a 69 y.o. female who presents today for follow-up regarding breast cancer.  She initially presented for a routine bilateral screening mammogram on 9/9/22 which identified a right posterocentral outer mid breast mass and associated microcalcifications. Further evaluation with right breast diagnostic mammogram on 9/27/2022 showed mass-like density and associated calcifications persisted in the outer right breast demonstrating suspicious features with spiculated margins and suggested architectural distortion. Right breast ultrasound also performed on 9/27/22 confirmed a hypoechoic shadowing mass at the 9 o'clock position of the right breast measuring 2.7 cm x 3 cm x 1.9 cm demonstrating multiple highly suspicious features.   Recommended ultrasound-guided biopsy of the right breast lesion was performed on 10/5/22 with pathology revealing ER 98%/NJ 92% positive, HER-2 (0) negative, high grade infiltrating ductal carcinoma.  MRI of the bilateral breasts as completed on 10/19/22 demonstrating right 9:00 breast cancer measuring up to 6.4 cm by MRI, with no suspicious left breast finding and no obvious lymphadenopathy.  She was referred to [facility] for Medical Oncology evaluation and treatment.  Normally we would recommend upfront surgery for a tumor with strong HR expression and negative HER2, but the size of the tumor on MRI suggests that she cannot have breast conserving surgery unless she has some cytoreduction prior.  If she does strongly desire BCT and it is not feasible with her current tumor dimensions, I would recommend AC-T for neoadjuvant therapy.  On the other hand, if she is willing to consider mastectomy, I would favor upfront surgery as the questionable tumor dimensions, the clinically negative nodes, and the strong HR

## 2024-07-21 DIAGNOSIS — E78.5 DYSLIPIDEMIA: ICD-10-CM

## 2024-07-22 RX ORDER — ROSUVASTATIN CALCIUM 20 MG/1
20 TABLET, COATED ORAL NIGHTLY
Qty: 90 TABLET | Refills: 3 | Status: SHIPPED | OUTPATIENT
Start: 2024-07-22

## 2024-08-07 ENCOUNTER — OFFICE VISIT (OUTPATIENT)
Dept: FAMILY MEDICINE CLINIC | Facility: CLINIC | Age: 69
End: 2024-08-07

## 2024-08-07 VITALS
HEART RATE: 77 BPM | BODY MASS INDEX: 33.31 KG/M2 | WEIGHT: 181 LBS | DIASTOLIC BLOOD PRESSURE: 83 MMHG | RESPIRATION RATE: 18 BRPM | HEIGHT: 62 IN | OXYGEN SATURATION: 94 % | TEMPERATURE: 97.2 F | SYSTOLIC BLOOD PRESSURE: 129 MMHG

## 2024-08-07 DIAGNOSIS — I10 PRIMARY HYPERTENSION: ICD-10-CM

## 2024-08-07 DIAGNOSIS — R73.09 ABNORMAL GLUCOSE: ICD-10-CM

## 2024-08-07 DIAGNOSIS — Z12.11 SCREEN FOR COLON CANCER: ICD-10-CM

## 2024-08-07 DIAGNOSIS — Z00.00 MEDICARE ANNUAL WELLNESS VISIT, SUBSEQUENT: Primary | ICD-10-CM

## 2024-08-07 DIAGNOSIS — E55.9 VITAMIN D DEFICIENCY: ICD-10-CM

## 2024-08-07 DIAGNOSIS — R63.5 WEIGHT GAIN: ICD-10-CM

## 2024-08-07 DIAGNOSIS — E78.5 DYSLIPIDEMIA: ICD-10-CM

## 2024-08-07 LAB
25(OH)D3 SERPL-MCNC: 30.6 NG/ML (ref 30–100)
ALBUMIN SERPL-MCNC: 3.9 G/DL (ref 3.2–4.6)
ALBUMIN/GLOB SERPL: 1.5 (ref 1–1.9)
ALP SERPL-CCNC: 65 U/L (ref 35–104)
ALT SERPL-CCNC: 31 U/L (ref 12–65)
ANION GAP SERPL CALC-SCNC: 9 MMOL/L (ref 9–18)
AST SERPL-CCNC: 26 U/L (ref 15–37)
BILIRUB SERPL-MCNC: 0.3 MG/DL (ref 0–1.2)
BUN SERPL-MCNC: 15 MG/DL (ref 8–23)
CALCIUM SERPL-MCNC: 9.2 MG/DL (ref 8.8–10.2)
CHLORIDE SERPL-SCNC: 105 MMOL/L (ref 98–107)
CHOLEST SERPL-MCNC: 141 MG/DL (ref 0–200)
CO2 SERPL-SCNC: 26 MMOL/L (ref 20–28)
CREAT SERPL-MCNC: 0.89 MG/DL (ref 0.6–1.1)
CREAT UR-MCNC: 136 MG/DL (ref 28–217)
EST. AVERAGE GLUCOSE BLD GHB EST-MCNC: 119 MG/DL
GLOBULIN SER CALC-MCNC: 2.5 G/DL (ref 2.3–3.5)
GLUCOSE SERPL-MCNC: 99 MG/DL (ref 70–99)
HBA1C MFR BLD: 5.8 % (ref 0–5.6)
HDLC SERPL-MCNC: 42 MG/DL (ref 40–60)
HDLC SERPL: 3.3 (ref 0–5)
LDLC SERPL CALC-MCNC: 65 MG/DL (ref 0–100)
MICROALBUMIN UR-MCNC: <1.2 MG/DL (ref 0–20)
MICROALBUMIN/CREAT UR-RTO: NORMAL MG/G (ref 0–30)
POTASSIUM SERPL-SCNC: 4.6 MMOL/L (ref 3.5–5.1)
PROT SERPL-MCNC: 6.4 G/DL (ref 6.3–8.2)
SODIUM SERPL-SCNC: 140 MMOL/L (ref 136–145)
TRIGL SERPL-MCNC: 170 MG/DL (ref 0–150)
TSH, 3RD GENERATION: 1.32 UIU/ML (ref 0.27–4.2)
VLDLC SERPL CALC-MCNC: 34 MG/DL (ref 6–23)

## 2024-08-07 RX ORDER — LISINOPRIL 10 MG/1
10 TABLET ORAL DAILY
Qty: 90 TABLET | Refills: 1 | Status: SHIPPED | OUTPATIENT
Start: 2024-08-07

## 2024-08-07 SDOH — ECONOMIC STABILITY: FOOD INSECURITY: WITHIN THE PAST 12 MONTHS, THE FOOD YOU BOUGHT JUST DIDN'T LAST AND YOU DIDN'T HAVE MONEY TO GET MORE.: NEVER TRUE

## 2024-08-07 SDOH — ECONOMIC STABILITY: FOOD INSECURITY: WITHIN THE PAST 12 MONTHS, YOU WORRIED THAT YOUR FOOD WOULD RUN OUT BEFORE YOU GOT MONEY TO BUY MORE.: NEVER TRUE

## 2024-08-07 SDOH — ECONOMIC STABILITY: INCOME INSECURITY: HOW HARD IS IT FOR YOU TO PAY FOR THE VERY BASICS LIKE FOOD, HOUSING, MEDICAL CARE, AND HEATING?: NOT HARD AT ALL

## 2024-08-07 ASSESSMENT — ENCOUNTER SYMPTOMS
VOMITING: 0
ORTHOPNEA: 0
RHINORRHEA: 0
NAUSEA: 0
COUGH: 0
WHEEZING: 0
ABDOMINAL PAIN: 0
BLURRED VISION: 0
SHORTNESS OF BREATH: 0
CONSTIPATION: 0
DIARRHEA: 0

## 2024-08-07 ASSESSMENT — PATIENT HEALTH QUESTIONNAIRE - PHQ9
2. FEELING DOWN, DEPRESSED OR HOPELESS: NOT AT ALL
SUM OF ALL RESPONSES TO PHQ QUESTIONS 1-9: 0
SUM OF ALL RESPONSES TO PHQ9 QUESTIONS 1 & 2: 0
SUM OF ALL RESPONSES TO PHQ QUESTIONS 1-9: 0
1. LITTLE INTEREST OR PLEASURE IN DOING THINGS: NOT AT ALL

## 2024-08-07 ASSESSMENT — LIFESTYLE VARIABLES
HOW MANY STANDARD DRINKS CONTAINING ALCOHOL DO YOU HAVE ON A TYPICAL DAY: 1 OR 2
HOW OFTEN DO YOU HAVE A DRINK CONTAINING ALCOHOL: MONTHLY OR LESS

## 2024-08-07 NOTE — PROGRESS NOTES
HISTORY OF PRESENT ILLNESS  Chief Complaint   Patient presents with    Medicare AW     NOTICE FOR THE PATIENT: This clinical note is not designed to be interpreted by patients.  We do not recommend reading it unless you have medical training. These notes may contain candid and (unintentionally) offensive descriptions, which are sometimes required for accurate documentation. If you would like more information about your healthcare, please obtain it directly by myself or my staff/colleagues - never solely from the notes. Thank you for your understanding and cooperation.     Helping out with 4.5 mo grandson today.    Is scheduled for a mamm and dexa scan.  Eye appt on Wednesday.    Less active with the heat.  More soda and fast food this summer.    Subjective:   Rosy Engel is a 69 y.o. female with hypertension.    Hypertension ROS: taking medications as instructed, no medication side effects noted, no TIA's, no chest pain on exertion, no dyspnea on exertion, no swelling of ankles.   Cardiac Medications       ACE Inhibitors       lisinopril (PRINIVIL;ZESTRIL) 10 MG tablet Take 1 tablet by mouth daily       HMG CoA Reductase Inhibitors       rosuvastatin (CRESTOR) 20 MG tablet TAKE 1 TABLET BY MOUTH EVERY DAY AT NIGHT           Lab Results   Component Value Date/Time     05/21/2024 09:33 AM    K 4.3 05/21/2024 09:33 AM     05/21/2024 09:33 AM    CO2 23 05/21/2024 09:33 AM    BUN 11 05/21/2024 09:33 AM    CREATININE 0.83 05/21/2024 09:33 AM    GLUCOSE 125 05/21/2024 09:33 AM    CALCIUM 9.4 05/21/2024 09:33 AM       Lab Results   Component Value Date    CREATININE 0.83 05/21/2024      Lab Results   Component Value Date    CHOL 128 11/11/2022    CHOL 256 (H) 07/27/2022    CHOL 230 (H) 07/01/2021     Lab Results   Component Value Date    TRIG 116 11/11/2022    TRIG 161 (H) 07/27/2022    TRIG 95 07/01/2021     Lab Results   Component Value Date    HDL 47 11/11/2022    HDL 42 07/27/2022    HDL 48

## 2024-09-18 LAB — NONINV COLON CA DNA+OCC BLD SCRN STL QL: NEGATIVE

## 2024-09-23 ENCOUNTER — TRANSCRIBE ORDERS (OUTPATIENT)
Dept: SCHEDULING | Age: 69
End: 2024-09-23

## 2024-09-23 DIAGNOSIS — Z12.31 SCREENING MAMMOGRAM FOR HIGH-RISK PATIENT: Primary | ICD-10-CM

## 2024-10-31 ENCOUNTER — TELEPHONE (OUTPATIENT)
Dept: ONCOLOGY | Age: 69
End: 2024-10-31

## 2024-11-06 ENCOUNTER — HOSPITAL ENCOUNTER (OUTPATIENT)
Dept: MAMMOGRAPHY | Age: 69
Discharge: HOME | End: 2024-11-09
Payer: MEDICARE

## 2024-11-06 DIAGNOSIS — C50.811 MALIGNANT NEOPLASM OF OVERLAPPING SITES OF RIGHT BREAST IN FEMALE, ESTROGEN RECEPTOR POSITIVE (HCC): ICD-10-CM

## 2024-11-06 DIAGNOSIS — Z79.811 AROMATASE INHIBITOR USE: ICD-10-CM

## 2024-11-06 DIAGNOSIS — Z12.31 ENCOUNTER FOR SCREENING MAMMOGRAM FOR HIGH-RISK PATIENT: ICD-10-CM

## 2024-11-06 DIAGNOSIS — Z17.0 MALIGNANT NEOPLASM OF OVERLAPPING SITES OF RIGHT BREAST IN FEMALE, ESTROGEN RECEPTOR POSITIVE (HCC): ICD-10-CM

## 2024-11-06 PROCEDURE — 77080 DXA BONE DENSITY AXIAL: CPT

## 2024-11-06 PROCEDURE — 77063 BREAST TOMOSYNTHESIS BI: CPT

## 2024-11-27 ENCOUNTER — HOSPITAL ENCOUNTER (OUTPATIENT)
Dept: LAB | Age: 69
Discharge: HOME | End: 2024-11-27
Payer: MEDICARE

## 2024-11-27 ENCOUNTER — OFFICE VISIT (OUTPATIENT)
Dept: ONCOLOGY | Age: 69
End: 2024-11-27
Payer: MEDICARE

## 2024-11-27 VITALS
SYSTOLIC BLOOD PRESSURE: 142 MMHG | DIASTOLIC BLOOD PRESSURE: 83 MMHG | HEIGHT: 62 IN | HEART RATE: 77 BPM | TEMPERATURE: 97.6 F | WEIGHT: 177 LBS | RESPIRATION RATE: 18 BRPM | BODY MASS INDEX: 32.57 KG/M2 | OXYGEN SATURATION: 98 %

## 2024-11-27 DIAGNOSIS — C50.811 MALIGNANT NEOPLASM OF OVERLAPPING SITES OF RIGHT BREAST IN FEMALE, ESTROGEN RECEPTOR POSITIVE (HCC): ICD-10-CM

## 2024-11-27 DIAGNOSIS — Z17.0 MALIGNANT NEOPLASM OF OVERLAPPING SITES OF RIGHT BREAST IN FEMALE, ESTROGEN RECEPTOR POSITIVE (HCC): ICD-10-CM

## 2024-11-27 DIAGNOSIS — Z12.31 SCREENING MAMMOGRAM FOR BREAST CANCER: ICD-10-CM

## 2024-11-27 DIAGNOSIS — Z79.811 AROMATASE INHIBITOR USE: ICD-10-CM

## 2024-11-27 DIAGNOSIS — Z17.0 MALIGNANT NEOPLASM OF OVERLAPPING SITES OF RIGHT BREAST IN FEMALE, ESTROGEN RECEPTOR POSITIVE (HCC): Primary | ICD-10-CM

## 2024-11-27 DIAGNOSIS — C50.811 MALIGNANT NEOPLASM OF OVERLAPPING SITES OF RIGHT BREAST IN FEMALE, ESTROGEN RECEPTOR POSITIVE (HCC): Primary | ICD-10-CM

## 2024-11-27 LAB
ALBUMIN SERPL-MCNC: 4 G/DL (ref 3.2–4.6)
ALBUMIN/GLOB SERPL: 1.3 (ref 1–1.9)
ALP SERPL-CCNC: 69 U/L (ref 35–104)
ALT SERPL-CCNC: 29 U/L (ref 8–45)
ANION GAP SERPL CALC-SCNC: 8 MMOL/L (ref 7–16)
AST SERPL-CCNC: 30 U/L (ref 15–37)
BASOPHILS # BLD: 0 K/UL (ref 0–0.2)
BASOPHILS NFR BLD: 1 % (ref 0–2)
BILIRUB SERPL-MCNC: 0.5 MG/DL (ref 0–1.2)
BUN SERPL-MCNC: 14 MG/DL (ref 8–23)
CALCIUM SERPL-MCNC: 9.7 MG/DL (ref 8.8–10.2)
CHLORIDE SERPL-SCNC: 104 MMOL/L (ref 98–107)
CO2 SERPL-SCNC: 29 MMOL/L (ref 20–29)
CREAT SERPL-MCNC: 0.92 MG/DL (ref 0.6–1.1)
DIFFERENTIAL METHOD BLD: ABNORMAL
EOSINOPHIL # BLD: 0.1 K/UL (ref 0–0.8)
EOSINOPHIL NFR BLD: 2 % (ref 0.5–7.8)
ERYTHROCYTE [DISTWIDTH] IN BLOOD BY AUTOMATED COUNT: 13.9 % (ref 11.9–14.6)
GLOBULIN SER CALC-MCNC: 3.2 G/DL (ref 2.3–3.5)
GLUCOSE SERPL-MCNC: 99 MG/DL (ref 70–99)
HCT VFR BLD AUTO: 42.9 % (ref 35.8–46.3)
HGB BLD-MCNC: 14 G/DL (ref 11.7–15.4)
IMM GRANULOCYTES # BLD AUTO: 0 K/UL (ref 0–0.5)
IMM GRANULOCYTES NFR BLD AUTO: 0 % (ref 0–5)
LYMPHOCYTES # BLD: 2.3 K/UL (ref 0.5–4.6)
LYMPHOCYTES NFR BLD: 38 % (ref 13–44)
MCH RBC QN AUTO: 28.1 PG (ref 26.1–32.9)
MCHC RBC AUTO-ENTMCNC: 32.6 G/DL (ref 31.4–35)
MCV RBC AUTO: 86.1 FL (ref 82–102)
MONOCYTES # BLD: 0.7 K/UL (ref 0.1–1.3)
MONOCYTES NFR BLD: 12 % (ref 4–12)
NEUTS SEG # BLD: 3 K/UL (ref 1.7–8.2)
NEUTS SEG NFR BLD: 47 % (ref 43–78)
NRBC # BLD: 0 K/UL (ref 0–0.2)
PLATELET # BLD AUTO: 247 K/UL (ref 150–450)
PMV BLD AUTO: 8.7 FL (ref 9.4–12.3)
POTASSIUM SERPL-SCNC: 4.6 MMOL/L (ref 3.5–5.1)
PROT SERPL-MCNC: 7.2 G/DL (ref 6.3–8.2)
RBC # BLD AUTO: 4.98 M/UL (ref 4.05–5.2)
SODIUM SERPL-SCNC: 141 MMOL/L (ref 136–145)
WBC # BLD AUTO: 6.1 K/UL (ref 4.3–11.1)

## 2024-11-27 PROCEDURE — G8484 FLU IMMUNIZE NO ADMIN: HCPCS | Performed by: INTERNAL MEDICINE

## 2024-11-27 PROCEDURE — 3079F DIAST BP 80-89 MM HG: CPT | Performed by: INTERNAL MEDICINE

## 2024-11-27 PROCEDURE — 3017F COLORECTAL CA SCREEN DOC REV: CPT | Performed by: INTERNAL MEDICINE

## 2024-11-27 PROCEDURE — G8417 CALC BMI ABV UP PARAM F/U: HCPCS | Performed by: INTERNAL MEDICINE

## 2024-11-27 PROCEDURE — 85025 COMPLETE CBC W/AUTO DIFF WBC: CPT

## 2024-11-27 PROCEDURE — 1036F TOBACCO NON-USER: CPT | Performed by: INTERNAL MEDICINE

## 2024-11-27 PROCEDURE — 1123F ACP DISCUSS/DSCN MKR DOCD: CPT | Performed by: INTERNAL MEDICINE

## 2024-11-27 PROCEDURE — 99214 OFFICE O/P EST MOD 30 MIN: CPT | Performed by: INTERNAL MEDICINE

## 2024-11-27 PROCEDURE — 1126F AMNT PAIN NOTED NONE PRSNT: CPT | Performed by: INTERNAL MEDICINE

## 2024-11-27 PROCEDURE — 1090F PRES/ABSN URINE INCON ASSESS: CPT | Performed by: INTERNAL MEDICINE

## 2024-11-27 PROCEDURE — G8399 PT W/DXA RESULTS DOCUMENT: HCPCS | Performed by: INTERNAL MEDICINE

## 2024-11-27 PROCEDURE — G8428 CUR MEDS NOT DOCUMENT: HCPCS | Performed by: INTERNAL MEDICINE

## 2024-11-27 PROCEDURE — 80053 COMPREHEN METABOLIC PANEL: CPT

## 2024-11-27 PROCEDURE — 36415 COLL VENOUS BLD VENIPUNCTURE: CPT

## 2024-11-27 PROCEDURE — 3077F SYST BP >= 140 MM HG: CPT | Performed by: INTERNAL MEDICINE

## 2024-11-27 NOTE — PROGRESS NOTES
[facility]ology [facility] On[facility]: Office Visit Established Patient    Chief Complaint:    Chief Complaint   Patient presents with    Follow-up       History of Present Illness:  Ms. Engel is a 69 y.o. female who presents today for follow-up regarding breast cancer.  She initially presented for a routine bilateral screening mammogram on 9/9/22 which identified a right posterocentral outer mid breast mass and associated microcalcifications. Further evaluation with right breast diagnostic mammogram on 9/27/2022 showed mass-like density and associated calcifications persisted in the outer right breast demonstrating suspicious features with spiculated margins and suggested architectural distortion. Right breast ultrasound also performed on 9/27/22 confirmed a hypoechoic shadowing mass at the 9 o'clock position of the right breast measuring 2.7 cm x 3 cm x 1.9 cm demonstrating multiple highly suspicious features.   Recommended ultrasound-guided biopsy of the right breast lesion was performed on 10/5/22 with pathology revealing ER 98%/CT 92% positive, HER-2 (0) negative, high grade infiltrating ductal carcinoma.  MRI of the bilateral breasts as completed on 10/19/22 demonstrating right 9:00 breast cancer measuring up to 6.4 cm by MRI, with no suspicious left breast finding and no obvious lymphadenopathy.  She was referred to [facility] for Medical Oncology evaluation and treatment.  Normally we would recommend upfront surgery for a tumor with strong HR expression and negative HER2, but the size of the tumor on MRI suggests that she cannot have breast conserving surgery unless she has some cytoreduction prior.  If she does strongly desire BCT and it is not feasible with her current tumor dimensions, I would recommend AC-T for neoadjuvant therapy.  On the other hand, if she is willing to consider mastectomy, I would favor upfront surgery as the questionable tumor dimensions, the clinically negative nodes, and the strong HR

## 2024-11-27 NOTE — PATIENT INSTRUCTIONS
Patient Information from Today's Visit    The members of your [facility] are listed below:    Physician Provider: Alfa Porter, Medical Oncologist  Advanced Practice Clinician: Mariola Crocker NP  Registered Nurse: Blas COPE RN  Nurse Navigator: Roula COREAS RN and Payton VILLANUEVA RN  Medical Assistant: Yola JIMENEZ CMA  :Luisa VALDEZ  Supportive Care Services: Marlyn DOMINIQUE LMSW    Diagnosis: Breast Cancer    Follow Up Instructions: 6 months      Treatment Summary has been discussed and given to patient:No      Current Labs:   Hospital Outpatient Visit on 11/27/2024   Component Date Value Ref Range Status    WBC 11/27/2024 6.1  4.3 - 11.1 K/uL Final    RBC 11/27/2024 4.98  4.05 - 5.2 M/uL Final    Hemoglobin 11/27/2024 14.0  11.7 - 15.4 g/dL Final    Hematocrit 11/27/2024 42.9  35.8 - 46.3 % Final    MCV 11/27/2024 86.1  82.0 - 102.0 FL Final    MCH 11/27/2024 28.1  26.1 - 32.9 PG Final    MCHC 11/27/2024 32.6  31.4 - 35.0 g/dL Final    RDW 11/27/2024 13.9  11.9 - 14.6 % Final    Platelets 11/27/2024 247  150 - 450 K/uL Final    MPV 11/27/2024 8.7 (L)  9.4 - 12.3 FL Final    nRBC 11/27/2024 0.00  0.0 - 0.2 K/uL Final    **Note: Absolute NRBC parameter is now reported with Hemogram**    Neutrophils % 11/27/2024 47  43 - 78 % Final    Lymphocytes % 11/27/2024 38  13 - 44 % Final    Monocytes % 11/27/2024 12  4.0 - 12.0 % Final    Eosinophils % 11/27/2024 2  0.5 - 7.8 % Final    Basophils % 11/27/2024 1  0.0 - 2.0 % Final    Immature Granulocytes % 11/27/2024 0  0.0 - 5.0 % Final    Neutrophils Absolute 11/27/2024 3.0  1.7 - 8.2 K/UL Final    Lymphocytes Absolute 11/27/2024 2.3  0.5 - 4.6 K/UL Final    Monocytes Absolute 11/27/2024 0.7  0.1 - 1.3 K/UL Final    Eosinophils Absolute 11/27/2024 0.1  0.0 - 0.8 K/UL Final    Basophils Absolute 11/27/2024 0.0  0.0 - 0.2 K/UL Final    Immature Granulocytes Absolute 11/27/2024 0.0  0.0 - 0.5 K/UL Final    Differential Type 11/27/2024 AUTOMATED    Final    Sodium

## 2025-02-11 PROBLEM — R50.81 NEUTROPENIC FEVER (HCC): Status: RESOLVED | Noted: 2023-02-06 | Resolved: 2025-02-11

## 2025-02-11 PROBLEM — D70.9 NEUTROPENIC FEVER (HCC): Status: RESOLVED | Noted: 2023-02-06 | Resolved: 2025-02-11

## 2025-02-11 ASSESSMENT — ENCOUNTER SYMPTOMS
SHORTNESS OF BREATH: 0
VOMITING: 0
NAUSEA: 0
ABDOMINAL PAIN: 0
ORTHOPNEA: 0
BLURRED VISION: 0
COUGH: 0

## 2025-02-12 ENCOUNTER — OFFICE VISIT (OUTPATIENT)
Dept: FAMILY MEDICINE CLINIC | Facility: CLINIC | Age: 70
End: 2025-02-12

## 2025-02-12 VITALS
HEART RATE: 74 BPM | SYSTOLIC BLOOD PRESSURE: 139 MMHG | WEIGHT: 173 LBS | HEIGHT: 62 IN | DIASTOLIC BLOOD PRESSURE: 78 MMHG | OXYGEN SATURATION: 99 % | RESPIRATION RATE: 16 BRPM | TEMPERATURE: 97.6 F | BODY MASS INDEX: 31.83 KG/M2

## 2025-02-12 DIAGNOSIS — I10 PRIMARY HYPERTENSION: Primary | ICD-10-CM

## 2025-02-12 DIAGNOSIS — M25.532 PAIN IN LEFT WRIST: ICD-10-CM

## 2025-02-12 DIAGNOSIS — Z17.0 MALIGNANT NEOPLASM OF OVERLAPPING SITES OF RIGHT BREAST IN FEMALE, ESTROGEN RECEPTOR POSITIVE (HCC): ICD-10-CM

## 2025-02-12 DIAGNOSIS — C50.811 MALIGNANT NEOPLASM OF OVERLAPPING SITES OF RIGHT BREAST IN FEMALE, ESTROGEN RECEPTOR POSITIVE (HCC): ICD-10-CM

## 2025-02-12 DIAGNOSIS — M65.4 TENOSYNOVITIS, DE QUERVAIN: ICD-10-CM

## 2025-02-12 DIAGNOSIS — Z23 ENCOUNTER FOR IMMUNIZATION: ICD-10-CM

## 2025-02-12 RX ORDER — LISINOPRIL 10 MG/1
10 TABLET ORAL DAILY
Qty: 90 TABLET | Refills: 2 | Status: SHIPPED | OUTPATIENT
Start: 2025-02-12

## 2025-02-12 SDOH — ECONOMIC STABILITY: FOOD INSECURITY: WITHIN THE PAST 12 MONTHS, THE FOOD YOU BOUGHT JUST DIDN'T LAST AND YOU DIDN'T HAVE MONEY TO GET MORE.: NEVER TRUE

## 2025-02-12 SDOH — ECONOMIC STABILITY: FOOD INSECURITY: WITHIN THE PAST 12 MONTHS, YOU WORRIED THAT YOUR FOOD WOULD RUN OUT BEFORE YOU GOT MONEY TO BUY MORE.: NEVER TRUE

## 2025-02-12 ASSESSMENT — PATIENT HEALTH QUESTIONNAIRE - PHQ9
1. LITTLE INTEREST OR PLEASURE IN DOING THINGS: NOT AT ALL
SUM OF ALL RESPONSES TO PHQ QUESTIONS 1-9: 0
SUM OF ALL RESPONSES TO PHQ QUESTIONS 1-9: 0
SUM OF ALL RESPONSES TO PHQ9 QUESTIONS 1 & 2: 0
SUM OF ALL RESPONSES TO PHQ QUESTIONS 1-9: 0
SUM OF ALL RESPONSES TO PHQ QUESTIONS 1-9: 0
2. FEELING DOWN, DEPRESSED OR HOPELESS: NOT AT ALL

## 2025-02-12 ASSESSMENT — ENCOUNTER SYMPTOMS
BACK PAIN: 0
RHINORRHEA: 0
WHEEZING: 0
CONSTIPATION: 0
COLOR CHANGE: 0
DIARRHEA: 0

## 2025-02-12 NOTE — PROGRESS NOTES
Reviewed medications and side effects in detail    Assessment & Plan  1. De Quervain's tenosynovitis.  The condition is likely due to overuse, as she reports lifting heavy objects and using a heavy book bag. She reports that the wrist splint previously used was not effective and may have aggravated the condition. She is advised to use a wrist splint that immobilizes the thumb. A prescription for Voltaren gel will be provided to apply on the affected tendon. She is also advised to perform specific stretches and exercises to alleviate the symptoms.    2. Hypertension.  Her blood pressure readings are within the normal range during this visit. She is advised to monitor her blood pressure at home and report the readings during her next visit.    3. Health maintenance.  She has had her mammogram and colon screening done. She received a flu shot today. Her vitamin D levels were previously low, and she is currently taking vitamin D with her calcium pill at dinner. She is advised to continue this regimen. A refill for her vitamin D prescription will be sent to [facility] on [address]. In 6 months, her cholesterol and A1c levels will be rechecked.          Her other chronic conditions are stable at this time.  She continues to be followed by her previous specialists for the above chronic issues.  She is stable on the current treatment and tolerating it well.  No significant sides effects.  Will not adjust therapy at this time, unless noted above.   We will continue to monitor for any problems.  Medications refilled and lab work has been ordered where needed.  Reviewed medications are explained including any potential interactions or side effects in detail. The patient's questions were answered.  She  understands the above and has no further questions.    Further workup and treatment should be done if symptoms persist, worsen or new symptoms occur. She  will call to notify us of any problems, complications or worsening

## 2025-03-16 ENCOUNTER — HOSPITAL ENCOUNTER (EMERGENCY)
Age: 70
Discharge: HOME | End: 2025-03-16
Payer: MEDICARE

## 2025-03-16 ENCOUNTER — APPOINTMENT (OUTPATIENT)
Dept: CT IMAGING | Age: 70
End: 2025-03-16
Payer: MEDICARE

## 2025-03-16 VITALS
HEIGHT: 62 IN | DIASTOLIC BLOOD PRESSURE: 72 MMHG | WEIGHT: 170 LBS | BODY MASS INDEX: 31.28 KG/M2 | RESPIRATION RATE: 16 BRPM | HEART RATE: 72 BPM | OXYGEN SATURATION: 97 % | SYSTOLIC BLOOD PRESSURE: 158 MMHG | TEMPERATURE: 98.1 F

## 2025-03-16 DIAGNOSIS — S09.90XA CLOSED HEAD INJURY, INITIAL ENCOUNTER: Primary | ICD-10-CM

## 2025-03-16 PROCEDURE — 70450 CT HEAD/BRAIN W/O DYE: CPT

## 2025-03-16 PROCEDURE — 99284 EMERGENCY DEPT VISIT MOD MDM: CPT

## 2025-03-16 ASSESSMENT — PAIN - FUNCTIONAL ASSESSMENT: PAIN_FUNCTIONAL_ASSESSMENT: 0-10

## 2025-03-16 ASSESSMENT — PAIN DESCRIPTION - LOCATION: LOCATION: HEAD

## 2025-03-16 ASSESSMENT — PAIN SCALES - GENERAL: PAINLEVEL_OUTOF10: 3

## 2025-03-16 ASSESSMENT — PAIN DESCRIPTION - ORIENTATION: ORIENTATION: RIGHT

## 2025-03-16 NOTE — ED PROVIDER NOTES
Emergency Department Provider Note       PCP: Nickie Thibodeaux MD   Age: 70 y.o.   Sex: female     DISPOSITION Decision To Discharge 03/16/2025 08:12:01 PM   DISPOSITION CONDITION Stable            ICD-10-CM    1. Closed head injury, initial encounter  S09.90XA           Medical Decision Making     Patient presents to the ER with complaint of fall.  Patient was coming down the stairs and did not realize the dog had left the tennis ball on the stairs.  Patient stepped on the ball and fell hitting the right side of her head against the door jam.  Patient denies LOC or vomiting.  Denies being on blood thinners.  Patient reports a little swelling on the right side of her head.    CT head is negative for acute process.  Discussed results with patient.  Discussed taking Tylenol as needed for pain.  Discussed head injury precautions and when to return to the ER.  Discussed cool compresses to her head.  Discussed follow-up.  Patient verbalized understanding and agrees with plan.     1 acute complicated illness or injury.  Shared medical decision making was utilized in creating the patients health plan today.  I independently ordered and reviewed each unique test.    I reviewed external records: provider visit note from PCP.                     History     70-year-old female presents to the ER with complaint of fall.  Patient states she was coming down the stairs and did not realize that her dog had left a tennis ball on the stairs.  Patient states she stepped on the ball and fell hitting the right side of her head on the door jam.  Patient denies LOC or vomiting.  States she was a little nauseous on the way here.  Patient denies being on blood thinners.  Patient reports a little swelling on the right side of her head.  Patient denies any dizziness or other symptoms.    The history is provided by the patient.     Physical Exam     Vitals signs and nursing note reviewed:  Vitals:    03/16/25 1855   BP: (!) 158/72   Pulse: 72

## 2025-03-16 NOTE — ED TRIAGE NOTES
Pt ambulatory to triage. Pt advises that she stepped on a ball on her stairs and fell hitting the R side of her head. Pt denies LOC or blood thinners.

## 2025-03-17 NOTE — ED NOTES
I have reviewed discharge instructions with the patient.  The patient verbalized understanding.    Patient left ED via Discharge Method: ambulatory to Home with self.    Opportunity for questions and clarification provided.       Patient given 0 scripts.         To continue your aftercare when you leave the hospital, you may receive an automated call from our care team to check in on how you are doing.  This is a free service and part of our promise to provide the best care and service to meet your aftercare needs.” If you have questions, or wish to unsubscribe from this service please call [phone].  Thank you for Choosing our [facility] Emergency Department.

## 2025-03-17 NOTE — DISCHARGE INSTRUCTIONS
Take Tylenol as needed for pain.     Cold compresses to sore area for 10 to 15 minutes several times a day.    Head injury precautions:  Watch for any changes in your mental or physical status, weakness in your arms or legs, vomiting or projectile vomiting, severe headache, confusion.  If you experience any of these signs or symptoms please return to the ER immediately for another evaluation.  Otherwise, follow-up with your doctor in 1 to 2 days for recheck.

## 2025-03-20 ASSESSMENT — ENCOUNTER SYMPTOMS
COUGH: 0
ABDOMINAL PAIN: 0
BLURRED VISION: 0
NAUSEA: 0
ORTHOPNEA: 0
SHORTNESS OF BREATH: 0
VOMITING: 0

## 2025-03-21 ENCOUNTER — OFFICE VISIT (OUTPATIENT)
Dept: FAMILY MEDICINE CLINIC | Facility: CLINIC | Age: 70
End: 2025-03-21

## 2025-03-21 VITALS
RESPIRATION RATE: 17 BRPM | TEMPERATURE: 97.3 F | HEART RATE: 79 BPM | DIASTOLIC BLOOD PRESSURE: 59 MMHG | WEIGHT: 175.8 LBS | SYSTOLIC BLOOD PRESSURE: 124 MMHG | HEIGHT: 62 IN | BODY MASS INDEX: 32.35 KG/M2 | OXYGEN SATURATION: 98 %

## 2025-03-21 DIAGNOSIS — S09.90XA MILD CLOSED HEAD INJURY, INITIAL ENCOUNTER: Primary | ICD-10-CM

## 2025-03-21 DIAGNOSIS — E78.5 DYSLIPIDEMIA: ICD-10-CM

## 2025-03-21 RX ORDER — ROSUVASTATIN CALCIUM 20 MG/1
20 TABLET, COATED ORAL NIGHTLY
Qty: 90 TABLET | Refills: 1 | Status: SHIPPED | OUTPATIENT
Start: 2025-03-21

## 2025-03-21 ASSESSMENT — ENCOUNTER SYMPTOMS
CONSTIPATION: 0
DIARRHEA: 0
WHEEZING: 0
RHINORRHEA: 0

## 2025-03-21 NOTE — PROGRESS NOTES
HISTORY OF PRESENT ILLNESS  Chief Complaint   Patient presents with    Follow-Up from Hospital     NOTICE FOR THE PATIENT: This clinical note is not designed to be interpreted by patients.  We do not recommend reading it unless you have medical training. These notes may contain candid and (unintentionally) offensive descriptions, which are sometimes required for accurate documentation. If you would like more information about your healthcare, please obtain it directly by myself or my staff/colleagues - never solely from the notes. Thank you for your understanding and cooperation.         Previously said, \" 3/16/2025 [facility] Emergency Department  Pt advises that she stepped on a ball on her stairs and fell hitting the R side of her head. Pt denies LOC or blood thinners.  70-year-old female presents to the ER with complaint of fall. Patient states she was coming down the stairs and did not realize that her dog had left a tennis ball on the stairs. Patient states she stepped on the ball and fell hitting the right side of her head on the door jam. Patient denies LOC or vomiting. States she was a little nauseous on the way here. Patient denies being on blood thinners. Patient reports a little swelling on the right side of her head. Patient denies any dizziness or other symptoms.  Mild swelling right lateral scalp with tenderness to palpation. No raccoon eyes or bledsoe signs. No abrasions or lacerations. No ecchymosis. No hemotympanum.     CT head is negative for acute process. Discussed results with patient. Discussed taking Tylenol as needed for pain. Discussed head injury precautions and when to return to the ER \"    Date of last Mammogram: 11/6/2024   No colonoscopy on file   Date of last Cologuard: 9/4/2024   No colonoscopy on file   Date of last Cologuard: 9/4/2024  No FIT/FOBT on file   No flexible sigmoidoscopy on file     History of Present Illness  The patient presents for an ER

## 2025-05-23 DIAGNOSIS — Z17.0 MALIGNANT NEOPLASM OF OVERLAPPING SITES OF RIGHT BREAST IN FEMALE, ESTROGEN RECEPTOR POSITIVE (HCC): ICD-10-CM

## 2025-05-23 DIAGNOSIS — C50.811 MALIGNANT NEOPLASM OF OVERLAPPING SITES OF RIGHT BREAST IN FEMALE, ESTROGEN RECEPTOR POSITIVE (HCC): ICD-10-CM

## 2025-05-23 LAB
ALBUMIN SERPL-MCNC: 3.8 G/DL (ref 3.2–4.6)
ALBUMIN/GLOB SERPL: 1.3 (ref 1–1.9)
ALP SERPL-CCNC: 71 U/L (ref 35–104)
ALT SERPL-CCNC: 33 U/L (ref 8–45)
ANION GAP SERPL CALC-SCNC: 9 MMOL/L (ref 7–16)
AST SERPL-CCNC: 31 U/L (ref 15–37)
BASOPHILS # BLD: 0.04 K/UL (ref 0–0.2)
BASOPHILS NFR BLD: 0.7 % (ref 0–2)
BILIRUB SERPL-MCNC: 0.4 MG/DL (ref 0–1.2)
BUN SERPL-MCNC: 12 MG/DL (ref 8–23)
CALCIUM SERPL-MCNC: 9.7 MG/DL (ref 8.8–10.2)
CHLORIDE SERPL-SCNC: 105 MMOL/L (ref 98–107)
CO2 SERPL-SCNC: 26 MMOL/L (ref 20–29)
CREAT SERPL-MCNC: 0.9 MG/DL (ref 0.6–1.1)
DIFFERENTIAL METHOD BLD: ABNORMAL
EOSINOPHIL # BLD: 0.1 K/UL (ref 0–0.8)
EOSINOPHIL NFR BLD: 1.7 % (ref 0.5–7.8)
ERYTHROCYTE [DISTWIDTH] IN BLOOD BY AUTOMATED COUNT: 13.3 % (ref 11.9–14.6)
GLOBULIN SER CALC-MCNC: 3 G/DL (ref 2.3–3.5)
GLUCOSE SERPL-MCNC: 84 MG/DL (ref 70–99)
HCT VFR BLD AUTO: 42 % (ref 35.8–46.3)
HGB BLD-MCNC: 13.7 G/DL (ref 11.7–15.4)
IMM GRANULOCYTES # BLD AUTO: 0.01 K/UL (ref 0–0.5)
IMM GRANULOCYTES NFR BLD AUTO: 0.2 % (ref 0–5)
LYMPHOCYTES # BLD: 2.58 K/UL (ref 0.5–4.6)
LYMPHOCYTES NFR BLD: 43.7 % (ref 13–44)
MCH RBC QN AUTO: 28.1 PG (ref 26.1–32.9)
MCHC RBC AUTO-ENTMCNC: 32.6 G/DL (ref 31.4–35)
MCV RBC AUTO: 86.2 FL (ref 82–102)
MONOCYTES # BLD: 0.67 K/UL (ref 0.1–1.3)
MONOCYTES NFR BLD: 11.4 % (ref 4–12)
NEUTS SEG # BLD: 2.5 K/UL (ref 1.7–8.2)
NEUTS SEG NFR BLD: 42.3 % (ref 43–78)
NRBC # BLD: 0 K/UL (ref 0–0.2)
PLATELET # BLD AUTO: 251 K/UL (ref 150–450)
PMV BLD AUTO: 9.4 FL (ref 9.4–12.3)
POTASSIUM SERPL-SCNC: 4.3 MMOL/L (ref 3.5–5.1)
PROT SERPL-MCNC: 6.8 G/DL (ref 6.3–8.2)
RBC # BLD AUTO: 4.87 M/UL (ref 4.05–5.2)
SODIUM SERPL-SCNC: 140 MMOL/L (ref 136–145)
WBC # BLD AUTO: 5.9 K/UL (ref 4.3–11.1)

## 2025-06-05 ENCOUNTER — OFFICE VISIT (OUTPATIENT)
Dept: ONCOLOGY | Age: 70
End: 2025-06-05
Payer: MEDICARE

## 2025-06-05 VITALS
RESPIRATION RATE: 22 BRPM | OXYGEN SATURATION: 95 % | HEART RATE: 74 BPM | BODY MASS INDEX: 32.06 KG/M2 | SYSTOLIC BLOOD PRESSURE: 131 MMHG | WEIGHT: 174.2 LBS | DIASTOLIC BLOOD PRESSURE: 70 MMHG | HEIGHT: 62 IN | TEMPERATURE: 98.1 F

## 2025-06-05 DIAGNOSIS — Z17.0 MALIGNANT NEOPLASM OF OVERLAPPING SITES OF RIGHT BREAST IN FEMALE, ESTROGEN RECEPTOR POSITIVE (HCC): Primary | ICD-10-CM

## 2025-06-05 DIAGNOSIS — R23.2 HOT FLASHES RELATED TO AROMATASE INHIBITOR THERAPY: ICD-10-CM

## 2025-06-05 DIAGNOSIS — T45.1X5A HOT FLASHES RELATED TO AROMATASE INHIBITOR THERAPY: ICD-10-CM

## 2025-06-05 DIAGNOSIS — Z79.811 AROMATASE INHIBITOR USE: ICD-10-CM

## 2025-06-05 DIAGNOSIS — C50.811 MALIGNANT NEOPLASM OF OVERLAPPING SITES OF RIGHT BREAST IN FEMALE, ESTROGEN RECEPTOR POSITIVE (HCC): Primary | ICD-10-CM

## 2025-06-05 PROCEDURE — 1159F MED LIST DOCD IN RCRD: CPT | Performed by: NURSE PRACTITIONER

## 2025-06-05 PROCEDURE — 3075F SYST BP GE 130 - 139MM HG: CPT | Performed by: NURSE PRACTITIONER

## 2025-06-05 PROCEDURE — 99214 OFFICE O/P EST MOD 30 MIN: CPT | Performed by: NURSE PRACTITIONER

## 2025-06-05 PROCEDURE — 1090F PRES/ABSN URINE INCON ASSESS: CPT | Performed by: NURSE PRACTITIONER

## 2025-06-05 PROCEDURE — 3078F DIAST BP <80 MM HG: CPT | Performed by: NURSE PRACTITIONER

## 2025-06-05 PROCEDURE — G8417 CALC BMI ABV UP PARAM F/U: HCPCS | Performed by: NURSE PRACTITIONER

## 2025-06-05 PROCEDURE — 1126F AMNT PAIN NOTED NONE PRSNT: CPT | Performed by: NURSE PRACTITIONER

## 2025-06-05 PROCEDURE — 3017F COLORECTAL CA SCREEN DOC REV: CPT | Performed by: NURSE PRACTITIONER

## 2025-06-05 PROCEDURE — 1036F TOBACCO NON-USER: CPT | Performed by: NURSE PRACTITIONER

## 2025-06-05 PROCEDURE — G8399 PT W/DXA RESULTS DOCUMENT: HCPCS | Performed by: NURSE PRACTITIONER

## 2025-06-05 PROCEDURE — 1123F ACP DISCUSS/DSCN MKR DOCD: CPT | Performed by: NURSE PRACTITIONER

## 2025-06-05 PROCEDURE — 1160F RVW MEDS BY RX/DR IN RCRD: CPT | Performed by: NURSE PRACTITIONER

## 2025-06-05 PROCEDURE — G8427 DOCREV CUR MEDS BY ELIG CLIN: HCPCS | Performed by: NURSE PRACTITIONER

## 2025-06-05 NOTE — PROGRESS NOTES
Laterality Date    BREAST BIOPSY Right 12/20/2022    RIGHT SENTINEL NODE BIOPSY  performed by Shahid Lemus Jr., MD at [facility] MAIN OR    MASTECTOMY Right 12/20/2022    RIGHT BREAST MASTECTOMY performed by Shahid Lemus Jr., MD at [facility] MAIN OR    OTHER SURGICAL HISTORY      right lymph node excision - neck    US BREAST BIOPSY W LOC DEVICE 1ST LESION RIGHT Right 10/05/2022    US BREAST NEEDLE BIOPSY RIGHT 10/5/2022 Kareem Sierra MD [facility] RADIOLOGY MAMMO     Family History   Problem Relation Age of Onset    Thyroid Disease Mother     Heart Disease Mother     High Blood Pressure Father     Heart Disease Father     Thyroid Disease Father     Cancer Sister 50        lung    Diabetes Neg Hx      Social History     Socioeconomic History    Marital status:      Spouse name: Not on file    Number of children: Not on file    Years of education: Not on file    Highest education level: Not on file   Occupational History    Not on file   Tobacco Use    Smoking status: Never    Smokeless tobacco: Never   Vaping Use    Vaping status: Never Used   Substance and Sexual Activity    Alcohol use: Not Currently     Comment: Socially    Drug use: Never    Sexual activity: Not on file   Other Topics Concern    Not on file   Social History Narrative    Not on file     Social Drivers of Health     Financial Resource Strain: Low Risk  (8/7/2024)    Overall Financial Resource Strain (CARDIA)     Difficulty of Paying Living Expenses: Not hard at all   Food Insecurity: No Food Insecurity (2/12/2025)    Hunger Vital Sign     Worried About Running Out of Food in the Last Year: Never true     Ran Out of Food in the Last Year: Never true   Transportation Needs: No Transportation Needs (2/12/2025)    PRAPARE - Transportation     Lack of Transportation (Medical): No     Lack of Transportation (Non-Medical): No   Physical Activity: Insufficiently Active (8/7/2024)    Exercise Vital Sign     Days of Exercise per Week: 4 days     Minutes of

## 2025-06-05 NOTE — PATIENT INSTRUCTIONS
No visits with results within 1 Day(s) from this visit.   Latest known visit with results is:   Orders Only on 05/23/2025   Component Date Value Ref Range Status    Sodium 05/23/2025 140  136 - 145 mmol/L Final    Potassium 05/23/2025 4.3  3.5 - 5.1 mmol/L Final    Chloride 05/23/2025 105  98 - 107 mmol/L Final    CO2 05/23/2025 26  20 - 29 mmol/L Final    Anion Gap 05/23/2025 9  7 - 16 mmol/L Final    Glucose 05/23/2025 84  70 - 99 mg/dL Final    Comment: <70 mg/dL Consistent with, but not fully diagnostic of hypoglycemia.  100 - 125 mg/dL Impaired fasting glucose/consistent with pre-diabetes mellitus.  > 126 mg/dl Fasting glucose consistent with overt diabetes mellitus      BUN 05/23/2025 12  8 - 23 MG/DL Final    Creatinine 05/23/2025 0.90  0.60 - 1.10 MG/DL Final    Est, Glom Filt Rate 05/23/2025 69  >60 ml/min/1.73m2 Final    Comment:   Pediatric calculator link: https://www.kidney.org/professionals/kdoqi/gfr_calculatorped    These results are not intended for use in patients <18 years of age.    eGFR results are calculated without a race factor using  the 2021 CKD-EPI equation. Careful clinical correlation is recommended, particularly when comparing to results calculated using previous equations.  The CKD-EPI equation is less accurate in patients with extremes of muscle mass, extra-renal metabolism of creatinine, excessive creatine ingestion, or following therapy that affects renal tubular secretion.      Calcium 05/23/2025 9.7  8.8 - 10.2 MG/DL Final    Total Bilirubin 05/23/2025 0.4  0.0 - 1.2 MG/DL Final    ALT 05/23/2025 33  8 - 45 U/L Final    AST 05/23/2025 31  15 - 37 U/L Final    Alk Phosphatase 05/23/2025 71  35 - 104 U/L Final    Total Protein 05/23/2025 6.8  6.3 - 8.2 g/dL Final    Albumin 05/23/2025 3.8  3.2 - 4.6 g/dL Final    Globulin 05/23/2025 3.0  2.3 - 3.5 g/dL Final    Albumin/Globulin Ratio 05/23/2025 1.3  1.0 - 1.9   Final    WBC 05/23/2025 5.9  4.3 - 11.1 K/uL Final    RBC 05/23/2025 4.87

## 2025-06-16 DIAGNOSIS — Z17.0 MALIGNANT NEOPLASM OF OVERLAPPING SITES OF RIGHT BREAST IN FEMALE, ESTROGEN RECEPTOR POSITIVE (HCC): ICD-10-CM

## 2025-06-16 DIAGNOSIS — C50.811 MALIGNANT NEOPLASM OF OVERLAPPING SITES OF RIGHT BREAST IN FEMALE, ESTROGEN RECEPTOR POSITIVE (HCC): ICD-10-CM

## 2025-06-16 DIAGNOSIS — Z79.811 AROMATASE INHIBITOR USE: ICD-10-CM

## 2025-06-16 RX ORDER — ANASTROZOLE 1 MG/1
1 TABLET ORAL DAILY
Qty: 90 TABLET | Refills: 3 | Status: SHIPPED | OUTPATIENT
Start: 2025-06-16

## 2025-08-20 ENCOUNTER — OFFICE VISIT (OUTPATIENT)
Dept: FAMILY MEDICINE CLINIC | Facility: CLINIC | Age: 70
End: 2025-08-20

## 2025-08-20 VITALS
SYSTOLIC BLOOD PRESSURE: 135 MMHG | HEIGHT: 62 IN | BODY MASS INDEX: 32.61 KG/M2 | RESPIRATION RATE: 17 BRPM | HEART RATE: 83 BPM | OXYGEN SATURATION: 96 % | WEIGHT: 177.2 LBS | TEMPERATURE: 97.5 F | DIASTOLIC BLOOD PRESSURE: 77 MMHG

## 2025-08-20 DIAGNOSIS — R73.09 ABNORMAL GLUCOSE: ICD-10-CM

## 2025-08-20 DIAGNOSIS — I10 PRIMARY HYPERTENSION: ICD-10-CM

## 2025-08-20 DIAGNOSIS — C50.919 MALIGNANT NEOPLASM OF FEMALE BREAST, UNSPECIFIED ESTROGEN RECEPTOR STATUS, UNSPECIFIED LATERALITY, UNSPECIFIED SITE OF BREAST (HCC): ICD-10-CM

## 2025-08-20 DIAGNOSIS — Z00.00 MEDICARE ANNUAL WELLNESS VISIT, SUBSEQUENT: Primary | ICD-10-CM

## 2025-08-20 DIAGNOSIS — R63.5 WEIGHT GAIN: ICD-10-CM

## 2025-08-20 DIAGNOSIS — E78.5 DYSLIPIDEMIA: ICD-10-CM

## 2025-08-20 DIAGNOSIS — E55.9 VITAMIN D DEFICIENCY: ICD-10-CM

## 2025-08-20 DIAGNOSIS — R79.89 ELEVATED LIVER FUNCTION TESTS: ICD-10-CM

## 2025-08-20 LAB
25(OH)D3 SERPL-MCNC: 40.5 NG/ML (ref 30–100)
CHOLEST SERPL-MCNC: 140 MG/DL (ref 0–200)
CREAT UR-MCNC: 121 MG/DL (ref 28–217)
EST. AVERAGE GLUCOSE BLD GHB EST-MCNC: 114 MG/DL
HBA1C MFR BLD: 5.6 % (ref 0–5.6)
HDLC SERPL-MCNC: 46 MG/DL (ref 40–60)
HDLC SERPL: 3.1 (ref 0–5)
LDLC SERPL CALC-MCNC: 47 MG/DL (ref 0–100)
MICROALBUMIN UR-MCNC: <1.2 MG/DL (ref 0–20)
MICROALBUMIN/CREAT UR-RTO: NORMAL MG/G (ref 0–30)
TRIGL SERPL-MCNC: 235 MG/DL (ref 0–150)
TSH, 3RD GENERATION: 1.33 UIU/ML (ref 0.27–4.2)
VLDLC SERPL CALC-MCNC: 47 MG/DL (ref 6–23)

## 2025-08-20 RX ORDER — ROSUVASTATIN CALCIUM 20 MG/1
20 TABLET, COATED ORAL NIGHTLY
Qty: 90 TABLET | Refills: 2 | Status: SHIPPED | OUTPATIENT
Start: 2025-08-20

## 2025-08-20 RX ORDER — LISINOPRIL 10 MG/1
10 TABLET ORAL DAILY
Qty: 90 TABLET | Refills: 2 | Status: SHIPPED | OUTPATIENT
Start: 2025-08-20

## 2025-08-20 ASSESSMENT — PATIENT HEALTH QUESTIONNAIRE - PHQ9
7. TROUBLE CONCENTRATING ON THINGS, SUCH AS READING THE NEWSPAPER OR WATCHING TELEVISION: NOT AT ALL
10. IF YOU CHECKED OFF ANY PROBLEMS, HOW DIFFICULT HAVE THESE PROBLEMS MADE IT FOR YOU TO DO YOUR WORK, TAKE CARE OF THINGS AT HOME, OR GET ALONG WITH OTHER PEOPLE: NOT DIFFICULT AT ALL
6. FEELING BAD ABOUT YOURSELF - OR THAT YOU ARE A FAILURE OR HAVE LET YOURSELF OR YOUR FAMILY DOWN: NOT AT ALL
SUM OF ALL RESPONSES TO PHQ QUESTIONS 1-9: 1
3. TROUBLE FALLING OR STAYING ASLEEP: SEVERAL DAYS
SUM OF ALL RESPONSES TO PHQ QUESTIONS 1-9: 1
8. MOVING OR SPEAKING SO SLOWLY THAT OTHER PEOPLE COULD HAVE NOTICED. OR THE OPPOSITE, BEING SO FIGETY OR RESTLESS THAT YOU HAVE BEEN MOVING AROUND A LOT MORE THAN USUAL: NOT AT ALL
5. POOR APPETITE OR OVEREATING: NOT AT ALL
2. FEELING DOWN, DEPRESSED OR HOPELESS: NOT AT ALL
9. THOUGHTS THAT YOU WOULD BE BETTER OFF DEAD, OR OF HURTING YOURSELF: NOT AT ALL
4. FEELING TIRED OR HAVING LITTLE ENERGY: NOT AT ALL
1. LITTLE INTEREST OR PLEASURE IN DOING THINGS: NOT AT ALL

## 2025-08-20 ASSESSMENT — LIFESTYLE VARIABLES: HOW OFTEN DO YOU HAVE A DRINK CONTAINING ALCOHOL: NEVER

## (undated) DEVICE — RESERVOIR,SUCTION,100CC,SILICONE: Brand: MEDLINE

## (undated) DEVICE — SUTURE STRATAFIX SZ 3-0 L30CM NONABSORBABLE UD L19MM FS-2 SXMP2B408

## (undated) DEVICE — SUTURE PERMAHAND SZ 2-0 L18IN NONABSORBABLE BLK L26MM PS 1588H

## (undated) DEVICE — PROBE COVER CIV-FLEX 4.6X61CM ULTRASOUND

## (undated) DEVICE — ELECTRODE PT RET AD L9FT HI MOIST COND ADH HYDRGEL CORDED

## (undated) DEVICE — UNIVERSAL DRAPES: Brand: MEDLINE INDUSTRIES, INC.

## (undated) DEVICE — SUTURE VCRL SZ 3-0 L27IN ABSRB UD L26MM SH 1/2 CIR J416H

## (undated) DEVICE — PAD,ABDOMINAL,5"X9",ST,LF,25/BX: Brand: MEDLINE INDUSTRIES, INC.

## (undated) DEVICE — ADHESIVE SKIN CLSR 0.7ML TOP DERMBND ADV

## (undated) DEVICE — BLADE ES ELASTOMERIC COAT INSUL DURABLE BEND UPTO 90DEG

## (undated) DEVICE — SUTURE NONABSORBABLE MONOFILAMENT 3-0 PS-1 18 IN BLK ETHILON 1663H

## (undated) DEVICE — DRAIN SURG 19FR 0.25IN SIL RND W/ TRCR INDIC DOT RADPQ FULL

## (undated) DEVICE — GARMENT,MEDLINE,DVT,INT,CALF,MED, GEN2: Brand: MEDLINE

## (undated) DEVICE — Device

## (undated) DEVICE — NEEDLE HYPO 25GA L5/8IN ORNG HUB S STL LATCH BVL UP

## (undated) DEVICE — CONTAINER,SPECIMEN,OR STERILE,4OZ: Brand: MEDLINE

## (undated) DEVICE — SUTURE MCRYL SZ 4-0 L18IN ABSRB UD L19MM PS-2 3/8 CIR PRIM Y496G

## (undated) DEVICE — GLOVE SURG SZ 75 L12IN FNGR THK79MIL GRN LTX FREE

## (undated) DEVICE — SPONGE LAPAROTOMY W18XL18IN WHITE STRUNG RADIOPAQUE STERILE

## (undated) DEVICE — MINOR SPLIT GENERAL: Brand: MEDLINE INDUSTRIES, INC.